# Patient Record
Sex: MALE | Race: WHITE | Employment: FULL TIME | URBAN - METROPOLITAN AREA
[De-identification: names, ages, dates, MRNs, and addresses within clinical notes are randomized per-mention and may not be internally consistent; named-entity substitution may affect disease eponyms.]

---

## 2017-01-11 ENCOUNTER — OFFICE VISIT (OUTPATIENT)
Dept: FAMILY MEDICINE | Facility: CLINIC | Age: 27
End: 2017-01-11

## 2017-01-11 VITALS
DIASTOLIC BLOOD PRESSURE: 84 MMHG | TEMPERATURE: 98.5 F | WEIGHT: 196 LBS | HEART RATE: 61 BPM | SYSTOLIC BLOOD PRESSURE: 124 MMHG | OXYGEN SATURATION: 99 %

## 2017-01-11 DIAGNOSIS — R68.89 FLU-LIKE SYMPTOMS: Primary | ICD-10-CM

## 2017-01-11 DIAGNOSIS — F41.9 ANXIETY: ICD-10-CM

## 2017-01-11 ASSESSMENT — PAIN SCALES - GENERAL: PAINLEVEL: MILD PAIN (2)

## 2017-01-11 NOTE — MR AVS SNAPSHOT
After Visit Summary   1/11/2017    Hal Woo    MRN: [id number]           Patient Information     Date Of Birth          1990        Visit Information        Provider Department      1/11/2017 2:40 PM Brittany Penaloza MD [facility]        Care Instructions    EMDR for anxiety        Follow-ups after your visit        Your next 10 appointments already scheduled     Jan 31, 2017  9:45 AM   (Arrive by 9:30 AM)   New Patient Visit with Ry Loja MD   [facility] ([facility] [place])    [address] [place] [id number] [phone]              Who to contact     Please call your clinic at [phone] to:    Ask questions about your health    Make or cancel appointments    Discuss your medicines    Learn about your test results    Speak to your doctor   If you have compliments or concerns about an experience at your clinic, or if you wish to file a complaint, please contact [facility] Patient Relations at [phone] or email us at [email or web address]         Additional Information About Your Visit        MyChart Information     iPowerUpt gives you secure access to your electronic health record. If you see a primary care provider, you can also send messages to your care team and make appointments. If you have questions, please call your primary care clinic.  If you do not have a primary care provider, please call [phone] and they will assist you.      Beststudy is an electronic gateway that provides easy, online access to your medical records. With Beststudy, you can request a clinic appointment, read your test results, renew a prescription or communicate with your care team.     To access your existing account, please contact your [facility] or call [phone] for assistance.        Care EveryWhere ID     This is your Care EveryWhere ID. This could  be used by other organizations to access your [place] medical records  [id number]        Your Vitals Were     Pulse Temperature Pulse Oximetry             61 98.5  F (36.9  C) (Tympanic) 99%          Blood Pressure from Last 3 Encounters:   01/11/17 124/84    Weight from Last 3 Encounters:   01/11/17 196 lb (88.905 kg)              Today, you had the following     No orders found for display       Primary Care Provider    None       No address on file        Thank you!     Thank you for choosing [facility]  for your care. Our goal is always to provide you with excellent care. Hearing back from our patients is one way we can continue to improve our services. Please take a few minutes to complete the written survey that you may receive in the mail after your visit with us. Thank you!             Your Updated Medication List - Protect others around you: Learn how to safely use, store and throw away your medicines at www.disposemymeds.org.          This list is accurate as of: 1/11/17  3:38 PM.  Always use your most recent med list.                   Brand Name Dispense Instructions for use    MULTIVITAMIN PO      Take by mouth daily       VITAMIN D PO      Take by mouth daily

## 2017-01-11 NOTE — PROGRESS NOTES
Hal Woo is a 26 year old male here for the following issues:    Flu like symptoms  Hal is a new patient to our clinic. He is a generally healthy man but became ill about 5 days ago with sore throat, fatigue, fever, stuffed up head and chest congestion and tightness.  Nonsmoker.  Took Dayquil and Nyquil.     He has missed work this week. He works as a  at the [employer]    He got his Flu vaccine in October 2016.    Anxiety  He has been in therapy for the past 2 yrs. He reports he can't use SSRI, because it worsened his symptoms. He had been on Remeron, 15mg (June till November), but gained weight. . He is exercising, playing [employer] several times a week. His therapist uses CBT and EMDR. He reports drinking 3-4x per week.     [employer]asa res[employer] MBA started fall 2016, 2 yr   at [employer]      There is no problem list on file for this patient.      No current outpatient prescriptions on file.       Allergies not on file     EXAM  /84 mmHg  Pulse 61  Temp(Src) 98.5  F (36.9  C) (Tympanic)  Wt 196 lb (88.905 kg)  SpO2 99%  Gen: Alert, pleasant, NAD  HEENT:  Conjunctiva nl, TM normal bilaterally, OP clear, minor posterior erythema  COR: S1,S2, no murmur  Lungs: CTA bilaterally, no rhonchi, wheezes or rales  Psych: casually groomed, normal speech    Assessment:  (R68.89) Flu-like symptoms  (primary encounter diagnosis)  Comment: suspect viral illness.   Plan: too late to treat for influenza type illness. Recommend symptomatic cares, plenty of fluids, rest, analgesics.    (F41.9) Anxiety  Comment: ongoing treatment with [facility].   Plan: he declines rx for SSRI. He may call if he changes his mind.     Brittany Penaloza MD  Internal Medicine/Pediatrics

## 2017-01-11 NOTE — NURSING NOTE
Chief Complaint   Patient presents with     Sinus Problem     26 year old      Blood pressure 124/84, pulse 61, temperature 98.5  F (36.9  C), temperature source Tympanic, weight 196 lb (88.905 kg), SpO2 99 %. There is no height on file to calculate BMI.    Wt Readings from Last 2 Encounters:   01/11/17 196 lb (88.905 kg)     BP Readings from Last 3 Encounters:   01/11/17 124/84       There is no problem list on file for this patient.      Current Outpatient Prescriptions   Medication Sig Dispense Refill     Multiple Vitamins-Minerals (MULTIVITAMIN PO) Take by mouth daily       Cholecalciferol (VITAMIN D PO) Take by mouth daily         Social History   Substance Use Topics     Smoking status: Never Smoker      Smokeless tobacco: Never Used     Alcohol Use: Not on file         Health Maintenance Due   Topic Date Due     TETANUS IMMUNIZATION (SYSTEM ASSIGNED)  04/03/2007     INFLUENZA VACCINE (SYSTEM ASSIGNED)  09/01/2016       JACINTO TORRES CMA  January 11, 2017 2:58 PM

## 2017-01-18 ENCOUNTER — OFFICE VISIT (OUTPATIENT)
Dept: FAMILY MEDICINE | Facility: CLINIC | Age: 27
End: 2017-01-18

## 2017-01-18 VITALS
OXYGEN SATURATION: 98 % | WEIGHT: 195 LBS | SYSTOLIC BLOOD PRESSURE: 121 MMHG | DIASTOLIC BLOOD PRESSURE: 79 MMHG | TEMPERATURE: 98 F | HEART RATE: 60 BPM

## 2017-01-18 DIAGNOSIS — R07.0 THROAT PAIN: Primary | ICD-10-CM

## 2017-01-18 LAB
BASOPHILS # BLD AUTO: 0 10E9/L (ref 0–0.2)
BASOPHILS NFR BLD AUTO: 0.3 %
DIFFERENTIAL METHOD BLD: NORMAL
EOSINOPHIL # BLD AUTO: 0.2 10E9/L (ref 0–0.7)
EOSINOPHIL NFR BLD AUTO: 3.1 %
ERYTHROCYTE [DISTWIDTH] IN BLOOD BY AUTOMATED COUNT: 12.9 % (ref 10–15)
FLUAV AG UPPER RESP QL IA.RAPID: NEGATIVE
FLUBV AG UPPER RESP QL IA.RAPID: NEGATIVE
HCT VFR BLD AUTO: 43.5 % (ref 40–53)
HGB BLD-MCNC: 15.5 G/DL (ref 13.3–17.7)
IMM GRANULOCYTES # BLD: 0 10E9/L (ref 0–0.4)
IMM GRANULOCYTES NFR BLD: 0.3 %
LYMPHOCYTES # BLD AUTO: 1.6 10E9/L (ref 0.8–5.3)
LYMPHOCYTES NFR BLD AUTO: 21.8 %
MCH RBC QN AUTO: 30.6 PG (ref 26.5–33)
MCHC RBC AUTO-ENTMCNC: 35.6 G/DL (ref 31.5–36.5)
MCV RBC AUTO: 86 FL (ref 78–100)
MONOCYTES # BLD AUTO: 0.7 10E9/L (ref 0–1.3)
MONOCYTES NFR BLD AUTO: 8.7 %
MONONUCLEOSIS SCREEN: NEGATIVE
NEUTROPHILS # BLD AUTO: 4.9 10E9/L (ref 1.6–8.3)
NEUTROPHILS NFR BLD AUTO: 65.8 %
NRBC # BLD AUTO: 0 10*3/UL
NRBC BLD AUTO-RTO: 0 /100
PLATELET # BLD AUTO: 191 10E9/L (ref 150–450)
RBC # BLD AUTO: 5.06 10E12/L (ref 4.4–5.9)
S PYO AG THROAT QL IA.RAPID: NEGATIVE
WBC # BLD AUTO: 7.4 10E9/L (ref 4–11)

## 2017-01-18 ASSESSMENT — PAIN SCALES - GENERAL: PAINLEVEL: NO PAIN (0)

## 2017-01-18 NOTE — NURSING NOTE
Chief Complaint   Patient presents with     Pharyngitis     26 year old      Blood pressure 121/79, pulse 60, temperature 98  F (36.7  C), temperature source Oral, weight 195 lb (88.451 kg), SpO2 98 %. There is no height on file to calculate BMI.    Wt Readings from Last 2 Encounters:   01/18/17 195 lb (88.451 kg)   01/11/17 196 lb (88.905 kg)     BP Readings from Last 3 Encounters:   01/18/17 121/79   01/11/17 124/84       There is no problem list on file for this patient.      Current Outpatient Prescriptions   Medication Sig Dispense Refill     Multiple Vitamins-Minerals (MULTIVITAMIN PO) Take by mouth daily       Cholecalciferol (VITAMIN D PO) Take by mouth daily         Social History   Substance Use Topics     Smoking status: Never Smoker      Smokeless tobacco: Never Used     Alcohol Use: 0.0 oz/week     0 Standard drinks or equivalent per week         There are no preventive care reminders to display for this patient.    JACINTO TORRES CMA  January 18, 2017 10:44 AM

## 2017-01-18 NOTE — MR AVS SNAPSHOT
After Visit Summary   1/18/2017    Hal Woo    MRN: [id number]           Patient Information     Date Of Birth          1990        Visit Information        Provider Department      1/18/2017 10:40 AM Brittany Penaloza MD [facility]        Today's Diagnoses     Throat pain    -  1        Follow-ups after your visit        Your next 10 appointments already scheduled     Jan 31, 2017  9:45 AM   (Arrive by 9:30 AM)   New Patient Visit with Ry Loja MD   [facility] ([facility] [place])    [address] [place] [id number] [phone]              Who to contact     Please call your clinic at [phone] to:    Ask questions about your health    Make or cancel appointments    Discuss your medicines    Learn about your test results    Speak to your doctor   If you have compliments or concerns about an experience at your clinic, or if you wish to file a complaint, please contact [facility] Patient Relations at [phone] or email us at [email or web address]         Additional Information About Your Visit        MyChart Information     Ybraint gives you secure access to your electronic health record. If you see a primary care provider, you can also send messages to your care team and make appointments. If you have questions, please call your primary care clinic.  If you do not have a primary care provider, please call [phone] and they will assist you.      Koinify is an electronic gateway that provides easy, online access to your medical records. With Koinify, you can request a clinic appointment, read your test results, renew a prescription or communicate with your care team.     To access your existing account, please contact your [facility] or call [phone] for assistance.        Care EveryWhere ID     This is your Care EveryWhere ID. This  could be used by other organizations to access your [place] medical records  [id number]        Your Vitals Were     Pulse Temperature Pulse Oximetry             60 98  F (36.7  C) (Oral) 98%          Blood Pressure from Last 3 Encounters:   01/18/17 121/79   01/11/17 124/84    Weight from Last 3 Encounters:   01/18/17 195 lb (88.451 kg)   01/11/17 196 lb (88.905 kg)              We Performed the Following     CBC with platelets differential     Influenza A/B Antigen ([place])     Mononucleosis Screen ([facility])     Rapid Strep Screen (Group) ([facility])     Throat Culture Aerobic Bacterial        Primary Care Provider    None       No address on file        Thank you!     Thank you for choosing [facility]  for your care. Our goal is always to provide you with excellent care. Hearing back from our patients is one way we can continue to improve our services. Please take a few minutes to complete the written survey that you may receive in the mail after your visit with us. Thank you!             Your Updated Medication List - Protect others around you: Learn how to safely use, store and throw away your medicines at www.disposemymeds.org.          This list is accurate as of: 1/18/17 12:26 PM.  Always use your most recent med list.                   Brand Name Dispense Instructions for use    MULTIVITAMIN PO      Take by mouth daily       VITAMIN D PO      Take by mouth daily

## 2017-01-18 NOTE — PROGRESS NOTES
"Hal Woo is a 26 year old male here for the following issues:    Throat pain   Hal was seen 1/11/17 with flu like symptoms. He is generally well but had been ill the 5 days prior with sore throat, fatigue, stuffed up head and a cough. I suspected flu like illness but did not do any testing at that time. Recommended he take time off work and gave symptomatic cares. He played in a [employer] tournament last weekend and felt winded and tired afterward. Two days ago he reports he had recovered and felt 100%. Then he went for a run and felt \"horrible\" afterward.     Yestderday, he had abrupt onset again of sore throat, achy muscles, fatigue and some congestion and headache. Cough is better.  No fever. No nausea or diarrhea     There is no problem list on file for this patient.      Current Outpatient Prescriptions   Medication Sig Dispense Refill     Multiple Vitamins-Minerals (MULTIVITAMIN PO) Take by mouth daily       Cholecalciferol (VITAMIN D PO) Take by mouth daily         Allergies   Allergen Reactions     Seasonal Allergies         EXAM  /79 mmHg  Pulse 60  Temp(Src) 98  F (36.7  C) (Oral)  Wt 195 lb (88.451 kg)  SpO2 98%  Gen: Alert, pleasant, NAD  HEENT:  Conjunctiva nl, TM normal bilaterally, OP clear, mild posterior erythema  Neck with shoddy LAD  COR: S1,S2, no murmur  Lungs: CTA bilaterally, no rhonchi, wheezes or rales    Results for orders placed or performed in visit on 01/18/17   Rapid Strep Screen (Group) ([place])   Result Value Ref Range    Rapid Strep A Screen NEGATIVE Negative   Influenza A/B Antigen ([facility])   Result Value Ref Range    Influenza A NEGATIVE Negative    Influenza B NEGATIVE Negative     Strep culture pending.    Assessment:  (R07.0) Throat pain  (primary encounter diagnosis)  Comment: suspect viral illness  Plan: Rapid Strep Screen (Group) ([facility]), Throat         Culture Aerobic Bacterial, Influenza A/B         Antigen ([facility]), CBC with platelets    "      differential, Mononucleosis Screen ([place])      Recommend rest, fluids, analgesics. Contact MD for worsening symptoms.       Brittany Penaloza MD  Internal Medicine/Pediatrics

## 2017-01-20 LAB
BACTERIA SPEC CULT: NORMAL
MICRO REPORT STATUS: NORMAL
SPECIMEN SOURCE: NORMAL

## 2017-01-31 ENCOUNTER — OFFICE VISIT (OUTPATIENT)
Dept: DERMATOLOGY | Facility: CLINIC | Age: 27
End: 2017-01-31

## 2017-01-31 DIAGNOSIS — L20.89 OTHER ATOPIC DERMATITIS: Primary | ICD-10-CM

## 2017-01-31 RX ORDER — CETIRIZINE HYDROCHLORIDE 10 MG/1
10 TABLET ORAL
COMMUNITY
Start: 2015-10-28 | End: 2021-04-22

## 2017-01-31 RX ORDER — ALPRAZOLAM 0.5 MG
0.5 TABLET ORAL
COMMUNITY
Start: 2015-02-02 | End: 2017-03-03 | Stop reason: ALTCHOICE

## 2017-01-31 RX ORDER — TRIAMCINOLONE ACETONIDE 1 MG/G
OINTMENT TOPICAL 2 TIMES DAILY
Qty: 80 G | Refills: 3 | Status: SHIPPED | OUTPATIENT
Start: 2017-01-31 | End: 2021-12-06

## 2017-01-31 ASSESSMENT — PAIN SCALES - GENERAL: PAINLEVEL: NO PAIN (0)

## 2017-01-31 NOTE — PATIENT INSTRUCTIONS
Cerave Itch Relief Moisturizing Cream TWICE DAILY  Cera Ve Healing Ointment (coms mag blue tube) as a barrier during activity  Triamcinolone - to other sites and during flares TWICE PER DAY until resolved  Desonide - to face, underarms, groin TWICE PER DAY as maintenance

## 2017-01-31 NOTE — PROGRESS NOTES
"[facility] Dermatology Note      Dermatology Problem List:  1. Eczema    Encounter Date: Jan 31, 2017    CC:  Chief Complaint   Patient presents with     Derm Problem     Subhash is here today for a Rash that is all over his body. Subhash notes\" I've had the rash for 1-2 years but it has been getting worse\"         History of Present Illness:  Mr. Hal Woo is a 26 year old male who presents in self referral for evaluation of his eczema. He stated that he has had a red, itchy rash on his underarms, lower back and right upper thigh for ~5 years. He endorsed hot showers and exercise as exacerbating factors and cold showers and CeraVe moisturizer as alleviating factors. He has been evaluated by several primary care doctors an dermatologists and was prescribed desonide cream which he stated provides some relief despite his using it once per day or less.     Past Medical History:   There is no problem list on file for this patient.    History reviewed. No pertinent past medical history.  Past Surgical History   Procedure Laterality Date     Mandible surgery       Hc removal adenoids,primary,<13 y/o       Nose surgery       deviated septum     Hernia repair       infancy       Social History:  The patient is a musician. 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    Family History:  There is no family history of skin cancer.    Medications:  Current Outpatient Prescriptions   Medication Sig Dispense Refill     ALPRAZolam (XANAX) 0.5 MG tablet Take 0.5 mg by mouth       cetirizine (ZYRTEC) 10 MG tablet Take 10 mg by mouth       DHA-EPA-VITAMIN E PO Take 5 mLs by mouth       triamcinolone (KENALOG) 0.1 % ointment Apply topically 2 times daily Avoid face, underarms, groin 80 g 3     Multiple Vitamins-Minerals (MULTIVITAMIN PO) Take by mouth daily       Cholecalciferol (VITAMIN D PO) Take by mouth daily       Allergies   Allergen Reactions     Pineapple Difficulty breathing, Hives, Itching, Rash and " Shortness Of Breath     Seasonal Allergies          Review of Systems:  -As per HPI  -Constitutional: The patient denies fatigue, fevers, chills, unintended weight loss, and night sweats.  -HEENT: Patient denies nonhealing oral sores.  -Skin: As above in HPI. No additional skin concerns.    Physical exam:  Vitals: There were no vitals taken for this visit.  GEN: This is a well developed, well-nourished male in no acute distress, in a pleasant mood.    SKIN: Waist-up skin, which includes the head/face, neck, both arms, chest, back, abdomen, digits and/or nails was examined.  -well circumscribed erythematous plaques and fine papules with moderate lichenification over bilateral axilla, lower back and right inner thigh  -No other lesions of concern on areas examined.     Impression/Plan:  1. Eczematous dermatitis    CeraVe anti-itch moisturizer BID and PRN    Continue Desonide (prescribed by outside doctor)      Triamcinolone 0.1% for flares (BID until resolved)      Follow-up in 3 months, earlier for new or changing lesions.     Staff Involved:  Scribed by Myrtle Kuo MS4 for Dr. Loja.      Staff attestation:  The documentation recorded by the scribe accurately reflects the services I personally performed and the decisions I personally made.    Ry Loja MD  Staff Dermatologist    Department of Dermatology

## 2017-01-31 NOTE — Clinical Note
"1/31/2017       RE: Hal Woo  [address]  [place] [address]     Dear Colleague,    Thank you for referring your patient, Hal Woo, to the [facility] DERMATOLOGY at [facility]. Please see a copy of my visit note below.    [facility] Dermatology Note      Dermatology Problem List:  1. Eczema    Encounter Date: Jan 31, 2017    CC:  Chief Complaint   Patient presents with     Derm Problem     Subhash is here today for a Rash that is all over his body. Subhash notes\" I've had the rash for 1-2 years but it has been getting worse\"         History of Present Illness:  Mr. Hal Woo is a 26 year old male who presents in self referral for evaluation of his eczema. He stated that he has had a red, itchy rash on his underarms, lower back and right upper thigh for ~5 years. He endorsed hot showers and exercise as exacerbating factors and cold showers and CeraVe moisturizer as alleviating factors. He has been evaluated by several primary care doctors an dermatologists and was prescribed desonide cream which he stated provides some relief despite his using it once per day or less.     Past Medical History:   There is no problem list on file for this patient.    History reviewed. No pertinent past medical history.  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    Family History:  There is no family history of skin cancer.    Medications:  Current Outpatient Prescriptions   Medication Sig Dispense Refill     ALPRAZolam (XANAX) 0.5 MG tablet Take 0.5 mg by mouth       cetirizine (ZYRTEC) 10 MG tablet Take 10 mg by mouth       DHA-EPA-VITAMIN E PO Take 5 mLs by mouth       triamcinolone (KENALOG) 0.1 % ointment Apply " topically 2 times daily Avoid face, underarms, groin 80 g 3     Multiple Vitamins-Minerals (MULTIVITAMIN PO) Take by mouth daily       Cholecalciferol (VITAMIN D PO) Take by mouth daily       Allergies   Allergen Reactions     Pineapple Difficulty breathing, Hives, Itching, Rash and Shortness Of Breath     Seasonal Allergies          Review of Systems:  -As per HPI  -Constitutional: The patient denies fatigue, fevers, chills, unintended weight loss, and night sweats.  -HEENT: Patient denies nonhealing oral sores.  -Skin: As above in HPI. No additional skin concerns.    Physical exam:  Vitals: There were no vitals taken for this visit.  GEN: This is a well developed, well-nourished male in no acute distress, in a pleasant mood.    SKIN: Waist-up skin, which includes the head/face, neck, both arms, chest, back, abdomen, digits and/or nails was examined.  -well circumscribed erythematous plaques and fine papules with moderate lichenification over bilateral axilla, lower back and right inner thigh  -No other lesions of concern on areas examined.     Impression/Plan:  1. Eczematous dermatitis    CeraVe anti-itch moisturizer BID and PRN    Continue Desonide (prescribed by outside doctor)      Triamcinolone 0.1% for flares (BID until resolved)      Follow-up in 3 months, earlier for new or changing lesions.     Staff Involved:  Scribed by Myrtle Kuo, MS4 for Dr. Loja.        Again, thank you for allowing me to participate in the care of your patient.      Sincerely,    Ry Loja MD

## 2017-01-31 NOTE — MR AVS SNAPSHOT
After Visit Summary   1/31/2017    Hal Woo    MRN: [id number]           Patient Information     Date Of Birth          1990        Visit Information        Provider Department      1/31/2017 9:45 AM Ry Loja MD [facility] D[facility]        Today's Diagnoses     Other atopic dermatitis    -  1       Care Instructions    Cerave Itch Relief Moisturizing Cream TWICE DAILY  Cera Ve Healing Ointment (coms mag blue tube) as a barrier during activity  Triamcinolone - to other sites and during flares TWICE PER DAY until resolved  Desonide - to face, underarms, groin TWICE PER DAY as maintenance          Follow-ups after your visit        Your next 10 appointments already scheduled     Apr 28, 2017  9:30 AM   (Arrive by 9:15 AM)   Return Visit with Ry Loja MD   [facility] ([facility])    [address] [place] [id number] [phone]              Who to contact     Please call your clinic at [phone] to:    Ask questions about your health    Make or cancel appointments    Discuss your medicines    Learn about your test results    Speak to your doctor   If you have compliments or concerns about an experience at your clinic, or if you wish to file a complaint, please contact [facility] Phys[facility] Patient Relations at [phone] or email us at [email or web address]         Additional Information About Your Visit        MyChart Information     [employer] gives you secure access to your electronic health record. If you see a primary care provider, you can also send messages to your care team and make appointments. If you have questions, please call your primary care clinic.  If you do not have a primary care provider, please call [phone] and they will assist you.      [employer]Equ[employer] is an electronic gateway that provides easy, online access to your medical records. With [employer], you can request  a clinic appointment, read your test results, renew a prescription or communicate with your care team.     To access your existing account, please contact your [facility] or call [phone] for assistance.        Care EveryWhere ID     This is your Care EveryWhere ID. This could be used by other organizations to access your [place] medical records  [id number]         Blood Pressure from Last 3 Encounters:   01/18/17 121/79   01/11/17 124/84    Weight from Last 3 Encounters:   01/18/17 88.451 kg (195 lb)   01/11/17 88.905 kg (196 lb)              Today, you had the following     No orders found for display         Today's Medication Changes          These changes are accurate as of: 1/31/17 10:45 AM.  If you have any questions, ask your nurse or doctor.               Start taking these medicines.        Dose/Directions    triamcinolone 0.1 % ointment   Commonly known as:  KENALOG   Used for:  Other atopic dermatitis   Started by:  Ry Loja MD        Apply topically 2 times daily Avoid face, underarms, groin   Quantity:  80 g   Refills:  3            Where to get your medicines      These medications were sent to [facility] #[id number] - [address] [address]     Phone:  [phone]    - triamcinolone 0.1 % ointment             Primary Care Provider    None       No address on file        Thank you!     Thank you for choosing [facility] DERMATOLOGY  for your care. Our goal is always to provide you with excellent care. Hearing back from our patients is one way we can continue to improve our services. Please take a few minutes to complete the written survey that you may receive in the mail after your visit with us. Thank you!             Your Updated Medication List - Protect others around you: Learn how to safely use, store and throw away your medicines at www.disposemymeds.org.          This list is  accurate as of: 1/31/17 10:45 AM.  Always use your most recent med list.                   Brand Name Dispense Instructions for use    ALPRAZolam 0.5 MG tablet    XANAX     Take 0.5 mg by mouth       cetirizine 10 MG tablet    zyrTEC     Take 10 mg by mouth       DHA-EPA-VITAMIN E PO      Take 5 mLs by mouth       MULTIVITAMIN PO      Take by mouth daily       triamcinolone 0.1 % ointment    KENALOG    80 g    Apply topically 2 times daily Avoid face, underarms, groin       VITAMIN D PO      Take by mouth daily

## 2017-01-31 NOTE — NURSING NOTE
"Dermatology Rooming Note    Hal Woo's goals for this visit include:   Chief Complaint   Patient presents with     Derm Problem     Subhahs is here today for a Rash that is all over his body. Subhash notes\" I've had the rash for 1-2 years but it has been getting worse\"     Dannielle Tello MA    "

## 2017-02-08 DIAGNOSIS — F41.9 ANXIETY: Primary | ICD-10-CM

## 2017-02-13 ENCOUNTER — OFFICE VISIT (OUTPATIENT)
Dept: PSYCHIATRY | Facility: CLINIC | Age: 27
End: 2017-02-13
Attending: PSYCHIATRY & NEUROLOGY
Payer: COMMERCIAL

## 2017-02-13 VITALS — DIASTOLIC BLOOD PRESSURE: 76 MMHG | WEIGHT: 196.8 LBS | HEART RATE: 63 BPM | SYSTOLIC BLOOD PRESSURE: 119 MMHG

## 2017-02-13 DIAGNOSIS — F41.9 ANXIETY: ICD-10-CM

## 2017-02-13 PROCEDURE — 99212 OFFICE O/P EST SF 10 MIN: CPT | Mod: ZF

## 2017-02-13 RX ORDER — GABAPENTIN 100 MG/1
CAPSULE ORAL
Qty: 45 CAPSULE | Refills: 1 | Status: SHIPPED | OUTPATIENT
Start: 2017-02-13 | End: 2017-02-17 | Stop reason: SINTOL

## 2017-02-13 NOTE — MR AVS SNAPSHOT
After Visit Summary   2/13/2017    Hal Woo    MRN: [id number]           Patient Information     Date Of Birth          1990        Visit Information        Provider Department      2/13/2017 1:00 PM She Rivera MD Psychiatry Clinic        Today's Diagnoses     Anxiety           Follow-ups after your visit        Your next 10 appointments already scheduled     Mar 14, 2017 12:00 PM CDT   Adult Med Follow UP with She Rivera MD   Psychiatry Clinic ([facility])    [address]  [address] [facility] [id number] [phone]            Apr 28, 2017  9:30 AM CDT   (Arrive by 9:15 AM)   Return Visit with Ry Loja MD   [facility] ([facility])    [address] [place] [id number] [phone]              Who to contact     Please call your clinic at [phone] to:    Ask questions about your health    Make or cancel appointments    Discuss your medicines    Learn about your test results    Speak to your doctor   If you have compliments or concerns about an experience at your clinic, or if you wish to file a complaint, please contact [facility] Phys[facility] Patient Relations at [phone] or email us at [email or web address]         Additional Information About Your Visit        MyChart Information     [employer] gives you secure access to your electronic health record. If you see a primary care provider, you can also send messages to your care team and make appointments. If you have questions, please call your primary care clinic.  If you do not have a primary care provider, please call [phone] and they will assist you.      [employer] is an electronic gateway that provides easy, online access to your medical records. With [employer], you can request a clinic appointment, read your test results, renew a prescription or communicate with your  care team.     To access your existing account, please contact your [facility] or call [phone] for assistance.        Care EveryWhere ID     This is your Care EveryWhere ID. This could be used by other organizations to access your [place] medical records  [id number]        Your Vitals Were     Pulse                   63            Blood Pressure from Last 3 Encounters:   02/13/17 119/76   01/18/17 121/79   01/11/17 124/84    Weight from Last 3 Encounters:   02/13/17 89.3 kg (196 lb 12.8 oz)   01/18/17 88.5 kg (195 lb)   01/11/17 88.9 kg (196 lb)              Today, you had the following     No orders found for display         Today's Medication Changes          These changes are accurate as of: 2/13/17  2:44 PM.  If you have any questions, ask your nurse or doctor.               Start taking these medicines.        Dose/Directions    gabapentin 100 MG capsule   Commonly known as:  NEURONTIN   Used for:  Anxiety        Take one cap at night for four days then increase by one cap every 4 days to a total of 300mg a day   Quantity:  45 capsule   Refills:  1            Where to get your medicines      [id number] - [address] [address]     Phone:  [phone]     gabapentin 100 MG capsule                Primary Care Provider    Physician No Ref-Primary       No address on file        Thank you!     Thank you for choosing PSYCHIATRY CLINIC  for your care. Our goal is always to provide you with excellent care. Hearing back from our patients is one way we can continue to improve our services. Please take a few minutes to complete the written survey that you may receive in the mail after your visit with us. Thank you!             Your Updated Medication List - Protect others around you: Learn how to safely use, store and throw away your medicines at www.disposemymeds.org.           This list is accurate as of: 2/13/17  2:44 PM.  Always use your most recent med list.                   Brand Name Dispense Instructions for use    ALPRAZolam 0.5 MG tablet    XANAX     Take 0.5 mg by mouth       cetirizine 10 MG tablet    zyrTEC     Take 10 mg by mouth       DHA-EPA-VITAMIN E PO      Take 5 mLs by mouth       gabapentin 100 MG capsule    NEURONTIN    45 capsule    Take one cap at night for four days then increase by one cap every 4 days to a total of 300mg a day       MULTIVITAMIN PO      Take by mouth daily       triamcinolone 0.1 % ointment    KENALOG    80 g    Apply topically 2 times daily Avoid face, underarms, groin       VITAMIN D PO      Take by mouth daily

## 2017-02-13 NOTE — PROGRESS NOTES
"PSYCHIATRIC  DIAGNOSTIC  EVALUATION            90 minute evaluation    IDENTIFICATION   Hal Woo is a 26 year old male who was referred by PCP for evaluation of anxiety.  History was provided by patient who was a good historian.       CHIEF COMPLAINT         \"I'm incredibly stressed and anxious all the time. \"      HISTORY OF PRESENT ILLNESS     PSYCH CRITICAL ITEM HISTORY includes suicidal ideation and CD: EtOH, THC, acid x1.  Mental health issues were first experienced in childhood and he first received mental health care through therapist in grade school.       PERTINENT BACKGROUND:  Pt notes mood symptoms that started in childhood. He saw a therapist at a young age (K-5th grade) for depression. A low level of anxiety has been present as well, first noticed an increase in anxiety around May 2011 when he graduated from college and moved in with his parents for 2 months in the fall of that year. October 2011 when he experienced his first \"panic attack\", while at work, in the context of overuse of caffeine to help aid a hangover from a previous night of drinking EtOH. Since 2011 he discusses having a high amount of \"fear and worry\" which is prominent when leaving his home and exacerbated by feeling like he is trapped and does not have an escape route.    MOST RECENT HISTORY began in September 2014 when he notes a substantial increase when he started to live alone due to his roommates going separate ways (moving in with partners, etc) and he had his 2nd \"bad panic attack\" where he went to the ER, was given Xanax and Valium and referred to see psychiatry. He followed up with only 1 appointment. In November 2014 he started to see a therapist for CBT which he did not connect with or find helpful. Also in 2014 he started his first SSRI trial with citalopram being prescribed by his PCP, he noted an increase in anxiety with this medication, dose unknown, and states it made him feel like his \"brain was on fire\". In " 2015 he had a trial of Buspar which made him feel tired so he stopped use fairly quickly. In 2016 he was prescribed escitalopram from PCP with same side effects experienced to such a severe degree he almost called 911, he didn't leave his apartment for 2 weeks due to heightened anxiety. Following this, he sought care from a psychiatrist who prescribed venlafaxine as this medication has been helpful for pts father who also experiences anxiety. Pt reports same side effect from 1 dose of venlafaxine as his SSRI trials. November of 2016 he started to see a psychiatrist [place] [facility] [place] he has continued to see who started him on mirtazapine, which he tolerated and felt was helpful but he stopped it around Thanksgiving secondary to weight gain. In the past 3 months he has continued to experience increase in stressors including being in graduate school and the results of the recent presidential election. He has been using lose dose Xanax (0.25-0.5mg) when overwhelmed by anxiety (about 1x per month) and saw the psychiatrist at [facility] last week who was recommending retrial of mirtazapine, start hydroxyzine with plans to consider gabapentin in the future. Patient continues to participate in therapy, which he describes at [facility]/Mindfulness therapy that he finds helpful.     SUBSTANCE USE:     ALCOHOL-  2-3 drink(s) per week,   TOBACCO- none        CAFFEINE- limits use now 2/2 anxiety        OPIOIDS- none / NARCAN KIT-  No             CANNABIS- none             OTHER ILLICIT DRUGS- none    Financial Support- working.  Living Situation- lives alone.     Children- none.                             Feels Safe at Home- Yes.    MEDICAL ROS:  Reports none, aside from chronic chest tightness he attributes to anxiety. Denies any physical health symptoms of concern.    RECENT SYMPTOMS   [PSYCH ROS]     PANIC ATTACK:  peaks in < 15-30 mins, occurs 1-2x per month, triggers are known [escape route cut off, leaving apartment], SOB and chest  "tightness along with feeling flushed, tremors and a sense of detachment with fear/dread; denies experiencing any GI sx, sweats, bryn-oral or extremity numbness, dizziness, change in smells  ANXIETY:  excessive worry, fears going out of home [is able to leave home but avoids many activities], social anxiety, nervous/tense and overwhelmed  DEPRESSION:  depressed mood, insomnia , low energy and suicidal ideation [fleeting for past 2 years];  DENIES- anhedonia, feeling worthless and excessive crying  DYSREGULATION:  none;  DENIES- aggression, angry outbursts, violent behavior and SIB  PSYCHOSIS:  none;  DENIES- hallucinations and delusions  DEVAUGHN/HYPOMANIA:  none;  DENIES- elevated mood, increased goal-directed activity, increased energy and decreased need for sleep  TRAUMA RELATED:  none  EATING DISORDER:  Not discussed  COMPULSIVE:  none  OTHER:  none     PSYCHIATRIC HISTORY     SIB [method, most recent]- none  Suicidal Ideation Hx [passive, active]- fleeting, passive SI present for past 2 years  Suicide Attempt [#, recent, method]:   #- N/A   Most Recent- N/A    Violence/Aggression Hx- none  Psychosis Hx- none  Psych Hosp [ #, most recent, committed]- none  ECT [#, most recent]- none    Eating Disorder: No history    Outpatient Programs [ DBT, Day Treatment, Eating Disorder Tx etc] : None    PAST MEDICATION TRIALS    Celexa (citalopram), Lexapro (escitalopram) and Effexor (venlafaxine)- Pt had trials of only a few days or even 1 dose (venlafaxine) and reports increased anxiety and \"Feeling like brain is on fire\".   Mirtazapine- found helpful but discontinued 2/2 weight gain   Xanax (alprazolam) and Buspar (buspirone)- Xanax has been helpful, buspar was too sedating with no relief of anxiety symptoms    SUBSTANCE USE HISTORY                                                                 Past Use- EtOH 2-3 drink(s) per week,, heavy use during ages [age], drank daily at heaviest, to point of blacking out, no legal " consequences or impact on fucntioning; THC- used daily from ages [age]; Acid0 1x use, states he feels guilty about this  Treatment [#, most recent] - none  Medical Consequences [withdrawal, sz etc] - none  HIV/Hepatitis- none  Legal Consequences- none    SOCIAL HISTORY                                                                      patient reported   Financial Support- documented above  Living Situation/Family/Relationships- documented above  Trauma History (self-report)- None  Legal- None  Social/Spiritual Support- talks with mother and has 1 younger brother, also reports good support from friends  Early History/Education- completed college, currently in [employer] program    FAMILY HISTORY                                                                       patient reported     Family Mental Health History-  MDD- mother, notes sensitivity to medications, had similar rxns as pt to SSRIs  Anxiety- Father (venlafaxine helpful) and brother  No hx of BPAD or schizophrenia in family  No hx of completed suicides    MEDICAL / SURGICAL HISTORY                                   CARE TEAM:          PCP- Brittany Penaloza MD                 Therapist- Sofía Ramirez MSW MPH LICSW    No Medical Problem List    ALLERGY                                Pineapple and Seasonal allergies  MEDICATIONS                               Current Outpatient Prescriptions   Medication Sig Dispense Refill     gabapentin (NEURONTIN) 100 MG capsule Take one cap at night for four days then increase by one cap every 4 days to a total of 300mg a day 45 capsule 1     ALPRAZolam (XANAX) 0.5 MG tablet Take 0.5 mg by mouth       cetirizine (ZYRTEC) 10 MG tablet Take 10 mg by mouth       DHA-EPA-VITAMIN E PO Take 5 mLs by mouth       triamcinolone (KENALOG) 0.1 % ointment Apply topically 2 times daily Avoid face, underarms, groin 80 g 3     Multiple Vitamins-Minerals (MULTIVITAMIN PO) Take by mouth daily       Cholecalciferol (VITAMIN D PO) Take by mouth  daily         VITALS   /76 (BP Location: Right leg, Patient Position: Chair, Cuff Size: Adult Large)  Pulse 63  Wt 89.3 kg (196 lb 12.8 oz)   MENTAL STATUS EXAM                                                             Alertness: alert  and oriented  Appearance: adequately groomed and appearance was notable for long hair, facial hair and wearing hat and bright multi-colored clothing  Behavior/Demeanor: cooperative and pleasant, with fair  eye contact  Speech: regular rate and rhythm  Language: intact  Psychomotor: fidgety  Mood:  anxious  Affect: full range; was congruent to mood; was congruent to content  Thought Process/Associations: unremarkable  Thought Content:  transient (intermittent), passive suicidal ideation with no plan or intent  Perception:  Denies hallucinations  Insight: adequate  Judgment: adequate for safety  Cognition:  does not appear grossly intact; formal cognitive testing was not done    LABS and DATA   No lab results available as of FEB 2017    AIMS:  N/A      PHQ9 TODAY = None and no flowsheet      PSYCHIATRIC DIAGNOSES                                                                                                    WILLIS with agoraphobia  R/o MDD     ASSESSMENT                                           See 'Plan' section for specific dosing info             This patient is a 26 year old male who provides a history supporting the diagnoses listed directly above.  Further diagnostic clarification is needed in regards to depression symptoms and history.  There are no medical comorbidities which impact this treatment.    DISCUSSION: The patient presents with a long standing history of mood symptoms and most bothersome being a constant heightened level of anxiety, especially when leaving his home which has impacted functioning severely. He has been dx with WILLIS and panic disorder, however pts report symptoms of panic are not consistent with panic attacks as he states it can take over 15-30  minutes for symptoms to peak and this is likely just exacerbation of his high level of anxiety. Given patients high level of constant anxiety he may be the rare candidate who would benefit from a short trial of scheduled benzodiazepines to decrease anxiety level enough where patient may tolerate trial of other psychotropics better (SSRI/SNRI). Could consider other classes of medications including MAOI, retrial of mirtazapine with added medication to decrease weight gain (topiramate) or trial of newer SSRI's including Viibryd or Trintellix. Given patients significant side effects, he may also benefit from [employer] testing to assess his enzymatic activity. Given pts previous psychiatrist had recommended trial of gabapentin this appears most reasonable first step, starting at low dose and increasing as tolerated.     SUICIDE RISK ASSESSMENT:  Today Hal Woo reports no active SI, he has passive fleeting SI. In addition, he has notable risk factors for self-harm, including anxiety, insomnia and singe status. However, risk is mitigated by no h/o suicide attempt, no h/o SIB, no h/o risky impulsive behavior, commitment to family, stable job, stable housing, h/o seeking help when needed and future oriented. Therefore, based on all available evidence including the factors cited above, he does not appear to be at imminent risk for self-harm, does not meet criteria for a 72-hr hold, and therefore involuntary hospitalization will not be pursued at this  time. However, based on degree of symptoms, voluntary referral for anxiety/insomnia mngmt and med changes was recommended. he accepted this offer.  Additional steps to minimize risk: anxiety/insomnia mngmt and med changes    PSYCHOTROPIC DRUG INTERACTIONS:   None including the addition of gabapentin  MANAGEMENT:  N/A     PLAN                                                                                                       1) MEDICATION:      - continue current  medications      - Start Gabapentin 100mg PO 1 tab at bedtime for 4 days, then increase by 1 tab every 4 days until total dose of 300mg reached.    2) THERAPY:  Continue    3) LABS:  Will consider getting routine labs, including TSH at next visit.                   Pt also given information regarding genesight testing to investigate insurance coverage and may pursue next visit.      RATING SCALES:  none    4) REFERRALS [CD, medical, other]:  none    5) :  none    6) RTC: 4 weeks    7) CRISIS NUMBERS: Provided routinely in AVS   ONLY if a JOSTIN PT: [facility] [phone] (clinic), [phone] (after hours)     TREATMENT RISK STATEMENT:  The risks, benefits, alternatives and potential adverse effects have been explained and are understood by the pt. The pt agrees to the treatment plan with the ability to do so. The pt knows to call the clinic for any problems or to access emergency care if needed.  Medical and CD concerns are documented above.  Psychotropic drug interaction check was done, including changes made today, and is discussed above.     WHODAS 2.0  TODAY total score = N/A; [a 12-item WHODAS 2.0 assessment was not completed by the pt today and/or recorded in EPIC].    RESIDENT:   She Rivera DO  PGY-2 Psychiatry Resident    Patient was evaluated in clinic with Dr. Brown who will sign the note.    Supervisor is Dr. Brown.  I saw the patient with the resident, and participated in key portions of the service, including the mental status examination and developing the plan of care. I reviewed key portions of the history with the resident. I agree with the findings and plan as documented in this note.    Rody Brown

## 2017-02-17 ENCOUNTER — CARE COORDINATION (OUTPATIENT)
Dept: PSYCHIATRY | Facility: CLINIC | Age: 27
End: 2017-02-17

## 2017-02-17 DIAGNOSIS — F41.1 GAD (GENERALIZED ANXIETY DISORDER): Primary | ICD-10-CM

## 2017-02-17 RX ORDER — HYDROXYZINE HYDROCHLORIDE 25 MG/1
TABLET, FILM COATED ORAL
Qty: 45 TABLET | Refills: 0 | Status: SHIPPED
Start: 2017-02-17 | End: 2017-03-03 | Stop reason: ALTCHOICE

## 2017-02-17 NOTE — PROGRESS NOTES
Last seen: 2/13/17  Next appt: 3/14/17    Returned call to pt.  Started gabapentin 100 mg qhs on Monday night and has continued on this dose.  Calls with the following concerns since starting medication:     1.  Overall anxiety has worsened  2.  Waking up at night feeling pressure/strain in his head which is similar to a HA and he has experienced in the past with SSRI's  3.  Feels very hyper, yet drowsy, which is worse in the morning    Pt denies having started or stopped any other medications since Monday.  Also denies that an medication doses have changed.  Will notify Dr. Rivera and seek recommendations.

## 2017-02-17 NOTE — PROGRESS NOTES
Returned call to patient. Instructed to stop gabapentin. Discussed risks, benefits, and alternatives to starting trial of low dose hydroxyzine to target anxiety and increased ruminations and sleep. He had filled a prescription previously of 10mg tablets but has not taken them. Discussed dosing options for the evening to best target anxiety peaks and decrease morning sedation, with plan to add daytime dosing for full day coverage, however best to see how pt tolerates medication first given his history of sensitivity and side effects. Pt also shared he has contacted his insurance company re: [employer] testing and noted it would not be covered but he has an appeal in process and should hear back in 30 days. Pt agreeable to plan as follows:     -Stop gabapentin  -Start Hydroxyzine 25mg take 0.5 tablet ~6pm and 1 tablet one hour before sleep.     Pt will plan to check-in and alert writer and supervisor of any noted side effects of medication. He denied any safety concerns was was instructed to come into ED for evaluation should this change.     Pt and plan discussed with attending physician Dr. Brown.     She Rivera, DO  PGY-2 Psychiatry Resident

## 2017-02-17 NOTE — PROGRESS NOTES
" Med SE/Miguel  Received: Today       Roxanne Ferreira Katherine J, RN       Phone Number: [phone] (Call me)                     The pt is caller. States the Gabapentin he started on 2/13 \"is making me crazy\". He'll be available all day today for a call.         "

## 2017-02-21 ENCOUNTER — CARE COORDINATION (OUTPATIENT)
Dept: PSYCHIATRY | Facility: CLINIC | Age: 27
End: 2017-02-21

## 2017-02-21 NOTE — PROGRESS NOTES
Message  Received: Today       Cari Aguirre Katherine J, RN       Phone Number: [phone]                     Miguel/Davida     Patient is caller. Says that  had wanted him to call and give an update on the medication.

## 2017-02-21 NOTE — PROGRESS NOTES
"Returned call to pt.  Friday night tried hydroxyzine 12.5 mg with dinner and 25 mg one hour before HS.  States that medication \"knocked me out\" and felt quite drowsy until Saturday afternoon.  Since then has only been taking hydroxyzine 12.5 mg at bedtime.  Is able to sleep without feeling drowsy in the morning and is able to get up easily in the morning.  Describes that sleep is not restful but is  not bad either.  Continues to feel anxious during the day.  Does worry that if he takes hydroxyzine during the day that it may make him drowsy but is be willing to try on the w/e if recommended.  Will notify Dr. Rivera and supervising MD of call.   "

## 2017-02-22 NOTE — PROGRESS NOTES
Rody Brown MD Hartley, Alexandra, MD; Gena Joe RN        Caller: Unspecified (Yesterday, 11:51 AM)                     I will handle this on WED or THURS.  I will have an answer by THURS afternoon at the latest.

## 2017-02-22 NOTE — PROGRESS NOTES
Message  Received: Today       Cari Aguirre Katherine J, RN       Phone Number: [phone]                     Miguel/Davida     Patient is caller. He says that he talked to the nurse, but was told the dr would also call him. He has not heard from her and is wondering if she could call. Ok to leave a detailed message

## 2017-02-24 ENCOUNTER — TELEPHONE (OUTPATIENT)
Dept: PSYCHIATRY | Facility: CLINIC | Age: 27
End: 2017-02-24

## 2017-02-24 DIAGNOSIS — F41.1 GAD (GENERALIZED ANXIETY DISORDER): Primary | ICD-10-CM

## 2017-02-24 NOTE — PROGRESS NOTES
Patient Call  Received: Today       Nataly Hernandez Katherine J, RN       Phone Number: [phone]                     Hal Leary had called earlier this week and was expecting Dr. Rivera or a nurse to call back since Wednesday.  He is hoping someone can call him today at [phone].  It is ok to leave a detailed message.     Thanks,   Nataly

## 2017-02-24 NOTE — TELEPHONE ENCOUNTER
"Writer returned phone call to pt regarding hydroxyzine dosage. He reported that with his first trial of 12.5mg Q6PM and 25mg PO QHS he felt overly sedated and \"knocked out\" and therefore just started to take only 12.5mg QHS. He noted that over the past several days he had worsening depression which he attributes to also being more tired/sedated, and yesterday had suicidal thoughts so he stopped the medication last night and has been feeling better today and denies current SI. He is agreeable to trial of 5mg PO QHS tonight, he has a supply of 10mg tablets already filled from previous prescription which he can cut in half. He was instructed to increase to BID dosing of 5mg should he tolerate the initial QHS dose. If his depression continues to worsen or he has return of suicidal ideation he was instructed to discontinue use of medication. He was also offered earlier appointment and will be scheduled for Tuesday, 2/28/17 at 2:00pm as other medication options should be discussed in face to face appointment instead of over the phone.    Pt was discussed with supervising physician Dr. Brown.     She Rivera, DO  PGY-2 Psychiatry Resident  "

## 2017-02-28 ENCOUNTER — OFFICE VISIT (OUTPATIENT)
Dept: PSYCHIATRY | Facility: CLINIC | Age: 27
End: 2017-02-28
Attending: PSYCHIATRY & NEUROLOGY
Payer: COMMERCIAL

## 2017-02-28 VITALS — HEART RATE: 72 BPM | SYSTOLIC BLOOD PRESSURE: 121 MMHG | DIASTOLIC BLOOD PRESSURE: 78 MMHG | WEIGHT: 195 LBS

## 2017-02-28 DIAGNOSIS — F41.1 GAD (GENERALIZED ANXIETY DISORDER): Primary | ICD-10-CM

## 2017-02-28 PROCEDURE — 99212 OFFICE O/P EST SF 10 MIN: CPT | Mod: ZF

## 2017-02-28 RX ORDER — LORAZEPAM 0.5 MG/1
TABLET ORAL
Qty: 21 TABLET | Refills: 0 | Status: SHIPPED | OUTPATIENT
Start: 2017-02-28 | End: 2017-03-10

## 2017-02-28 NOTE — NURSING NOTE
Chief Complaint   Patient presents with     Recheck Medication     Anxiety    Reviewed allergies, smoking status, and pharmacy preference  Administered abuse screening questions   Obtained weight, blood pressure and heart rate

## 2017-02-28 NOTE — PATIENT INSTRUCTIONS
Lorazepam (At tobias):  0.5mg tabs--  A) Start with 1 tab qAM and 1 tab qHS  (after 3:00)  B) Then increase by 1 tab every 5 days (if tolerating and needed) to a max of 2 tabs twice for a total of 2mg.

## 2017-02-28 NOTE — MR AVS SNAPSHOT
After Visit Summary   2/28/2017    Hal Woo    MRN: [id number]           Patient Information     Date Of Birth          1990        Visit Information        Provider Department      2/28/2017 2:00 PM She Rivera MD Psychiatry Clinic        Today's Diagnoses     WILLIS (generalized anxiety disorder)    -  1      Care Instructions    Lorazepam (At tobias):  0.5mg tabs--  A) Start with 1 tab qAM and 1 tab qHS  (after 3:00)  B) Then increase by 1 tab every 5 days (if tolerating and needed) to a max of 2 tabs twice for a total of 2mg.        Follow-ups after your visit        Your next 10 appointments already scheduled     Mar 10, 2017 10:10 AM CST   Adult Med Follow UP with Agnes Shepherd MD   Psychiatry Clinic ([facility])    [address]  [address] [facility] [id number] [phone]            Apr 28, 2017  9:30 AM CDT   (Arrive by 9:15 AM)   Return Visit with Ry Loja MD   [facility] ([facility])    [address] [place] [id number] [phone]              Who to contact     Please call your clinic at [phone] to:    Ask questions about your health    Make or cancel appointments    Discuss your medicines    Learn about your test results    Speak to your doctor   If you have compliments or concerns about an experience at your clinic, or if you wish to file a complaint, please contact [facility] Patient Relations at [phone] or email us at [email or web address]         Additional Information About Your Visit        MyChart Information     [facility] gives you secure access to your electronic health record. If you see a primary care provider, you can also send messages to your care team and make appointments. If you have questions, please call your primary care clinic.  If you do not have a primary care provider, please  call [phone] and they will assist you.      [employer] is an electronic gateway that provides easy, online access to your medical records. With [employer], you can request a clinic appointment, read your test results, renew a prescription or communicate with your care team.     To access your existing account, please contact your [facility] or call [phone] for assistance.        Care EveryWhere ID     This is your Care EveryWhere ID. This could be used by other organizations to access your [place] medical records  [id number]        Your Vitals Were     Pulse                   72            Blood Pressure from Last 3 Encounters:   02/28/17 121/78   02/13/17 119/76   01/18/17 121/79    Weight from Last 3 Encounters:   02/28/17 88.5 kg (195 lb)   02/13/17 89.3 kg (196 lb 12.8 oz)   01/18/17 88.5 kg (195 lb)              Today, you had the following     No orders found for display         Today's Medication Changes          These changes are accurate as of: 2/28/17  3:18 PM.  If you have any questions, ask your nurse or doctor.               Start taking these medicines.        Dose/Directions    LORazepam 0.5 MG tablet   Commonly known as:  ATIVAN   Used for:  WILLIS (generalized anxiety disorder)   Started by:  She Rivera MD        Take one tab BID (second dose after 3:00) x 5 days then increase to 3 tabs a day x 5 days then increase to 4 tabs a day if needed and tolerating   Quantity:  21 tablet   Refills:  0            Where to get your medicines      Some of these will need a paper prescription and others can be bought over the counter.  Ask your nurse if you have questions.     Bring a paper prescription for each of these medications     LORazepam 0.5 MG tablet                Primary Care Provider Office Phone # Fax #    Brittany Penaloza [phone]       [address] [address]        Thank you!     Thank you for  choosing PSYCHIATRY CLINIC  for your care. Our goal is always to provide you with excellent care. Hearing back from our patients is one way we can continue to improve our services. Please take a few minutes to complete the written survey that you may receive in the mail after your visit with us. Thank you!             Your Updated Medication List - Protect others around you: Learn how to safely use, store and throw away your medicines at www.disposemymeds.org.          This list is accurate as of: 2/28/17  3:18 PM.  Always use your most recent med list.                   Brand Name Dispense Instructions for use    ALPRAZolam 0.5 MG tablet    XANAX     Take 0.5 mg by mouth       cetirizine 10 MG tablet    zyrTEC     Take 10 mg by mouth       DHA-EPA-VITAMIN E PO      Take 5 mLs by mouth       hydrOXYzine 25 MG tablet    ATARAX    45 tablet    Take one-half tablet with dinner around 6pm and 1 tablet one hour before sleep.       LORazepam 0.5 MG tablet    ATIVAN    21 tablet    Take one tab BID (second dose after 3:00) x 5 days then increase to 3 tabs a day x 5 days then increase to 4 tabs a day if needed and tolerating       MULTIVITAMIN PO      Take by mouth daily       triamcinolone 0.1 % ointment    KENALOG    80 g    Apply topically 2 times daily Avoid face, underarms, groin       VITAMIN D PO      Take by mouth daily

## 2017-03-02 NOTE — PROGRESS NOTES
"  PSYCHIATRY CLINIC PROGRESS NOTE   The initial DIAG EVAL was 2/13/17.      INTERIM HISTORY                                                 PSYCH CRITICAL ITEM HISTORY includes suicidal ideation and CD: EtOH, THC, acid x1. Mental health issues were first experienced in childhood and he first received mental health care through therapist in grade school.      Hal Woo is a 26 year old male who was last seen in clinic on 2/13/17 at which time he completed initial diagnostic evaluation, was started on gabapentin 100mg QHS for anxiety given his history of reported side effects with first line treatments such as SSRIs. The patient took 1 dose of gabapentin and reported similar \"brain on fire\" sensation as he had with other medications. He was then switched to low dose hydroxyzine over the phone, instructed to take 12.5mg Q6PM and 25mg QHS. After his first day of hydroxyzine he reported increased sedation and started to only take 12.5mg QHS for the next several days. He was instructed to try lowest dose possible to see if he could tolerate hydroxyzine at 5mg, but he continued to report increase in depression and suicidal ideation that resolved when he stopped taking the medication.  The patient reports he has not been taking any medications since Friday evening.  History was provided by patient and father who was present for visit who are adequate historians.  Since the last visit:   Pt notes that his anxiety has continued to increase and has gotten worse due to the fact that he is also now experiencing symptoms of depression and had some passive suicidal ideation without plan or intent that was very ego-dystonic to him. He describes feeling like he is \"a sinking ship\" over the past week but denies current SI with intent or plan or other safety concerns warranting higher level of care. He states that he will have thoughts running through his head when he is out of his home that will be more of a commentary " telling him not to hurt others or himself. An example he gives is sitting at a light in his car and having someone walk across the crosswalk and he has fears he may hit the accelerator and run over the person, he has no desire to do these things he just worries about them constantly. He continues to have reservations regarding medications, especially retrial of any SSRI's or SNRI's. He has continued to be able to go to work, grad school and the gym but notes it is becoming increasingly more difficult.     RECENT SYMPTOMS:   PANIC ATTACK:  peaks in < 15-30 mins, occurs 1-2x per month, triggers are known [escape route cut off, leaving apartment], SOB and chest tightness along with feeling flushed, tremors and a sense of detachment with fear/dread; denies experiencing any GI sx, sweats, bryn-oral or extremity numbness, dizziness, change in smells.   ANXIETY:  excessive worry, fears going out of home [still able to go to work, school and gym but fears increase if escape routes blocked off], obsessions [worrying about possibility of hurting self or others], nervous/tense, fidgety/restless and overwhelmed  DEPRESSION:  depressed mood, insomnia , low energy, poor concentration /memory, suicidal ideation  and overwhelmed;  DENIES- hypersomnia  EATING DISORDER: none    RECENT SUBSTANCE USE:     ALCOHOL- 2-3 drink(s) per week, TOBACCO- none CAFFEINE- limits use now 2/2 anxiety   OPIOIDS- none / NARCAN KIT-  No    CANNABIS- none  OTHER ILLICIT DRUGS- none     CURRENT SOCIAL HISTORY:    Financial Support- working. Living Situation- lives alone. Children- none. Feels Safe at Home- Yes.     MEDICAL ROS:  Reports none.  Denies any physical health symptoms of concern..    PSYCH and CD Critical Summary Points since July 2016           None    PAST PSYCH MED TRIALS                                  see EMR Problem List: Hx of psychiatric care    MEDICAL / SURGICAL HISTORY                                   CARE TEAM:          PCP-   Brittany Penaloza                   Therapist- Sofía Ramirez MSW MPH [facility]    There is no problem list on file for this patient.    ALLERGY                                Pineapple and Seasonal allergies  MEDICATIONS                               Current Outpatient Prescriptions   Medication Sig Dispense Refill     LORazepam (ATIVAN) 0.5 MG tablet Take one tab BID (second dose after 3:00) x 5 days then increase to 3 tabs a day x 5 days then increase to 4 tabs a day if needed and tolerating 21 tablet 0     hydrOXYzine (ATARAX) 25 MG tablet Take one-half tablet with dinner around 6pm and 1 tablet one hour before sleep. 45 tablet 0     ALPRAZolam (XANAX) 0.5 MG tablet Take 0.5 mg by mouth       cetirizine (ZYRTEC) 10 MG tablet Take 10 mg by mouth       DHA-EPA-VITAMIN E PO Take 5 mLs by mouth       triamcinolone (KENALOG) 0.1 % ointment Apply topically 2 times daily Avoid face, underarms, groin 80 g 3     Multiple Vitamins-Minerals (MULTIVITAMIN PO) Take by mouth daily       Cholecalciferol (VITAMIN D PO) Take by mouth daily          VITALS   /78  Pulse 72  Wt 88.5 kg (195 lb)   MENTAL STATUS EXAM                                                             Alertness: alert  and oriented  Appearance: adequately groomed and appearance was notable for dressed appropriately for weather in brightly colored clothing including stocking hat.  Behavior/Demeanor: cooperative, with fair  eye contact   Speech: regular rate and rhythm  Language: intact  Psychomotor: fidgety  Mood:  anxious  Affect: tearful at times; was congruent to mood; was congruent to content  Thought Process/Associations: remarkable for noted difficulty of getting negative thoughts or thoughts about hurting others or self out of head, preoccupied not quite perseverative  Thought Content: Reports transient (intermittent), passive suicidal ideation with no plan or intent, also has egodystonic thoughts of not wanting to hurt others   Perception:  Denies no  "evidence of AH/VH does not appear to be responding to internal stimuli;    Insight: fair  Judgment: fair  Cognition:  does  appear grossly intact; formal cognitive testing was not done    LABS and DATA     AIMS:  N/A      PHQ9 TODAY = Not done      DIAGNOSIS     WILLIS with agoraphobia  R/o MDD     ASSESSMENT                                     Pertinent Background:   See most recent Transfer Evaluation as dated above.  Additionally, Pt notes mood symptoms that started in childhood. He saw a therapist at a young age (K-5th grade) for depression. A low level of anxiety has been present as well, first noticed an increase in anxiety around May 2011 when he graduated from college and moved in with his parents for 2 months in the fall of that year. October 2011 when he experienced his first \"panic attack\", while at work, in the context of overuse of caffeine to help aid a hangover from a previous night of drinking EtOH. Since 2011 he discusses having a high amount of \"fear and worry\" which is prominent when leaving his home and exacerbated by feeling like he is trapped and does not have an escape route. Has hx of passive SI no hx of attempts or plans. Experienced side effects to every medication tried aside from mirtazapine which was discontinued 2/2 weight gain.      TODAY: Pt continues to report constant heightened level of anxiety along with recent increase in depression and passive SI, he presented today with his father and appointment was originally scheduled for 30 minutes but given level of complexity appointment lasted >60minutes. Due to pts high level of anxiety and inability to tolerate SSRI's/SNRI's, gabapentin and hydroxyzine it appears he is a candidate that may benefit from short trial of scheduled benzodiazepine to decrease anxiety enough to tolerate trial of other psychotropic medications (SSRI/SNRI). Other options discussed with pt and father were trial of newer SSRI (Viibryd), B-Blocker (propranolol), " retrial of mirtazapine + topiramate for weight gain, or different class of medications (MAOIs). Given pts previous significant side effects to SSRI's he is hesitant to start a different medication in this class at this time. It was agreed by providers and patient that given his worsening anxiety, depression and recent SI he would likely most benefit from short course of scheduled benzodiazepines with plan to add SSRI/SNRI or mirtazapine+topirimate after ~1 month. Discussion was had regarding recent literature surroudning BZD use and dementia which was concerning for patient but he was agreeable to starting low dose lorazepam. He was also instructed not to combine this medication with EtOH and to not take this medication with the alprazolam he has previously been prescribed.     SUICIDE RISK ASSESSMENT:  Today Hal Woo reports he has been having passive SI but it has improved over the past few days and is egodystonic in nature, no plan or intent. In addition, he has notable risk factors for self-harm, including anxiety, insomnia, worsening symptoms of depression and singe status. However, risk is mitigated by no h/o suicide attempt, no h/o SIB, no h/o risky impulsive behavior, commitment to family, stable job, stable housing, h/o seeking help when needed and future oriented. Therefore, based on all available evidence including the factors cited above, he does not appear to be at imminent risk for self-harm, does not meet criteria for a 72-hr hold, and therefore involuntary hospitalization will not be pursued at this  time. However, based on degree of symptoms, voluntary referral for frequent clinic visits and med changes was recommended. he accepted this offer.  Additional steps to minimize risk: pts father was present for appointment. Limited medication supply given, and pt will be transferred to provider who can see pt more frequently.     PSYCHOTROPIC DRUG INTERACTIONS:   None with addition of  lorazepam.  MANAGEMENT:  N/A     PLAN                                                                                                       1) PSYCHOTROPIC MEDICATIONS:   - Stop hydroxyzine   - Start lorazepam 0.5mg: Take 1 tab BID and then increase by 1 tab every 5 days (if tolerating and needed) to a max of 2 tabs BID (for total of 2mg)   - Anticipate starting re-trial of SSRI or mirtazapine after 1 month of scheduled lorazepam and will plan for BZD taper      2) THERAPY:  Continue with therapy provided by outside therapist    3) LABS NEXT DUE:  Pt would likely benefit from basic labs, including TSH/T4 at next visit; He is also continuing to investigate coverage of genesight testing       RATING SCALES:  none needed    4) REFERRALS [CD, medical, other]:  none    5) :  none    6) RTC: 1 week.  Due to patients increased contact needs, and starting a controlled substance, his care will be transferred to Dr. Agnes Shepherd PGY-3 starting next week.    7) CRISIS NUMBERS:   Provided routinely in AVS  ONLY if a [facility] PT: [facility] [phone] (clinic), [phone] (after hours)   [facility] - [phone]  Crisis Residence [facility] - [phone]  Walk-In Counseling Center  [facility]     [phone]  COPE 24/7 [facility] Mobile Team for Adults [[phone]];  Child [[phone]]    [facility] - [phone]    TREATMENT RISK STATEMENT:  The risks, benefits, alternatives and potential adverse effects have been discussed and are understood by the patient/ patient's guardian. The pt understands the risks of using street drugs or alcohol.  There are no medical contraindications, the pt agrees to treatment with the ability to do so.  The patient understands to call 911 or come to the nearest ED if life threatening or urgent symptoms present.     RESIDENT:   She Rivera DO  PGY-2 Psychiatry Resident    Patient evaluated in clinic with Dr. Brown who will  sign the note.  Supervisor is Dr. Brown.  I saw the patient with the resident, and participated in key portions of the service, including the mental status examination and developing the plan of care. I reviewed key portions of the history with the resident. I agree with the findings and plan as documented in this note.  After reviewing a number of options including using a beta-blocker, Remeron plus metformin or Topamax, Viibryd or TCA--we decided to start a BZD given the extremely high anxiety state and associated not active but present suicidal ideation.  Overall plan is described above.    Rody Brown

## 2017-03-10 ENCOUNTER — OFFICE VISIT (OUTPATIENT)
Dept: PSYCHIATRY | Facility: CLINIC | Age: 27
End: 2017-03-10
Attending: PSYCHIATRY & NEUROLOGY
Payer: COMMERCIAL

## 2017-03-10 VITALS — HEART RATE: 65 BPM | SYSTOLIC BLOOD PRESSURE: 116 MMHG | DIASTOLIC BLOOD PRESSURE: 77 MMHG | WEIGHT: 195.4 LBS

## 2017-03-10 DIAGNOSIS — F41.1 GAD (GENERALIZED ANXIETY DISORDER): ICD-10-CM

## 2017-03-10 PROCEDURE — 99212 OFFICE O/P EST SF 10 MIN: CPT | Mod: ZF

## 2017-03-10 RX ORDER — LORAZEPAM 0.5 MG/1
TABLET ORAL
Qty: 45 TABLET | Refills: 0 | Status: SHIPPED | OUTPATIENT
Start: 2017-03-10 | End: 2017-03-24

## 2017-03-10 NOTE — NURSING NOTE
Chief Complaint   Patient presents with     Recheck Medication   WILLIS    Reviewed allergies, smoking status, and pharmacy preference  Administered abuse screening questions   Obtained weight, blood pressure and heart rate

## 2017-03-10 NOTE — MR AVS SNAPSHOT
After Visit Summary   3/10/2017    Hal Woo    MRN: [id number]           Patient Information     Date Of Birth          1990        Visit Information        Provider Department      3/10/2017 10:10 AM Agnes Shepherd MD Psychiatry Clinic        Today's Diagnoses     WILLIS (generalized anxiety disorder)           Follow-ups after your visit        Follow-up notes from your care team     Return in about 2 weeks (around 3/24/2017).      Your next 10 appointments already scheduled     Mar 24, 2017 10:40 AM CDT   Adult Med Follow UP with Agnes Shepherd MD   Psychiatry Clinic ([facility])    [address]  [address] [facility] [id number] [phone]            Apr 28, 2017  9:30 AM CDT   (Arrive by 9:15 AM)   Return Visit with Ry Loja MD   [facility] ([facility])    [address] [address] [phone]              Who to contact     Please call your clinic at [phone] to:    Ask questions about your health    Make or cancel appointments    Discuss your medicines    Learn about your test results    Speak to your doctor   If you have compliments or concerns about an experience at your clinic, or if you wish to file a complaint, please contact [facility] Patient Relations at [phone] or email us at [email or web address]         Additional Information About Your Visit        MyChart Information     RightsFlowt gives you secure access to your electronic health record. If you see a primary care provider, you can also send messages to your care team and make appointments. If you have questions, please call your primary care clinic.  If you do not have a primary care provider, please call [phone] and they will assist you.      Amazon is an electronic gateway that provides easy, online access to your medical records.  With Skritter, you can request a clinic appointment, read your test results, renew a prescription or communicate with your care team.     To access your existing account, please contact your [facility] or call [phone] for assistance.        Care EveryWhere ID     This is your Care EveryWhere ID. This could be used by other organizations to access your [place] medical records  [id number]        Your Vitals Were     Pulse                   65            Blood Pressure from Last 3 Encounters:   03/10/17 116/77   02/28/17 121/78   02/13/17 119/76    Weight from Last 3 Encounters:   03/10/17 88.6 kg (195 lb 6.4 oz)   02/28/17 88.5 kg (195 lb)   02/13/17 89.3 kg (196 lb 12.8 oz)              Today, you had the following     No orders found for display         Today's Medication Changes          These changes are accurate as of: 3/10/17 11:59 PM.  If you have any questions, ask your nurse or doctor.               These medicines have changed or have updated prescriptions.        Dose/Directions    LORazepam 0.5 MG tablet   Commonly known as:  ATIVAN   This may have changed:  additional instructions   Used for:  WILLIS (generalized anxiety disorder)   Changed by:  Agnes Shepherd MD        Take 1 tab (0.5 mg) in the morning and 2 tabs (1mg) in the evening. AFTER 1 week increase to 2 tabs (1 mg) BID   Quantity:  45 tablet   Refills:  0            Where to get your medicines      Some of these will need a paper prescription and others can be bought over the counter.  Ask your nurse if you have questions.     Bring a paper prescription for each of these medications     LORazepam 0.5 MG tablet                Primary Care Provider Office Phone # Fax #    Brittany Penaloza [phone]       [place] [id number] formerly [facility] [address] [facility] [address]        Thank you!     Thank you for choosing PSYCHIATRY CLINIC  for your care. Our goal is always to provide you with  excellent care. Hearing back from our patients is one way we can continue to improve our services. Please take a few minutes to complete the written survey that you may receive in the mail after your visit with us. Thank you!             Your Updated Medication List - Protect others around you: Learn how to safely use, store and throw away your medicines at www.disposemymeds.org.          This list is accurate as of: 3/10/17 11:59 PM.  Always use your most recent med list.                   Brand Name Dispense Instructions for use    cetirizine 10 MG tablet    zyrTEC     Take 10 mg by mouth       DHA-EPA-VITAMIN E PO      Take 5 mLs by mouth       LORazepam 0.5 MG tablet    ATIVAN    45 tablet    Take 1 tab (0.5 mg) in the morning and 2 tabs (1mg) in the evening. AFTER 1 week increase to 2 tabs (1 mg) BID       MULTIVITAMIN PO      Take by mouth daily       triamcinolone 0.1 % ointment    KENALOG    80 g    Apply topically 2 times daily Avoid face, underarms, groin       VITAMIN D PO      Take by mouth daily

## 2017-03-10 NOTE — PROGRESS NOTES
"  PSYCHIATRY CLINIC PROGRESS NOTE   The initial DIAG KARINA was 2/13/17.      INTERIM HISTORY                                                 PSYCH CRITICAL ITEM HISTORY includes suicidal ideation and CD: EtOH, THC, acid x1. Mental health issues were first experienced in childhood and he first received mental health care through therapist in grade school.      Hal Woo is a 26 year old male who was last seen in clinic on 2/28/17 at which time Ativan was started and hydroxyzine discontinued. The patient reports good treatment adherence.  History was provided by patient who was a good historian.  Since the last visit:   - Has been taking Ativan 0.5 mg BID as did not have enough pills to increase dose.   - Feels that he was \"bottoming out\" at last visit here with Bailee Rivera and Kevin. Since then has come out of the hole a little bit. Anxiety is a little bit improved and notes that he still has a long way to go.   - Was able to exercise at his apartment this morning. Continues to have difficulty exercising near work.   - Skipped class last evening because he was tired and anxious.   - Has ongoing thoughts of suicide but they leave his mind more quickly than previous and have not been as distressing.  - Has noticed that his sleep is \"different\" since starting lorazepam but can't characterize it further.     RECENT SYMPTOMS:   PANIC ATTACK:  peaks in < 15-30 mins, occurs 1-2x per month, triggers are known [escape route cut off, leaving apartment], SOB and chest tightness along with feeling flushed, tremors and a sense of detachment with fear/dread; denies experiencing any GI sx, sweats, bryn-oral or extremity numbness, dizziness, change in smells.   ANXIETY:  excessive worry, fears going out of home [still able to go to work, school and gym but fears increase if escape routes blocked off], obsessions [worrying about possibility of hurting self or others], nervous/tense, fidgety/restless and " overwhelmed  DEPRESSION:  depressed mood, insomnia , low energy, poor concentration /memory, suicidal ideation  and overwhelmed;  DENIES- hypersomnia  EATING DISORDER: none    RECENT SUBSTANCE USE:     ALCOHOL- 2-3 drink(s) per week  TOBACCO- none   CAFFEINE- limits use now 2/2 anxiety   OPIOIDS- none / NARCAN KIT-  No    CANNABIS- none  OTHER ILLICIT DRUGS- none     CURRENT SOCIAL HISTORY:    Financial Support- working. Living Situation- lives alone. Children- none. Feels Safe at Home- Yes.     MEDICAL ROS:  Reports none.  Denies any physical health symptoms of concern..    PSYCH and CD Critical Summary Points since July 2016           - Feb 2017: Tried hydroxyzine. Started Ativan    PAST PSYCH MED TRIALS                                  see EMR Problem List: Hx of psychiatric care    MEDICAL / SURGICAL HISTORY                                   CARE TEAM:          PCP- Dr. Brittany Penaloza                   Therapist- ARMANDO Bearden MPH LICSW    There is no problem list on file for this patient.    ALLERGY                                Pineapple and Seasonal allergies  MEDICATIONS                               Current Outpatient Prescriptions   Medication Sig Dispense Refill     LORazepam (ATIVAN) 0.5 MG tablet Take one tab BID (second dose after 3:00) x 5 days then increase to 3 tabs a day x 5 days then increase to 4 tabs a day if needed and tolerating 21 tablet 0     cetirizine (ZYRTEC) 10 MG tablet Take 10 mg by mouth       DHA-EPA-VITAMIN E PO Take 5 mLs by mouth       triamcinolone (KENALOG) 0.1 % ointment Apply topically 2 times daily Avoid face, underarms, groin 80 g 3     Multiple Vitamins-Minerals (MULTIVITAMIN PO) Take by mouth daily       Cholecalciferol (VITAMIN D PO) Take by mouth daily          VITALS   /77  Pulse 65  Wt 88.6 kg (195 lb 6.4 oz)   MENTAL STATUS EXAM                                                             Alertness: alert  and oriented  Appearance: adequately groomed and  "appearance was notable for dressed appropriately for weather in brightly colored clothing including stocking hat.  Behavior/Demeanor: cooperative, with fair  eye contact   Speech: regular rate and rhythm  Language: intact  Psychomotor: fidgety  Mood:  anxious  Affect: full range; was congruent to mood; was congruent to content  Thought Process/Associations: unremarkable    Thought Content: Reports transient (intermittent), passive suicidal ideation with no plan or intent that has decreased in frequency since last visit.  Perception:  Denies no evidence of AH/VH does not appear to be responding to internal stimuli;    Insight: fair  Judgment: fair  Cognition:  does  appear grossly intact; formal cognitive testing was not done    LABS and DATA     PHQ9 TODAY = 15  No flowsheet data found.      DIAGNOSIS     WILLIS with agoraphobia  R/o MDD     ASSESSMENT                                     Pertinent Background:  Pt notes mood symptoms that started in childhood. He saw a therapist at a young age (K-5th grade) for depression. A low level of anxiety has been present as well, first noticed an increase in anxiety around May 2011 when he graduated from college and moved in with his parents for 2 months in the fall of that year. October 2011 when he experienced his first \"panic attack\", while at work, in the context of overuse of caffeine to help aid a hangover from a previous night of drinking EtOH. Since 2011 he discusses having a high amount of \"fear and worry\" which is prominent when leaving his home and exacerbated by feeling like he is trapped and does not have an escape route. Has hx of passive SI no hx of attempts or plans. Experienced side effects to every medication tried aside from mirtazapine which was discontinued 2/2 weight gain.      TODAY: Hal is transferring care from Dr. Rivera due to increased frequency of appointments needed due to severity of current symptoms. At last visit was started on lorazepam. " Plan made was to try this for about a month at which time would re-trial mirtazapine or an SSRI. Today, discussed plan to increase lorazepam dose gradually over the next two weeks at which time we will meet again and likely start mirtazapine. Has experienced adverse effects to SSRIs in the past and found mirtazapine to be helpful so will plan to start with this. Again discussed short term nature of BZD use given the risks this medication class carries. He is aware. Again discouraged alcohol use while taking this medication.     SUICIDE RISK ASSESSMENT:  Today Hal Woo reports he has been having passive SI but it has improved over the past few days and is egodystonic in nature, no plan or intent. In addition, he has notable risk factors for self-harm, including anxiety, insomnia, worsening symptoms of depression and singe status. However, risk is mitigated by no h/o suicide attempt, no h/o SIB, no h/o risky impulsive behavior, commitment to family, stable job, stable housing, h/o seeking help when needed and future oriented. Therefore, based on all available evidence including the factors cited above, he does not appear to be at imminent risk for self-harm, does not meet criteria for a 72-hr hold, and therefore involuntary hospitalization will not be pursued at this  time. However, based on degree of symptoms, voluntary referral for frequent clinic visits and med changes was recommended. He accepted this offer.     PSYCHOTROPIC DRUG INTERACTIONS: None  MANAGEMENT:  N/A     PLAN                                                                                                       1) PSYCHOTROPIC MEDICATIONS:   - Continue lorazepam: (faxed to pharmacy)    - Increase to 0.5 mg QAM & 1 mg QHS x7 days     - THEN increase to 1 mg BID    2) THERAPY:  Continue    3) LABS NEXT DUE:  none       RATING SCALES:  none needed    4) REFERRALS [CD, medical, other]:  none    5) :  none    6) RTC: 2 weeks    7)  CRISIS NUMBERS:   Provided routinely in AVS    TREATMENT RISK STATEMENT:  The risks, benefits, alternatives and potential adverse effects have been discussed and are understood by the patient/ patient's guardian. The pt understands the risks of using street drugs or alcohol.  There are no medical contraindications, the pt agrees to treatment with the ability to do so.  The patient understands to call 911 or come to the nearest ED if life threatening or urgent symptoms present.     RESIDENT:   Agnes Shepherd MD    Patient not staffed in clinic. Note will be signed by supervisor, Dr. Osborne.    I did not see this patient directly.  I have reviewed the resident's documentation and agree with the plan of care.    Kyara Osborne MD

## 2017-03-11 ASSESSMENT — PATIENT HEALTH QUESTIONNAIRE - PHQ9: SUM OF ALL RESPONSES TO PHQ QUESTIONS 1-9: 15

## 2017-03-20 ENCOUNTER — OFFICE VISIT (OUTPATIENT)
Dept: FAMILY MEDICINE | Facility: CLINIC | Age: 27
End: 2017-03-20

## 2017-03-20 VITALS
DIASTOLIC BLOOD PRESSURE: 75 MMHG | SYSTOLIC BLOOD PRESSURE: 122 MMHG | HEART RATE: 63 BPM | TEMPERATURE: 98.7 F | OXYGEN SATURATION: 100 %

## 2017-03-20 DIAGNOSIS — R21 RASH AND NONSPECIFIC SKIN ERUPTION: Primary | ICD-10-CM

## 2017-03-20 RX ORDER — PREDNISONE 10 MG/1
TABLET ORAL
Qty: 14 TABLET | Refills: 0 | Status: SHIPPED | OUTPATIENT
Start: 2017-03-20 | End: 2017-03-28

## 2017-03-20 ASSESSMENT — PAIN SCALES - GENERAL: PAINLEVEL: MODERATE PAIN (4)

## 2017-03-20 NOTE — PROGRESS NOTES
Hal Woo is a 26 year old male with baseline moderate eczema who presents to [facility] with a worsening migratory rash that began about 3 days ago.Rash began on arms and has moved to the nasal bridge/face over the past two days. Nothing on trunk or stomach. Has small amount of rash on back, but that is chronic. Nothing new in groin or axilla. Has chronic eczema in those area.     Uses Kenalog  No known new food intake.      He also increased Ativan from 1.5 to 2 mg BID. This is monitored by Dr. Brown.       Believes that he may have had similar sx previously.     Review Of Systems:  Some sore throat from oversinging this past weekend.   No fevers. No sweats.     Has otherwise been in usual state of health, e.g.   Cardiovascular: negative  Respiratory: No shortness of breath, dyspnea on exertion, cough, or hemoptysis  Gastrointestinal: negative.No diarrhea.   Genitourinary: negative      Patient Active Problem List   Diagnosis     Anxiety state     Depression     Contact dermatitis     Disturbance in sleep behavior     Generalized anxiety disorder     Insomnia     Panic disorder     Panic disorder with agoraphobia     Vocal cord dysfunction     Vitamin D deficiency     PHQ-9 at 13. No feelings of self-harm  WILLIS-7 at 16    Current Outpatient Prescriptions   Medication Sig Dispense Refill     LORazepam (ATIVAN) 0.5 MG tablet Take 1 tab (0.5 mg) in the morning and 2 tabs (1mg) in the evening. AFTER 1 week increase to 2 tabs (1 mg) BID 45 tablet 0     cetirizine (ZYRTEC) 10 MG tablet Take 10 mg by mouth       triamcinolone (KENALOG) 0.1 % ointment Apply topically 2 times daily Avoid face, underarms, groin 80 g 3     Cholecalciferol (VITAMIN D PO) Take by mouth daily         Allergies   Allergen Reactions     Pineapple Difficulty breathing, Hives, Itching, Rash and Shortness Of Breath     Seasonal Allergies         Social:   Works as a  at [employer]    EXAM    Vitals: /75 (BP Location:  Left arm, Patient Position: Chair, Cuff Size: Adult Large)  Pulse 63  Temp 98.7  F (37.1  C)  SpO2 100%  BMI= There is no height or weight on file to calculate BMI.  Appears in no distress.   Skin: Face with some faint red dry skin beneath eyes.   Axilla and antecubital fossa with some reddish plaques that Subhash states are chronic.   Diffuse punctate dry and pruritic areas on forearms and legs.   Trunk and stomach not involved.   Normal CV, Lung and Abdominal exams. No lymphadenopathy    ASSESSMENT:  -Non-specific skin eruption    PLAN:  - Use Prednisone, 5, then 4, then 3, then 2 over the next 4 days.   Warned of side effects including increased energy after a few days.   -Continue cortisone creams  -Use moisturizing lotion  -Schedule with Dermatology in 2-4 days from now. Cancel if improved.       --Kvng Root MD  [facility], Department of Family Medicine and Community Health

## 2017-03-20 NOTE — MR AVS SNAPSHOT
After Visit Summary   3/20/2017    Hal Woo    MRN: [id number]           Patient Information     Date Of Birth          1990        Visit Information        Provider Department      3/20/2017 2:40 PM Kvng Root MD [facility]        Today's Diagnoses     Rash and nonspecific skin eruption    -  1      Care Instructions    PLAN:  - Use Prednisone, 5, then 4, then 3, then 2 over the next 4 days.   Warned of side effects including increased energy after a few days.   -Continue cortisone creams  -Use moisturizing lotion  -Schedule with Dermatology in 2-4 days from now. Cancel if improved.           Follow-ups after your visit        Additional Services     DERMATOLOGY REFERRAL       Your provider has referred you to: [facility]: Dermatology Clinic [facility] [phone]   [email or web address]    Appt in 2-4 days for skin eruption, non-specific    Please be aware that coverage of these services is subject to the terms and limitations of your health insurance plan.  Call member services at your health plan with any benefit or coverage questions.      Please bring the following with you to your appointment:    (1) Any X-Rays, CTs or MRIs which have been performed.  Contact the facility where they were done to arrange for  prior to your scheduled appointment.    (2) List of current medications  (3) This referral request   (4) Any documents/labs given to you for this referral                  Your next 10 appointments already scheduled     Mar 24, 2017 10:40 AM CDT   Adult Med Follow UP with Agnes Shepherd MD   Psychiatry Clinic ([facility])    [address]  [address] [facility] [address]   [phone]            Apr 28, 2017  9:30 AM CDT   (Arrive by 9:15 AM)   Return Visit with Ry Loja MD   [facility] ([facility])    [address] [place] [address]  Se  3rd Floor  [facility] [address] [phone]              Who to contact     Please call your clinic at [phone] to:    Ask questions about your health    Make or cancel appointments    Discuss your medicines    Learn about your test results    Speak to your doctor   If you have compliments or concerns about an experience at your clinic, or if you wish to file a complaint, please contact [facility] Patient Relations at [phone] or email us at [email or web address]         Additional Information About Your Visit        PromisePayhart Information     SpecifiedBy gives you secure access to your electronic health record. If you see a primary care provider, you can also send messages to your care team and make appointments. If you have questions, please call your primary care clinic.  If you do not have a primary care provider, please call [phone] and they will assist you.      SpecifiedBy is an electronic gateway that provides easy, online access to your medical records. With SpecifiedBy, you can request a clinic appointment, read your test results, renew a prescription or communicate with your care team.     To access your existing account, please contact your [facility] or call [phone] for assistance.        Care EveryWhere ID     This is your Care EveryWhere ID. This could be used by other organizations to access your [place] medical records  [id number]        Your Vitals Were     Pulse Temperature Pulse Oximetry             63 98.7  F (37.1  C) 100%          Blood Pressure from Last 3 Encounters:   03/20/17 122/75   01/18/17 121/79   01/11/17 124/84    Weight from Last 3 Encounters:   01/18/17 195 lb (88.5 kg)   01/11/17 196 lb (88.9 kg)              We Performed the Following     DERMATOLOGY REFERRAL          Today's Medication Changes          These changes are accurate as of: 3/20/17  3:18 PM.  If you have any questions, ask your  nurse or doctor.               Start taking these medicines.        Dose/Directions    predniSONE 10 MG tablet   Commonly known as:  DELTASONE   Used for:  Rash and nonspecific skin eruption   Started by:  Kvng Root MD        Take 5 pills today, then 4 tomorrow, then 3 on following day, then 2 on following day   Quantity:  14 tablet   Refills:  0            Where to get your medicines      These medications [id number] - [address] [address]     Phone:  [phone]     predniSONE 10 MG tablet                Primary Care Provider Office Phone # Fax #    Brittany Theresa Penaloza [phone]       [facility] [address] [facility] [address] [facility] [address]  [facility] [address]        Thank you!     Thank you for choosing [facility]  for your care. Our goal is always to provide you with excellent care. Hearing back from our patients is one way we can continue to improve our services. Please take a few minutes to complete the written survey that you may receive in the mail after your visit with us. Thank you!             Your Updated Medication List - Protect others around you: Learn how to safely use, store and throw away your medicines at www.disposemymeds.org.          This list is accurate as of: 3/20/17  3:18 PM.  Always use your most recent med list.                   Brand Name Dispense Instructions for use    cetirizine 10 MG tablet    zyrTEC     Take 10 mg by mouth       LORazepam 0.5 MG tablet    ATIVAN    45 tablet    Take 1 tab (0.5 mg) in the morning and 2 tabs (1mg) in the evening. AFTER 1 week increase to 2 tabs (1 mg) BID       predniSONE 10 MG tablet    DELTASONE    14 tablet    Take 5 pills today, then 4 tomorrow, then 3 on following day, then 2 on following day       triamcinolone 0.1 % ointment    KENALOG    80 g    Apply topically 2 times daily Avoid face, underarms, groin       VITAMIN D PO       Take by mouth daily

## 2017-03-20 NOTE — PATIENT INSTRUCTIONS
PLAN:  - Use Prednisone, 5, then 4, then 3, then 2 over the next 4 days.   Warned of side effects including increased energy after a few days.   -Continue cortisone creams  -Use moisturizing lotion  -Schedule with Dermatology in 2-4 days from now. Cancel if improved.

## 2017-03-20 NOTE — NURSING NOTE
26 year old  Chief Complaint   Patient presents with     Rash     mainly located upper body about a week. Worsen in the past few days.     Throat Pain     developed on Saturday. Pt doesn't think it's Strep.        Blood pressure 122/75, pulse 63, temperature 98.7  F (37.1  C), SpO2 100 %. There is no height or weight on file to calculate BMI.  There is no problem list on file for this patient.      Wt Readings from Last 2 Encounters:   01/18/17 195 lb (88.5 kg)   01/11/17 196 lb (88.9 kg)     BP Readings from Last 3 Encounters:   03/20/17 122/75   01/18/17 121/79   01/11/17 124/84         Current Outpatient Prescriptions   Medication     LORazepam (ATIVAN) 0.5 MG tablet     cetirizine (ZYRTEC) 10 MG tablet     triamcinolone (KENALOG) 0.1 % ointment     Cholecalciferol (VITAMIN D PO)     No current facility-administered medications for this visit.        Social History   Substance Use Topics     Smoking status: Never Smoker     Smokeless tobacco: Never Used     Alcohol use 0.0 oz/week     0 Standard drinks or equivalent per week       There are no preventive care reminders to display for this patient.    No results found for: PALLAVI Zhu CMA  March 20, 2017 2:52 PM

## 2017-03-21 ASSESSMENT — ANXIETY QUESTIONNAIRES
2. NOT BEING ABLE TO STOP OR CONTROL WORRYING: NEARLY EVERY DAY
1. FEELING NERVOUS, ANXIOUS, OR ON EDGE: NEARLY EVERY DAY
IF YOU CHECKED OFF ANY PROBLEMS ON THIS QUESTIONNAIRE, HOW DIFFICULT HAVE THESE PROBLEMS MADE IT FOR YOU TO DO YOUR WORK, TAKE CARE OF THINGS AT HOME, OR GET ALONG WITH OTHER PEOPLE: EXTREMELY DIFFICULT
5. BEING SO RESTLESS THAT IT IS HARD TO SIT STILL: SEVERAL DAYS
GAD7 TOTAL SCORE: 17
6. BECOMING EASILY ANNOYED OR IRRITABLE: SEVERAL DAYS
7. FEELING AFRAID AS IF SOMETHING AWFUL MIGHT HAPPEN: NEARLY EVERY DAY
3. WORRYING TOO MUCH ABOUT DIFFERENT THINGS: NEARLY EVERY DAY

## 2017-03-21 ASSESSMENT — PATIENT HEALTH QUESTIONNAIRE - PHQ9: 5. POOR APPETITE OR OVEREATING: NEARLY EVERY DAY

## 2017-03-22 ASSESSMENT — PATIENT HEALTH QUESTIONNAIRE - PHQ9: SUM OF ALL RESPONSES TO PHQ QUESTIONS 1-9: 14

## 2017-03-22 ASSESSMENT — ANXIETY QUESTIONNAIRES: GAD7 TOTAL SCORE: 17

## 2017-03-24 ENCOUNTER — OFFICE VISIT (OUTPATIENT)
Dept: PSYCHIATRY | Facility: CLINIC | Age: 27
End: 2017-03-24
Attending: PSYCHIATRY & NEUROLOGY
Payer: COMMERCIAL

## 2017-03-24 ENCOUNTER — TELEPHONE (OUTPATIENT)
Dept: PSYCHIATRY | Facility: CLINIC | Age: 27
End: 2017-03-24

## 2017-03-24 VITALS — HEART RATE: 74 BPM | DIASTOLIC BLOOD PRESSURE: 79 MMHG | SYSTOLIC BLOOD PRESSURE: 114 MMHG | WEIGHT: 195 LBS

## 2017-03-24 DIAGNOSIS — F41.1 GAD (GENERALIZED ANXIETY DISORDER): Primary | ICD-10-CM

## 2017-03-24 PROCEDURE — 99212 OFFICE O/P EST SF 10 MIN: CPT | Mod: ZF

## 2017-03-24 RX ORDER — LORAZEPAM 1 MG/1
1 TABLET ORAL 2 TIMES DAILY
Qty: 60 TABLET | Refills: 0 | Status: SHIPPED | OUTPATIENT
Start: 2017-03-24 | End: 2017-04-18

## 2017-03-24 RX ORDER — MIRTAZAPINE 15 MG/1
15 TABLET, FILM COATED ORAL AT BEDTIME
Qty: 30 TABLET | Refills: 0 | Status: SHIPPED | OUTPATIENT
Start: 2017-03-24 | End: 2017-03-29

## 2017-03-24 NOTE — MR AVS SNAPSHOT
After Visit Summary   3/24/2017    Hal Woo    MRN: [id number]           Patient Information     Date Of Birth          1990        Visit Information        Provider Department      3/24/2017 10:40 AM Agnes Shepherd MD [facility]        Today's Diagnoses     WILLIS (generalized anxiety disorder)    -  1      Care Instructions    - Start mirtazapine 15 mg at bedtime  - No change to ativan today    - Follow up in 3-4 weeks             Follow-ups after your visit        Your next 10 appointments already scheduled     Mar 28, 2017 11:00 AM CDT   (Arrive by 10:45 AM)   Return Visit with Josefina Rudolph MD   [facility] ([facility])    [address] [place] [id number] [phone]            Apr 18, 2017  8:40 AM CDT   Adult Med Follow UP with Agnes Shepherd MD   Psychiatry Clinic ([facility])    [place] [id number]  [address] [facility] [id number] [phone]            Apr 28, 2017  9:30 AM CDT   (Arrive by 9:15 AM)   Return Visit with Ry Loja MD   [facility] ([facility])    [address] [place] [id number] [phone]              Who to contact     Please call your clinic at [phone] to:    Ask questions about your health    Make or cancel appointments    Discuss your medicines    Learn about your test results    Speak to your doctor   If you have compliments or concerns about an experience at your clinic, or if you wish to file a complaint, please contact [facility] Patient Relations at [phone] or email us at [email or web address]         Additional Information About Your Visit        MyChart Information     [employer] gives you secure access to your electronic health record. If you see a primary care provider, you can also send messages to your  care team and make appointments. If you have questions, please call your primary care clinic.  If you do not have a primary care provider, please call [phone] and they will assist you.      [employer] is an electronic gateway that provides easy, online access to your medical records. With [employer], you can request a clinic appointment, read your test results, renew a prescription or communicate with your care team.     To access your existing account, please contact your [facility] or call [phone] for assistance.        Care EveryWhere ID     This is your Care EveryWhere ID. This could be used by other organizations to access your [place] medical records  [id number]        Your Vitals Were     Pulse                   74            Blood Pressure from Last 3 Encounters:   03/24/17 114/79   03/20/17 122/75   03/10/17 116/77    Weight from Last 3 Encounters:   03/24/17 88.5 kg (195 lb)   03/10/17 88.6 kg (195 lb 6.4 oz)   02/28/17 88.5 kg (195 lb)              Today, you had the following     No orders found for display         Today's Medication Changes          These changes are accurate as of: 3/24/17 11:30 AM.  If you have any questions, ask your nurse or doctor.               Start taking these medicines.        Dose/Directions    mirtazapine 15 MG tablet   Commonly known as:  REMERON   Used for:  WILLIS (generalized anxiety disorder)   Started by:  Agnes Shepherd MD        Dose:  15 mg   Take 1 tablet (15 mg) by mouth At Bedtime   Quantity:  30 tablet   Refills:  0         These medicines have changed or have updated prescriptions.        Dose/Directions    LORazepam 1 MG tablet   Commonly known as:  ATIVAN   This may have changed:    - medication strength  - how much to take  - how to take this  - when to take this  - additional instructions   Used for:  WILLIS (generalized anxiety disorder)   Changed by:  Agnes Shepherd MD        Dose:  1 mg   Take 1 tablet (1 mg)  by mouth 2 times daily   Quantity:  60 tablet   Refills:  0            Where to get your medicines      These medications were sent to [id number] - [place], [place] - [address] [address]     Phone:  [phone]     mirtazapine 15 MG tablet         Some of these will need a paper prescription and others can be bought over the counter.  Ask your nurse if you have questions.     Bring a paper prescription for each of these medications     LORazepam 1 MG tablet                Primary Care Provider Office Phone # Fax #    Brittany Penaloza [phone]       [facility] [address] [facility] [address]  [facility] [address]        Thank you!     Thank you for choosing PSYCHIATRY CLINIC  for your care. Our goal is always to provide you with excellent care. Hearing back from our patients is one way we can continue to improve our services. Please take a few minutes to complete the written survey that you may receive in the mail after your visit with us. Thank you!             Your Updated Medication List - Protect others around you: Learn how to safely use, store and throw away your medicines at www.disposemymeds.org.          This list is accurate as of: 3/24/17 11:30 AM.  Always use your most recent med list.                   Brand Name Dispense Instructions for use    cetirizine 10 MG tablet    zyrTEC     Take 10 mg by mouth       LORazepam 1 MG tablet    ATIVAN    60 tablet    Take 1 tablet (1 mg) by mouth 2 times daily       mirtazapine 15 MG tablet    REMERON    30 tablet    Take 1 tablet (15 mg) by mouth At Bedtime       predniSONE 10 MG tablet    DELTASONE    14 tablet    Take 5 pills today, then 4 tomorrow, then 3 on following day, then 2 on following day       triamcinolone 0.1 % ointment    KENALOG    80 g    Apply topically 2 times daily Avoid face, underarms, groin       VITAMIN D PO      Take by mouth daily

## 2017-03-24 NOTE — PROGRESS NOTES
PSYCHIATRY CLINIC PROGRESS NOTE   The initial DIAG EVAL was 2/13/17.      INTERIM HISTORY                                                 PSYCH CRITICAL ITEM HISTORY includes suicidal ideation and CD: EtOH, THC, acid x1. Mental health issues were first experienced in childhood and he first received mental health care through therapist in grade school.      Hal Woo is a 26 year old male who was last seen in clinic on 2/28/17 at which time Ativan was started and hydroxyzine discontinued. The patient reports good treatment adherence.  History was provided by patient who was a good historian.  Since the last visit:   - Some days are difficult but overall, things are better.  - Had a presentation on Wednesday evening which went well. Also had a big presentation yesterday that went. Has been less distracted by thoughts of anxiety. Has also been able to tolerate class better.  - Discussed long term plan of discontinuing Ativan if can find a better long term solution. Is reluctant to try new class of medication given h/o severe reactions.  - Looking forward to finishing this semester and upcoming celebrations.     RECENT SYMPTOMS:   PANIC ATTACK:  peaks in < 15-30 mins, occurs 1-2x per month, triggers are known [escape route cut off, leaving apartment], SOB and chest tightness along with feeling flushed, tremors and a sense of detachment with fear/dread; denies experiencing any GI sx, sweats, bryn-oral or extremity numbness, dizziness, change in smells.   ANXIETY:  excessive worry, fears going out of home [still able to go to work, school and gym but fears increase if escape routes blocked off], obsessions [worrying about possibility of hurting self or others], nervous/tense, fidgety/restless and overwhelmed  DEPRESSION:  depressed mood, insomnia , low energy, poor concentration /memory, suicidal ideation  and overwhelmed;  DENIES- hypersomnia  EATING DISORDER: none    RECENT SUBSTANCE USE:     ALCOHOL- 2-3  drink(s) per week  TOBACCO- none   CAFFEINE- limits use now 2/2 anxiety   OPIOIDS- none / NARCAN KIT-  No    CANNABIS- none  OTHER ILLICIT DRUGS- none     CURRENT SOCIAL HISTORY:    Financial Support- working. Living Situation- lives alone. Children- none. Feels Safe at Home- Yes.     MEDICAL ROS:  Reports none.  Denies any physical health symptoms of concern..    PSYCH and CD Critical Summary Points since July 2016           - Feb 2017: Tried hydroxyzine. Started Ativan    PAST PSYCH MED TRIALS                                  see EMR Problem List: Hx of psychiatric care    MEDICAL / SURGICAL HISTORY                                   CARE TEAM:          PCP- Dr. Brittany Penaloza                   Therapist- Sofía Ramirez, MSW MPH LICSW    Patient Active Problem List   Diagnosis     Anxiety state     Depression     Contact dermatitis     Disturbance in sleep behavior     Generalized anxiety disorder     Insomnia     Panic disorder     Panic disorder with agoraphobia     Vocal cord dysfunction     Vitamin D deficiency     ALLERGY                                Pineapple and Seasonal allergies  MEDICATIONS                               Current Outpatient Prescriptions   Medication Sig Dispense Refill     predniSONE (DELTASONE) 10 MG tablet Take 5 pills today, then 4 tomorrow, then 3 on following day, then 2 on following day 14 tablet 0     LORazepam (ATIVAN) 0.5 MG tablet Take 1 tab (0.5 mg) in the morning and 2 tabs (1mg) in the evening. AFTER 1 week increase to 2 tabs (1 mg) BID 45 tablet 0     cetirizine (ZYRTEC) 10 MG tablet Take 10 mg by mouth       triamcinolone (KENALOG) 0.1 % ointment Apply topically 2 times daily Avoid face, underarms, groin 80 g 3     Cholecalciferol (VITAMIN D PO) Take by mouth daily          VITALS   /79  Pulse 74  Wt 88.5 kg (195 lb)   MENTAL STATUS EXAM                                                             Alertness: alert  and oriented  Appearance: adequately groomed and  "appearance was notable for dressed appropriately for weather in brightly colored clothing including stocking hat.  Behavior/Demeanor: cooperative, with fair  eye contact   Speech: regular rate and rhythm  Language: intact  Psychomotor: fidgety  Mood:  anxious  Affect: full range; was congruent to mood; was congruent to content  Thought Process/Associations: unremarkable    Thought Content: Reports transient (intermittent), passive suicidal ideation with no plan or intent that has decreased in frequency since last visit.  Perception:  Denies no evidence of AH/VH does not appear to be responding to internal stimuli;    Insight: fair  Judgment: fair  Cognition:  does  appear grossly intact; formal cognitive testing was not done    LABS and DATA     PHQ9 TODAY = 10  No flowsheet data found.      DIAGNOSIS     WILLIS with agoraphobia  R/o MDD     ASSESSMENT                                     Pertinent Background:  Pt notes mood symptoms that started in childhood. He saw a therapist at a young age (K-5th grade) for depression. A low level of anxiety has been present as well, first noticed an increase in anxiety around May 2011 when he graduated from college and moved in with his parents for 2 months in the fall of that year. October 2011 when he experienced his first \"panic attack\", while at work, in the context of overuse of caffeine to help aid a hangover from a previous night of drinking EtOH. Since 2011 he discusses having a high amount of \"fear and worry\" which is prominent when leaving his home and exacerbated by feeling like he is trapped and does not have an escape route. Has hx of passive SI no hx of attempts or plans. Experienced side effects to every medication tried aside from mirtazapine which was discontinued 2/2 weight gain.      TODAY: Hal has noticed improvement of anxiety with Ativan. Is now at 1 mg BID. Had long discussion about potential long term options (mirtazapine, propranolol, Viibryd, MAOIs). " Ultimately decided on a trial of mirtazapine, will start at 15 mg and reassess in 3-4 weeks the need to increase to 30 mg or not. Will also watch appetite and weight as he experienced weight gain on this medication in the past. Again discussed short term nature of BZD use given the risks this medication class carries. He is aware. Again discouraged alcohol use while taking this medication.     SUICIDE RISK ASSESSMENT:  Today Hal Woo reports he has been having passive SI but it has improved over the past few days and is egodystonic in nature, no plan or intent. In addition, he has notable risk factors for self-harm, including anxiety, insomnia, worsening symptoms of depression and singe status. However, risk is mitigated by no h/o suicide attempt, no h/o SIB, no h/o risky impulsive behavior, commitment to family, stable job, stable housing, h/o seeking help when needed and future oriented. Therefore, based on all available evidence including the factors cited above, he does not appear to be at imminent risk for self-harm, does not meet criteria for a 72-hr hold, and therefore involuntary hospitalization will not be pursued at this  time. However, based on degree of symptoms, voluntary referral for frequent clinic visits and med changes was recommended. He accepted this offer.     PSYCHOTROPIC DRUG INTERACTIONS: None  MANAGEMENT:  N/A     PLAN                                                                                                       1) PSYCHOTROPIC MEDICATIONS:   - Continue lorazepam 1 mg BID   - Start mirtazapine 15 mg QHS    2) THERAPY:  Continue    3) LABS NEXT DUE:  none       RATING SCALES:  none needed    4) REFERRALS [CD, medical, other]:  none    5) :  none    6) RTC: 3-4 weeks    7) CRISIS NUMBERS:   Provided routinely in AVS    TREATMENT RISK STATEMENT:  The risks, benefits, alternatives and potential adverse effects have been discussed and are understood by the patient/  patient's guardian. The pt understands the risks of using street drugs or alcohol.  There are no medical contraindications, the pt agrees to treatment with the ability to do so.  The patient understands to call 911 or come to the nearest ED if life threatening or urgent symptoms present.     RESIDENT:   Agnes Shepherd MD    Patient not staffed in clinic. Note will be signed by supervisor, Dr. Osborne.    I did not see this patient directly.  I have reviewed the resident's documentation and agree with the plan of care.    Kyaar Osborne MD

## 2017-03-24 NOTE — TELEPHONE ENCOUNTER
A prescription ordered by Dr. Shepherd  for Ativan was successfully faxed to [facility] at [phone] on 3/24/2017  Original placed back in provider's folder.Danay Martinez/PHAM

## 2017-03-25 ASSESSMENT — PATIENT HEALTH QUESTIONNAIRE - PHQ9: SUM OF ALL RESPONSES TO PHQ QUESTIONS 1-9: 10

## 2017-03-28 ENCOUNTER — CARE COORDINATION (OUTPATIENT)
Dept: PSYCHIATRY | Facility: CLINIC | Age: 27
End: 2017-03-28

## 2017-03-28 ENCOUNTER — OFFICE VISIT (OUTPATIENT)
Dept: DERMATOLOGY | Facility: CLINIC | Age: 27
End: 2017-03-28

## 2017-03-28 DIAGNOSIS — L20.9 ATOPIC DERMATITIS, UNSPECIFIED TYPE: Primary | ICD-10-CM

## 2017-03-28 DIAGNOSIS — F41.1 GAD (GENERALIZED ANXIETY DISORDER): ICD-10-CM

## 2017-03-28 ASSESSMENT — PAIN SCALES - GENERAL: PAINLEVEL: NO PAIN (0)

## 2017-03-28 NOTE — PROGRESS NOTES
"[facility] Dermatology Note      Dermatology Problem List:  1. Adult-onset atopic dermatitis. Primarily flexural involvement.   - desonide 0.05% ointment BID PRN  - triamcinolone 0.1% ointment BID PRN for flares  - CeraVe eczema moisturizer    Encounter Date: Mar 28, 2017    CC:  Chief Complaint   Patient presents with     Derm Problem     Subhash is here today for a rash follow up. Subhash notes\" the rash is better but the area is still sensitive        History of Present Illness:  Mr. Hal Woo is a 26 year old male who presents in follow-up for his atopic dermatitis. He was last seen on 1/31/2017 per Dr. Loja when he was recommended to continue moisturziation with CeraVe twice daily and topical steroids as needed for flares. He states he unfortunately had a flare of his eczema about 2-weeks ago in setting of a recent viral URI with new rash on bilateral cheeks, ear lobes, as well as worsening of his prior eczema patches on his upper arms and antecubital fossa bilaterally. He saw his PCP who prescribed a 5-day taper of oral prednisone. He states he also been moisturizing twice daily with CeraVe Eczema cream and sporadically using desonide 0.05% ointment to his arms but not his face. Otherwise, health has been stable. He has recently increased his dose of ativan for chronic anxiety and is wondering if that may have contributed to his eczema flare. He has no other skin concerns.     Past Medical History:   Patient Active Problem List   Diagnosis     Anxiety state     Depression     Contact dermatitis     Disturbance in sleep behavior     Generalized anxiety disorder     Insomnia     Panic disorder     Panic disorder with agoraphobia     Vocal cord dysfunction     Vitamin D deficiency     History reviewed. No pertinent past medical history.  Past Surgical History:   Procedure Laterality Date     HC REMOVAL ADENOIDS,PRIMARY,<11 Y/O       HERNIA REPAIR      infancy     MANDIBLE SURGERY       " NOSE SURGERY      deviated septum       Social History:  The patient is a musician. 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    Family History:  There is no family history of skin cancer.    Medications:  Current Outpatient Prescriptions   Medication Sig Dispense Refill     mirtazapine (REMERON) 15 MG tablet Take 1 tablet (15 mg) by mouth At Bedtime 30 tablet 0     LORazepam (ATIVAN) 1 MG tablet Take 1 tablet (1 mg) by mouth 2 times daily 60 tablet 0     cetirizine (ZYRTEC) 10 MG tablet Take 10 mg by mouth       triamcinolone (KENALOG) 0.1 % ointment Apply topically 2 times daily Avoid face, underarms, groin 80 g 3     Cholecalciferol (VITAMIN D PO) Take by mouth daily       Allergies   Allergen Reactions     Pineapple Difficulty breathing, Hives, Itching, Rash and Shortness Of Breath     Seasonal Allergies          Review of Systems:  -As per HPI  -Constitutional: The patient denies fatigue, fevers, chills, unintended weight loss, and night sweats.  -HEENT: Patient denies nonhealing oral sores.  -Skin: As above in HPI. No additional skin concerns.    Physical exam:  Vitals: There were no vitals taken for this visit.  GEN: This is a well developed, well-nourished male in no acute distress, in a pleasant mood.    SKIN: Waist-up skin, which includes the head/face, neck, both arms, chest, back, abdomen, digits and/or nails was examined.  - On the bilateral antecubital fossa, right proximal upper extremity, there are few thin pink, eczematous patches and plaques with overlying scale.   - Mild pink-to-hyperpigmentated macules and patches of the bilateral cheeks.   - Scaly plaque on L helix  - No other lesions of concern on areas examined.     Impression/Plan:    1. Atopic dermatitis. Etiology was again reviewed with the patient. Suspect recent flare possibly in setting of a viral URI that has responded well to a brief course of systemic steroids. We recommended continuing twice daily moisturization to prevent eczema and  use of topical steroids as needed.     Continue CeraVe anti-itch moisturizer BID    Continue desonide 0.05% ointment BID PRN to face or body for mild eczema    Continue triamcinolone 0.1% ointment BID PRN to face or body for moderate or severe eczema. Advised to limit exposure to face to no more than 1-2 weeks to limit risk of atrophy. OK to apply to body for extended periods.     Follow-up as needed      Follow-up as needed, earlier for new or changing lesions.     Staff Involved:  Fito Dallas MD   PGY-2 Dermatology Resident  Pager [phone]  .I, Josefina Rudolph MD, saw this patient with the resident and agree with the resident s findings and plan of care as documented in the resident s note.

## 2017-03-28 NOTE — LETTER
"3/28/2017       RE: Hal Woo  [address]  [place] [facility] [address]     Dear Colleague,    Thank you for referring your patient, Hal Woo, to the [facility]. Please see a copy of my visit note below.    [facility] Dermatology Note      Dermatology Problem List:  1. Adult-onset atopic dermatitis. Primarily flexural involvement.   - desonide 0.05% ointment BID PRN  - triamcinolone 0.1% ointment BID PRN for flares  - CeraVe eczema moisturizer    Encounter Date: Mar 28, 2017    CC:  Chief Complaint   Patient presents with     Derm Problem     Subhash is here today for a rash follow up. Subhash notes\" the rash is better but the area is still sensitive        History of Present Illness:  Mr. Hal Woo is a 26 year old male who presents in follow-up for his atopic dermatitis. He was last seen on 1/31/2017 per Dr. Loja when he was recommended to continue moisturziation with CeraVe twice daily and topical steroids as needed for flares. He states he unfortunately had a flare of his eczema about 2-weeks ago in setting of a recent viral URI with new rash on bilateral cheeks, ear lobes, as well as worsening of his prior eczema patches on his upper arms and antecubital fossa bilaterally. He saw his PCP who prescribed a 5-day taper of oral prednisone. He states he also been moisturizing twice daily with CeraVe Eczema cream and sporadically using desonide 0.05% ointment to his arms but not his face. Otherwise, health has been stable. He has recently increased his dose of ativan for chronic anxiety and is wondering if that may have contributed to his eczema flare. He has no other skin concerns.     Past Medical History:   Patient Active Problem List   Diagnosis     Anxiety state     Depression     Contact dermatitis     Disturbance in sleep behavior     Generalized anxiety disorder     Insomnia     Panic disorder     Panic " disorder with agoraphobia     Vocal cord dysfunction     Vitamin D deficiency     History reviewed. No pertinent past medical history.  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    Family History:  There is no family history of skin cancer.    Medications:  Current Outpatient Prescriptions   Medication Sig Dispense Refill     mirtazapine (REMERON) 15 MG tablet Take 1 tablet (15 mg) by mouth At Bedtime 30 tablet 0     LORazepam (ATIVAN) 1 MG tablet Take 1 tablet (1 mg) by mouth 2 times daily 60 tablet 0     cetirizine (ZYRTEC) 10 MG tablet Take 10 mg by mouth       triamcinolone (KENALOG) 0.1 % ointment Apply topically 2 times daily Avoid face, underarms, groin 80 g 3     Cholecalciferol (VITAMIN D PO) Take by mouth daily       Allergies   Allergen Reactions     Pineapple Difficulty breathing, Hives, Itching, Rash and Shortness Of Breath     Seasonal Allergies          Review of Systems:  -As per HPI  -Constitutional: The patient denies fatigue, fevers, chills, unintended weight loss, and night sweats.  -HEENT: Patient denies nonhealing oral sores.  -Skin: As above in HPI. No additional skin concerns.    Physical exam:  Vitals: There were no vitals taken for this visit.  GEN: This is a well developed, well-nourished male in no acute distress, in a pleasant mood.    SKIN: Waist-up skin, which includes the head/face, neck, both arms, chest, back, abdomen, digits and/or nails was examined.  - On the bilateral antecubital fossa, right proximal upper extremity, there are few thin pink, eczematous patches and plaques with overlying scale.   - Mild pink-to-hyperpigmentated macules and patches of the bilateral cheeks.   - Scaly plaque on L helix  - No other lesions of concern on areas examined.      Impression/Plan:    1. Atopic dermatitis. Etiology was again reviewed with the patient. Suspect recent flare possibly in setting of a viral URI that has responded well to a brief course of systemic steroids. We recommended continuing twice daily moisturization to prevent eczema and use of topical steroids as needed.     Continue CeraVe anti-itch moisturizer BID    Continue desonide 0.05% ointment BID PRN to face or body for mild eczema    Continue triamcinolone 0.1% ointment BID PRN to face or body for moderate or severe eczema. Advised to limit exposure to face to no more than 1-2 weeks to limit risk of atrophy. OK to apply to body for extended periods.     Follow-up as needed      Follow-up as needed, earlier for new or changing lesions.     Staff Involved:  Fito Dallas MD   PGY-2 Dermatology Resident  Pager [phone]  .I, Josefina Rudolph MD, saw this patient with the resident and agree with the resident s findings and plan of care as documented in the resident s note.

## 2017-03-28 NOTE — MR AVS SNAPSHOT
After Visit Summary   3/28/2017    Hal Woo    MRN: [id number]           Patient Information     Date Of Birth          1990        Visit Information        Provider Department      3/28/2017 11:00 AM Josefina Rudolph MD [facility] Dermatology        Today's Diagnoses     Atopic dermatitis, unspecified type    -  1       Follow-ups after your visit        Your next 10 appointments already scheduled     Apr 18, 2017  8:40 AM CDT   Adult Med Follow UP with Agnes Shepherd MD   Psychiatry Clinic ([facility])    [address]  [address] [facility] [id number] [phone]              Who to contact     Please call your clinic at [phone] to:    Ask questions about your health    Make or cancel appointments    Discuss your medicines    Learn about your test results    Speak to your doctor   If you have compliments or concerns about an experience at your clinic, or if you wish to file a complaint, please contact [facility] Patient Relations at [phone] or email us at [email or web address]         Additional Information About Your Visit        MyChart Information     [employer] gives you secure access to your electronic health record. If you see a primary care provider, you can also send messages to your care team and make appointments. If you have questions, please call your primary care clinic.  If you do not have a primary care provider, please call [phone] and they will assist you.      [employer] is an electronic gateway that provides easy, online access to your medical records. With from[employer]to[employer], you can request a clinic appointment, read your test results, renew a prescription or communicate with your care team.     To access your existing account, please contact your [facility] or call [phone] for assistance.        Care EveryWhere ID     This is your Care  EveryWhere ID. This could be used by other organizations to access your [place] medical records  [id number]         Blood Pressure from Last 3 Encounters:   03/20/17 122/75   01/18/17 121/79   01/11/17 124/84    Weight from Last 3 Encounters:   01/18/17 88.5 kg (195 lb)   01/11/17 88.9 kg (196 lb)              Today, you had the following     No orders found for display         Today's Medication Changes          These changes are accurate as of: 3/28/17 11:59 PM.  If you have any questions, ask your nurse or doctor.               These medicines have changed or have updated prescriptions.        Dose/Directions    mirtazapine 15 MG tablet   Commonly known as:  REMERON   This may have changed:  how much to take   Used for:  WILLIS (generalized anxiety disorder)   Changed by:  Alix Jewell RN        Dose:  7.5 mg   Take 0.5 tablets (7.5 mg) by mouth At Bedtime   Quantity:  30 tablet   Refills:  0         Stop taking these medicines if you haven't already. Please contact your care team if you have questions.     predniSONE 10 MG tablet   Commonly known as:  DELTASONE   Stopped by:  Josefina Rudolph MD                Where to get your medicines      Some of these will need a paper prescription and others can be bought over the counter.  Ask your nurse if you have questions.     You don't need a prescription for these medications     mirtazapine 15 MG tablet                Primary Care Provider Office Phone # Fax #    Brittany Penaloza [phone]       [address] [address]        Thank you!     Thank you for choosing [facility] DERMATOLOGY  for your care. Our goal is always to provide you with excellent care. Hearing back from our patients is one way we can continue to improve our services. Please take a few minutes to complete the written survey that you may receive in the mail after your visit with us. Thank you!             Your Updated Medication  List - Protect others around you: Learn how to safely use, store and throw away your medicines at www.disposemymeds.org.          This list is accurate as of: 3/28/17 11:59 PM.  Always use your most recent med list.                   Brand Name Dispense Instructions for use    cetirizine 10 MG tablet    zyrTEC     Take 10 mg by mouth       LORazepam 1 MG tablet    ATIVAN    60 tablet    Take 1 tablet (1 mg) by mouth 2 times daily       mirtazapine 15 MG tablet    REMERON    30 tablet    Take 0.5 tablets (7.5 mg) by mouth At Bedtime       triamcinolone 0.1 % ointment    KENALOG    80 g    Apply topically 2 times daily Avoid face, underarms, groin       VITAMIN D PO      Take by mouth daily

## 2017-03-28 NOTE — NURSING NOTE
"Dermatology Rooming Note    Hal Woo's goals for this visit include:   Chief Complaint   Patient presents with     Derm Problem     Subhash is here today for a rash follow up. Subhash notes\" the rash is better but the area is still sensitive      Dannielle Tello MA    "

## 2017-03-28 NOTE — PROGRESS NOTES
"Roxanne Ferreira Michelle, RN        Phone Number: [phone] (Call me)                     The pt is caller. States he's had a large increase in drowsiness, anxiety, and appetite since last Friday when he started the Mirtazapine.     Ok to LVM- he'll be available after noon today.         Last appt: 3/24/17  Pend: 4/18/17    Per last office note: Start mirtazapine 15 mg QHS    Returned call to pt who reports that he started mirtazapine 15 mg QHS on 3/24.  Reports that he takes med at 2100 and feels \"dead tired\" at 2200.  Falls asleep at this time, however wakes multiple times during the night with anxiety.  States that he is also \"exhausted\" upon waking and is able to fall back asleep.  Also notes that when he wakes during the night, he feels confused and responses are slowed.  Does not give a specific example.  Wakes after 9 hours of rest.  Prior to starting med, slept 7-8 hours nightly.  Pt believe his anxiety is r/t increased sedation.      Pt has trialed med in the past and is aware that SE like sedation and increased appetite are expected.  Reports that he also experienced increased anxiety during previous mirtazapine trial and this improved after stopping the medication.  Discussed possibility of decreasing dose to half however pt is concerned that this will worsen sedation, as he was previously informed of this.  Pt wonders if he should continue with current dose with hopes that sx will improve or consider an alternate med trial.      Writer stated would send msg to provider and c/b with recommendations.  If no recommendations tonight, pt plans to continue with mirtazpine 15 mg at HS.  "

## 2017-03-29 RX ORDER — MIRTAZAPINE 15 MG/1
7.5 TABLET, FILM COATED ORAL AT BEDTIME
Qty: 30 TABLET | Refills: 0
Start: 2017-03-29 | End: 2017-03-30

## 2017-03-29 NOTE — PROGRESS NOTES
Phone Note - 10:39 am    Called pt to discuss concerns. LVM.     Would like to encourage him to try 7.5 mg (by cutting 15 mg tabs in half) for a while. Had previously discussed with him that this may be more sedating though can be either at 7.5 or 15 mg and is person dependent. Given his h/o medication sensitivity would encourage him to try this for a couple weeks. Again would want to discuss that the first 1-2 weeks can be worse and would anticipate improvement in the sedation after that time.     =============================================================================  Agnes Shepherd M.D.  PGY-3 Psychiatry Resident  _____________________________________________________________________________________      Phone Note - 11:13 AM    Spoke with pt. Discussed aforementioned topics. Given h/o sensitivity, will decrease medication. Encouraged waiting 1-2 weeks to see if AE resolve or to call back if not going well.     Plan:  - Decrease Mirtazapine to 7.5 mg QHS (pt will cut tabs in half)    =============================================================================  Agnes Shepherd M.D.  PGY-3 Psychiatry Resident

## 2017-03-30 RX ORDER — MIRTAZAPINE 15 MG/1
15 TABLET, FILM COATED ORAL AT BEDTIME
Qty: 30 TABLET | Refills: 0 | COMMUNITY
Start: 2017-03-30 | End: 2017-04-18

## 2017-03-30 NOTE — PROGRESS NOTES
"Van Richards Michelle, RN        Phone Number: [phone] (Call me)                     Caller:  Self   Medication:  (REMERON)   Pharmacy and location:[facility] #[id number] [address]   [phone] (Phone)   [phone] (Fax)   Symptoms:  Increase Downiness   Symptom onset: 3/24/17   Pending appt: 4/18/17   Okay to leave VM:  Yes     Thanks              Returned call to pt who reports to have decreased mirtazapine to 7.5 mg last night.  States \"it got worse and I couldn't go into work today\".  Reports that he drank coffee to mitigate the sedation, which was helpful for about 90 mins, but in turn caused anxiety.  Plans to exercise to channel this energy.  Writer encourage pt to consume water as well.  Pt reports that he would like to return to 15 mg QHS as this was original plan, even though sedation was \"not ideal\".  Writer encouraged pt to do this.  Pt seems hopeful that sx will subside within 1-2 weeks.  Reinforced previous msg per Dr. Shepherd.  Pt denies need for a c/b.    Routed to Dr. Shepherd as NINA  "

## 2017-04-18 ENCOUNTER — OFFICE VISIT (OUTPATIENT)
Dept: PSYCHIATRY | Facility: CLINIC | Age: 27
End: 2017-04-18
Attending: PSYCHIATRY & NEUROLOGY
Payer: COMMERCIAL

## 2017-04-18 VITALS — HEART RATE: 71 BPM | WEIGHT: 201.8 LBS | DIASTOLIC BLOOD PRESSURE: 76 MMHG | SYSTOLIC BLOOD PRESSURE: 113 MMHG

## 2017-04-18 DIAGNOSIS — F41.1 GAD (GENERALIZED ANXIETY DISORDER): Primary | ICD-10-CM

## 2017-04-18 PROCEDURE — 99212 OFFICE O/P EST SF 10 MIN: CPT | Mod: ZF

## 2017-04-18 RX ORDER — LORAZEPAM 1 MG/1
1 TABLET ORAL 2 TIMES DAILY
Qty: 60 TABLET | Refills: 0 | Status: SHIPPED | OUTPATIENT
Start: 2017-04-18 | End: 2017-05-16

## 2017-04-18 RX ORDER — SERTRALINE HYDROCHLORIDE 25 MG/1
TABLET, FILM COATED ORAL
Qty: 35 TABLET | Refills: 0 | Status: SHIPPED | OUTPATIENT
Start: 2017-04-18 | End: 2017-05-16

## 2017-04-18 NOTE — MR AVS SNAPSHOT
After Visit Summary   4/18/2017    Hal Woo    MRN: [id number]           Patient Information     Date Of Birth          1990        Visit Information        Provider Department      4/18/2017 8:40 AM Agnes Shepherd MD Psychiatry Clinic        Today's Diagnoses     WILLIS (generalized anxiety disorder)    -  1       Follow-ups after your visit        Follow-up notes from your care team     Return in about 4 weeks (around 5/16/2017).      Your next 10 appointments already scheduled     May 16, 2017  8:40 AM CDT   Adult Med Follow UP with Agnes Shepherd MD   Psychiatry Clinic ([facility])    [address]  [address] [facility] [address] [phone]              Who to contact     Please call your clinic at [phone] to:    Ask questions about your health    Make or cancel appointments    Discuss your medicines    Learn about your test results    Speak to your doctor   If you have compliments or concerns about an experience at your clinic, or if you wish to file a complaint, please contact [facility] Patient Relations at [phone] or email us at [email or web address]         Additional Information About Your Visit        MyChart Information     "[employer]"t gives you secure access to your electronic health record. If you see a primary care provider, you can also send messages to your care team and make appointments. If you have questions, please call your primary care clinic.  If you do not have a primary care provider, please call [phone] and they will assist you.      [employer] is an electronic gateway that provides easy, online access to your medical records. With [employer], you can request a clinic appointment, read your test results, renew a prescription or communicate with your care team.     To access your existing account, please contact your [facility] or  call [phone] for assistance.        Care EveryWhere ID     This is your Care EveryWhere ID. This could be used by other organizations to access your [place] medical records  [id number]        Your Vitals Were     Pulse                   71            Blood Pressure from Last 3 Encounters:   04/18/17 113/76   03/24/17 114/79   03/20/17 122/75    Weight from Last 3 Encounters:   04/18/17 91.5 kg (201 lb 12.8 oz)   03/24/17 88.5 kg (195 lb)   03/10/17 88.6 kg (195 lb 6.4 oz)              Today, you had the following     No orders found for display         Today's Medication Changes          These changes are accurate as of: 4/18/17 11:59 PM.  If you have any questions, ask your nurse or doctor.               Start taking these medicines.        Dose/Directions    sertraline 25 MG tablet   Commonly known as:  ZOLOFT   Used for:  WILLIS (generalized anxiety disorder)   Started by:  Agnes Shepherd MD        Take 0.5 tab (12.5 mg) daily for one week, THEN increase by 12.5 mg each week   Quantity:  35 tablet   Refills:  0         Stop taking these medicines if you haven't already. Please contact your care team if you have questions.     REMERON 15 MG tablet   Generic drug:  mirtazapine   Stopped by:  Agnes Shepherd MD                Where to get your medicines      These medications were sent to [facility] #[id number] - [address] [address]     Phone:  [phone]     sertraline 25 MG tablet         Some of these will need a paper prescription and others can be bought over the counter.  Ask your nurse if you have questions.     Bring a paper prescription for each of these medications     LORazepam 1 MG tablet                Primary Care Provider Office Phone # Fax #    Brittany Penaloza [phone]       [place] [address] [facility] [address]  [facility] [address]        Thank you!     Thank you for choosing  PSYCHIATRY CLINIC  for your care. Our goal is always to provide you with excellent care. Hearing back from our patients is one way we can continue to improve our services. Please take a few minutes to complete the written survey that you may receive in the mail after your visit with us. Thank you!             Your Updated Medication List - Protect others around you: Learn how to safely use, store and throw away your medicines at www.disposemymeds.org.          This list is accurate as of: 4/18/17 11:59 PM.  Always use your most recent med list.                   Brand Name Dispense Instructions for use    cetirizine 10 MG tablet    zyrTEC     Take 10 mg by mouth       LORazepam 1 MG tablet    ATIVAN    60 tablet    Take 1 tablet (1 mg) by mouth 2 times daily       sertraline 25 MG tablet    ZOLOFT    35 tablet    Take 0.5 tab (12.5 mg) daily for one week, THEN increase by 12.5 mg each week       triamcinolone 0.1 % ointment    KENALOG    80 g    Apply topically 2 times daily Avoid face, underarms, groin       VITAMIN D PO      Take by mouth daily

## 2017-04-18 NOTE — PROGRESS NOTES
"  PSYCHIATRY CLINIC PROGRESS NOTE   The initial DIAG KARINA was 2/13/17.      INTERIM HISTORY                                                 PSYCH CRITICAL ITEM HISTORY includes suicidal ideation and CD: EtOH, THC, acid x1. Mental health issues were first experienced in childhood and he first received mental health care through therapist in grade school.      Hal Woo is a 27 year old male who was last seen in clinic on 3/24/17 at which time mirtazapine was started. The patient reports good treatment adherence.  History was provided by patient who was a good historian.  Since the last visit:   - Things continue to be okay but feels that mirtazapine was a bit of a backslide. Is sedating, was more sedating at 7.5 mg dose and only did this for a night. Continues to have ongoing anxiety. Feels things get worse on days that are more humid and that he feels \"messed up\" in the head. Describes this as a pressure, difficulty focusing, and confusion.   - Weight has increased 6 pounds since last visit. Points out that he has been eating more carbs as he's been trying to become a vegan over the past few months and also has been lifting weights. Did have weight gain issues on this medication in the past.   - School is over for the semester.     RECENT SYMPTOMS:   PANIC ATTACK:  peaks in < 15-30 mins, occurs 1-2x per month, triggers are known [escape route cut off, leaving apartment], SOB and chest tightness along with feeling flushed, tremors and a sense of detachment with fear/dread; denies experiencing any GI sx, sweats, bryn-oral or extremity numbness, dizziness, change in smells.   ANXIETY:  excessive worry, fears going out of home [still able to go to work, school and gym but fears increase if escape routes blocked off], obsessions [worrying about possibility of hurting self or others], nervous/tense, fidgety/restless and overwhelmed  DEPRESSION:  depressed mood, insomnia , low energy, poor concentration /memory, " suicidal ideation  and overwhelmed;  DENIES- hypersomnia  EATING DISORDER: none    RECENT SUBSTANCE USE:     ALCOHOL- 2-3 drink(s) per week  TOBACCO- none   CAFFEINE- limits use now 2/2 anxiety   OPIOIDS- none / NARCAN KIT-  No    CANNABIS- none  OTHER ILLICIT DRUGS- none     CURRENT SOCIAL HISTORY:    Financial Support- working. Living Situation- lives alone. Children- none. Feels Safe at Home- Yes.     MEDICAL ROS:  Reports weight gain.  Denies any physical health symptoms of concern..    PSYCH and CD Critical Summary Points since July 2016           - Feb 2017: Tried hydroxyzine. Started Ativan  - March 2017: Started mirtazapine    PAST PSYCH MED TRIALS                                  see EMR Problem List: Hx of psychiatric care    MEDICAL / SURGICAL HISTORY                                   CARE TEAM:          PCP- Dr. Brittany Penaloza                   Therapist- Sofía Ramirez MSW MPH LICSW    Patient Active Problem List   Diagnosis     Anxiety state     Depression     Contact dermatitis     Disturbance in sleep behavior     Generalized anxiety disorder     Insomnia     Panic disorder     Panic disorder with agoraphobia     Vocal cord dysfunction     Vitamin D deficiency     ALLERGY                                Pineapple and Seasonal allergies  MEDICATIONS                               Current Outpatient Prescriptions   Medication Sig Dispense Refill     mirtazapine (REMERON) 15 MG tablet Take 1 tablet (15 mg) by mouth At Bedtime 30 tablet 0     LORazepam (ATIVAN) 1 MG tablet Take 1 tablet (1 mg) by mouth 2 times daily 60 tablet 0     cetirizine (ZYRTEC) 10 MG tablet Take 10 mg by mouth       triamcinolone (KENALOG) 0.1 % ointment Apply topically 2 times daily Avoid face, underarms, groin 80 g 3     Cholecalciferol (VITAMIN D PO) Take by mouth daily          VITALS   /76  Pulse 71  Wt 91.5 kg (201 lb 12.8 oz)   MENTAL STATUS EXAM                                                             Alertness:  "alert  and oriented  Appearance: adequately groomed and appearance was notable for dressed appropriately for weather in brightly colored clothing including stocking hat.  Behavior/Demeanor: cooperative, with fair  eye contact   Speech: regular rate and rhythm  Language: intact  Psychomotor: fidgety  Mood:  anxious  Affect: full range; was congruent to mood; was congruent to content  Thought Process/Associations: unremarkable    Thought Content: Denies suicidal ideation    Perception:  Denies no evidence of AH/VH does not appear to be responding to internal stimuli;    Insight: fair  Judgment: fair  Cognition:  does  appear grossly intact; formal cognitive testing was not done    LABS and DATA     PHQ9 TODAY = 10  No flowsheet data found.      DIAGNOSIS     WILLIS with agoraphobia  R/o MDD     ASSESSMENT                                     Pertinent Background:  Pt notes mood symptoms that started in childhood. He saw a therapist at a young age (K-5th grade) for depression. A low level of anxiety has been present as well, first noticed an increase in anxiety around May 2011 when he graduated from college and moved in with his parents for 2 months in the fall of that year. October 2011 when he experienced his first \"panic attack\", while at work, in the context of overuse of caffeine to help aid a hangover from a previous night of drinking EtOH. Since 2011 he discusses having a high amount of \"fear and worry\" which is prominent when leaving his home and exacerbated by feeling like he is trapped and does not have an escape route. Has hx of passive SI no hx of attempts or plans. Experienced side effects to every medication tried aside from mirtazapine which was discontinued 2/2 weight gain.      TODAY: Subhash has not noticed improvement since starting mirtazapine. Has gained 6 pounds in the past 3-4 weeks after having stable weight for months. Did try lower dose (7.5 mg) briefly and found this to be quite sedating. Due to " weight gain, will discontinue this medication. Now that overall anxiety is decreased since starting Ativan, would like to try a different SSRI to target anxiety. Will start sertraline at 12.5 mg and increase by 12.5 mg each week if tolerating to try to minimize side effects. Again discussed short term nature of BZD use given the risks this medication class carries. He is aware.     SUICIDE RISK ASSESSMENT:  Today Hal Woo denies SI. In addition, he has notable risk factors for self-harm, including anxiety, insomnia, worsening symptoms of depression and singe status. However, risk is mitigated by no h/o suicide attempt, no h/o SIB, no h/o risky impulsive behavior, commitment to family, stable job, stable housing, h/o seeking help when needed and future oriented. Therefore, based on all available evidence including the factors cited above, he does not appear to be at imminent risk for self-harm, does not meet criteria for a 72-hr hold, and therefore involuntary hospitalization will not be pursued at this  time. However, based on degree of symptoms, voluntary referral for frequent clinic visits and med changes was recommended. He accepted this offer.     PSYCHOTROPIC DRUG INTERACTIONS: None  MANAGEMENT:  N/A     PLAN                                                                                                       1) PSYCHOTROPIC MEDICATIONS:   - Continue lorazepam 1 mg BID (Rx faxed to pharmacy)   - Start sertraline 12.5 mg daily and increase by 12.5 mg each week if tolerating    - Discontinue mirtazapine 15 mg QHS    2) THERAPY:  Continue    3) LABS NEXT DUE:  none       RATING SCALES:  none needed    4) REFERRALS [CD, medical, other]:  none    5) :  none    6) RTC: 3-4 weeks    7) CRISIS NUMBERS:   Provided routinely in AVS    TREATMENT RISK STATEMENT:  The risks, benefits, alternatives and potential adverse effects have been discussed and are understood by the patient/ patient's guardian.  The pt understands the risks of using street drugs or alcohol.  There are no medical contraindications, the pt agrees to treatment with the ability to do so.  The patient understands to call 911 or come to the nearest ED if life threatening or urgent symptoms present.     RESIDENT:   Agnes Shepherd MD    Patient staffed in clinic by Dr. Brown who will sign note. Supervisor Dr. Osborne.  I saw the patient with the resident, and participated in key portions of the service, including the mental status examination and developing the plan of care. I reviewed key portions of the history with the resident. I agree with the findings and plan as documented in this note.    Rody Brown

## 2017-04-19 ENCOUNTER — TELEPHONE (OUTPATIENT)
Dept: PSYCHIATRY | Facility: CLINIC | Age: 27
End: 2017-04-19

## 2017-04-19 ASSESSMENT — PATIENT HEALTH QUESTIONNAIRE - PHQ9: SUM OF ALL RESPONSES TO PHQ QUESTIONS 1-9: 10

## 2017-04-19 NOTE — TELEPHONE ENCOUNTER
"A prescription ordered by Dr. Shepherd  for Ativan was successfully faxed to [facility] at  [phone]  on ? (no cover sheet attached - date faxed unknown).  Prescription is red stamped \"faxed\" but no date is included.  Original placed back in provider's folder (Dr. Shepherd)Danay Martinez/PHAM        "

## 2017-05-01 ENCOUNTER — TELEPHONE (OUTPATIENT)
Dept: PSYCHIATRY | Facility: CLINIC | Age: 27
End: 2017-05-01

## 2017-05-01 NOTE — TELEPHONE ENCOUNTER
----- Message from Alix Jewell RN sent at 5/1/2017  9:55 AM CDT -----  Contact: [phone]  Any chance you might have time to call pt today?  If not, I will try to get to it by this afternoon.  Thanks!    ----- Message -----     From: Cari Aguirre     Sent: 5/1/2017   9:30 AM       To: RUDOLPH Sherwood/Alix    Caller:  patient  Medication:  zoloft  Pharmacy and location:    Symptoms: heightened anxiety and poor sleeping  Symptom onset: yesterday  Pending appt: 5/16/17  Okay to leave VM:  yes

## 2017-05-01 NOTE — TELEPHONE ENCOUNTER
Phone Note    Called pt. Started Zoloft a few weeks ago. Increased to 25 mg last Thursday. Has been waking up at times with increased anxiety. Today was the worst. Took an additional 0.5 mg Ativan to manage symptoms. Is feeling better right now.     Plan  - Decrease back to 12.5 mg Zoloft and continue until next visit in 2 weeks. Will likely try increase to 25 mg at that time.    =============================================================================  Agnes Shepherd M.D.  PGY-3 Psychiatry Resident

## 2017-05-16 ENCOUNTER — OFFICE VISIT (OUTPATIENT)
Dept: PSYCHIATRY | Facility: CLINIC | Age: 27
End: 2017-05-16
Attending: PSYCHIATRY & NEUROLOGY
Payer: COMMERCIAL

## 2017-05-16 VITALS — HEART RATE: 65 BPM | DIASTOLIC BLOOD PRESSURE: 73 MMHG | WEIGHT: 197.4 LBS | SYSTOLIC BLOOD PRESSURE: 112 MMHG

## 2017-05-16 DIAGNOSIS — F41.1 GAD (GENERALIZED ANXIETY DISORDER): ICD-10-CM

## 2017-05-16 PROCEDURE — 99212 OFFICE O/P EST SF 10 MIN: CPT | Mod: ZF

## 2017-05-16 RX ORDER — LORAZEPAM 1 MG/1
1 TABLET ORAL 2 TIMES DAILY
Qty: 60 TABLET | Refills: 1 | Status: SHIPPED | OUTPATIENT
Start: 2017-05-16 | End: 2017-06-20

## 2017-05-16 RX ORDER — SERTRALINE HYDROCHLORIDE 25 MG/1
25 TABLET, FILM COATED ORAL DAILY
Qty: 30 TABLET | Refills: 1 | Status: SHIPPED | OUTPATIENT
Start: 2017-05-16 | End: 2017-06-20
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
## 2017-05-16 NOTE — NURSING NOTE
.  Chief Complaint   Patient presents with     Recheck Medication     WILLIS    Reviewed allergies, smoking status, and pharmacy preference  Administered abuse screening questions   Obtained weight, blood pressure and heart rate

## 2017-05-16 NOTE — MR AVS SNAPSHOT
After Visit Summary   5/16/2017    Hal Woo    MRN: [id number]           Patient Information     Date Of Birth          1990        Visit Information        Provider Department      5/16/2017 8:40 AM Agnes Shepherd MD Psychiatry Clinic        Today's Diagnoses     WILLIS (generalized anxiety disorder)          Care Instructions    - Increase Zoloft to 25 mg daily    Thank you for coming to the PSYCHIATRY CLINIC.    Lab Testing:  **If you had lab testing today and your results are reassuring or normal they will be mailed to you or sent through [employer] within 7 days.   **If the lab tests need quick action we will call you with the results.  The phone number we will call with results is # [phone] (home) . If this is not the best number please call our clinic and change the number.    Medication Refills:  If you need any refills please call your pharmacy and they will contact us. Please allow three business for refill processing.   If you need to  your refill at a new pharmacy, please contact the new pharmacy directly. The new pharmacy will help you get your medications transferred.     Scheduling:  If you have any concerns about today's visit or wish to schedule another appointment please call our office during normal business hours [phone] (8-5:00 M-F)    Contact Us:  Please call [phone] during business hours (8-5:00 M-F).  If after clinic hours, or on the weekend, please call  [phone].      MENTAL HEALTH CRISIS NUMBERS:  [facility]:   [facility] - [phone]   Crisis Residence [facility] - [phone]   Walk-In Counseling [facility] - [phone]   COPE 24/7 [place] Mobile Team for Adults - [[phone]]; Child - [[phone]]     Crisis Connection - [phone]     [facility]:   [facility] - [phone]   Walk-in counseling [facility] - [phone]   Walk-in counseling   [facility] - [phone]   Crisis Residence [facility] - [phone]   Urgent Care Adult Mental Health:   --Drop-in, 24/7 crisis line, and Dannie Obrien Mobile Team [[phone]]    CRISIS TEXT LINE: Text [phone] from anywhere, anytime, any crisis 24/7;    OR SEE www.crisistextline.org     Poison Control Center - [phone]    CHILD: [facility] needs assessment team - [phone]     [facility] Lifeline - [phone]; or [facility] Lifeline - [phone]    If you have a medical emergency please call 911or go to the nearest ER.                    _____________________________________________    Again thank you for choosing PSYCHIATRY CLINIC and please let us know how we can best partner with you to improve you and your family's health.  You may be receiving a survey in the mail regarding this appointment. We would love to have your feedback, both positive and negative, so please fill out the survey and return it using the provided envolpe. The survey is done by an external company, so your answers are anonymous.           Follow-ups after your visit        Follow-up notes from your care team     Return in about 5 weeks (around 6/20/2017).      Your next 10 appointments already scheduled     Jun 20, 2017  7:55 AM CDT   Adult Med Follow UP with Agnes Shepherd MD   Psychiatry Clinic ([facility])    [address] [facility] [id number] [phone]              Who to contact     Please call your clinic at [phone] to:    Ask questions about your health    Make or cancel appointments    Discuss your medicines    Learn about your test results    Speak to your doctor   If you have compliments or concerns about an experience at your clinic, or if you wish to file a complaint, please contact [facility] Patient Relations at [phone] or email us at [email or web address]          Additional Information About Your Visit        [employer] Information     [employer] gives you secure access to your electronic health record. If you see a primary care provider, you can also send messages to your care team and make appointments. If you have questions, please call your primary care clinic.  If you do not have a primary care provider, please call [phone] and they will assist you.      [employer] is an electronic gateway that provides easy, online access to your medical records. With [employer], you can request a clinic appointment, read your test results, renew a prescription or communicate with your care team.     To access your existing account, please contact your [facility] or call [phone] for assistance.        Care EveryWhere ID     This is your Care EveryWhere ID. This could be used by other organizations to access your [facility] medical records  [id number]        Your Vitals Were     Pulse                   65            Blood Pressure from Last 3 Encounters:   05/16/17 112/73   04/18/17 113/76   03/24/17 114/79    Weight from Last 3 Encounters:   05/16/17 89.5 kg (197 lb 6.4 oz)   04/18/17 91.5 kg (201 lb 12.8 oz)   03/24/17 88.5 kg (195 lb)              Today, you had the following     No orders found for display         Today's Medication Changes          These changes are accurate as of: 5/16/17  9:20 AM.  If you have any questions, ask your nurse or doctor.               These medicines have changed or have updated prescriptions.        Dose/Directions    sertraline 25 MG tablet   Commonly known as:  ZOLOFT   This may have changed:    - how much to take  - how to take this  - when to take this  - additional instructions   Used for:  WILLIS (generalized anxiety disorder)   Changed by:  Agnes Shepherd MD        Dose:  25 mg   Take 1 tablet (25 mg) by mouth daily   Quantity:  30 tablet   Refills:  1            Where to get your medicines      These  medications were sent to [facility][id number] - [place], [place] - [address]  [address], [place] [address]     Phone:  [phone]     sertraline 25 MG tablet         Some of these will need a paper prescription and others can be bought over the counter.  Ask your nurse if you have questions.     Bring a paper prescription for each of these medications     LORazepam 1 MG tablet                Primary Care Provider Office Phone # Fax #    Brittany Penaloza [phone]       [facility] [address] [facility] [address]  [facility] [address]        Thank you!     Thank you for choosing PSYCHIATRY CLINIC  for your care. Our goal is always to provide you with excellent care. Hearing back from our patients is one way we can continue to improve our services. Please take a few minutes to complete the written survey that you may receive in the mail after your visit with us. Thank you!             Your Updated Medication List - Protect others around you: Learn how to safely use, store and throw away your medicines at www.disposemymeds.org.          This list is accurate as of: 5/16/17  9:20 AM.  Always use your most recent med list.                   Brand Name Dispense Instructions for use    cetirizine 10 MG tablet    zyrTEC     Take 10 mg by mouth       LORazepam 1 MG tablet    ATIVAN    60 tablet    Take 1 tablet (1 mg) by mouth 2 times daily       sertraline 25 MG tablet    ZOLOFT    30 tablet    Take 1 tablet (25 mg) by mouth daily       triamcinolone 0.1 % ointment    KENALOG    80 g    Apply topically 2 times daily Avoid face, underarms, groin       VITAMIN D PO      Take by mouth daily

## 2017-05-17 ASSESSMENT — PATIENT HEALTH QUESTIONNAIRE - PHQ9: SUM OF ALL RESPONSES TO PHQ QUESTIONS 1-9: 11

## 2017-06-20 ENCOUNTER — TELEPHONE (OUTPATIENT)
Dept: PSYCHIATRY | Facility: CLINIC | Age: 27
End: 2017-06-20

## 2017-06-20 ENCOUNTER — OFFICE VISIT (OUTPATIENT)
Dept: PSYCHIATRY | Facility: CLINIC | Age: 27
End: 2017-06-20
Attending: PSYCHIATRY & NEUROLOGY
Payer: COMMERCIAL

## 2017-06-20 VITALS — WEIGHT: 198.8 LBS | HEART RATE: 68 BPM | SYSTOLIC BLOOD PRESSURE: 114 MMHG | DIASTOLIC BLOOD PRESSURE: 73 MMHG

## 2017-06-20 DIAGNOSIS — F41.1 GAD (GENERALIZED ANXIETY DISORDER): ICD-10-CM

## 2017-06-20 PROCEDURE — 99212 OFFICE O/P EST SF 10 MIN: CPT | Mod: ZF

## 2017-06-20 RX ORDER — LORAZEPAM 1 MG/1
1 TABLET ORAL 2 TIMES DAILY
Qty: 60 TABLET | Refills: 0 | Status: SHIPPED | OUTPATIENT
Start: 2017-07-14 | End: 2017-08-08

## 2017-06-20 RX ORDER — SERTRALINE HYDROCHLORIDE 25 MG/1
TABLET, FILM COATED ORAL
Qty: 50 TABLET | Refills: 0 | Status: SHIPPED | OUTPATIENT
Start: 2017-06-20 | End: 2017-07-11

## 2017-06-20 NOTE — PATIENT INSTRUCTIONS
- Increase sertraline to 37.5 mg. You can increase to 50 mg after 2-3 weeks as tolerated.    Thank you for coming to the PSYCHIATRY CLINIC.    Lab Testing:  If you had lab testing today and your results are reassuring or normal they will be mailed to you or sent through [employer] within 7 days.   If the lab tests need quick action we will call you with the results.  The phone number we will call with results is # [phone] (home) . If this is not the best number please call our clinic and change the number.    Medication Refills:  If you need any refills please call your pharmacy and they will contact us. Our fax number for refills is [phone]. Please allow three business for refill processing.   If you need to  your refill at a new pharmacy, please contact the new pharmacy directly. The new pharmacy will help you get your medications transferred.     Scheduling:  If you have any concerns about today's visit or wish to schedule another appointment please call our office during normal business hours [phone] (8-5:00 M-F)    Contact Us:  Please call [phone] during business hours (8-5:00 M-F).  If after clinic hours, or on the weekend, please call  [phone].    Financial Assistance [phone]  [facility] Billing [phone]  [facility] Billing [phone]  Medical Records [phone]      MENTAL HEALTH CRISIS NUMBERS:  [facility]:   [facility] - [phone]   Crisis Residence [facility] - [phone]   Walk-In Counseling [facility] [phone]   COPE 24/7 [place] Mobile Team for Adults - [[phone]]; Child - [[phone]]     Crisis Connection - [phone]     [facility]:   [facility] - [phone]   Walk-in counseling [facility] - [phone]   Walk-in counseling [facility] - [phone]   Crisis Residence [facility] - [phone]   Urgent Care Adult Mental  Health:   --Drop-in, 24/7 crisis line, and Dannie Obrien Mobile Team [[phone]]    CRISIS TEXT LINE: Text [phone] from anywhere, anytime, any crisis 24/7;    OR SEE www.crisistextline.org     Poison Control Center - [phone]    CHILD: [facility] needs assessment team - [phone]     [facility] Lifeline - [phone]; or [facility] Lif[facility] - [phone]    If you have a medical emergency please call 911or go to the nearest ER.                    _____________________________________________    Again thank you for choosing PSYCHIATRY CLINIC and please let us know how we can best partner with you to improve you and your family's health.  You may be receiving a survey in the mail regarding this appointment. We would love to have your feedback, both positive and negative, so please fill out the survey and return it using the provided envolpe. The survey is done by an external company, so your answers are anonymous.

## 2017-06-20 NOTE — TELEPHONE ENCOUNTER
"Last appt: today    Returned call to pharmacy and spoke with Maya.  Maya reports that the pharmacy has already \"solved\" the prescription issue so no further action is needed.  She reports that they ran the 25 mg tabs for a shorter day supply and do not need a new prescription at this time.    Routed to provider as FYI.  "

## 2017-06-20 NOTE — PROGRESS NOTES
PSYCHIATRY CLINIC PROGRESS NOTE   The initial DIAG EVAL was 2/13/17.      INTERIM HISTORY                                                 PSYCH CRITICAL ITEM HISTORY includes suicidal ideation and CD: EtOH, THC, acid x1. Mental health issues were first experienced in childhood and he first received mental health care through therapist in grade school.      Hal Woo is a 27 year old male who was last seen in clinic on 5/16/17 at which time sertraline increased. The patient reports good treatment adherence.  History was provided by patient who was a good historian.  Since the last visit:   - No side effects with increase to 25 mg of sertraline. Was planning to increase to 37.5 mg a few weekends ago but had a lot of things going and then last weekend was sick. Plans to increase this weekend.    - Hasn't had a drink in about 5 weeks. Didn't like the interaction with Ativan.   - Started class last week - [employer].   - Has to move due to financial stress. Will move when lease is up at the end of September.   - No panic attacks recently. Continues to do things he wouldn't have done in the past. A few weeks ago biked to a concert and went alone.   - Was nervous about going to wedding but it ended up going well.   - Has to present in class and that has been going okay.     RECENT SYMPTOMS:   PANIC ATTACK:  None since last visit  ANXIETY:  excessive worry, obsessions [worrying about possibility of hurting self or others], nervous/tense, fidgety/restless and overwhelmed. Improvement in fear of leaving home.   DEPRESSION:  Some days of: depressed mood, insomnia, low energy, poor concentration /memory, suicidal ideation and overwhelmed;  DENIES- hypersomnia, SI  EATING DISORDER: none    RECENT SUBSTANCE USE:     ALCOHOL- none current  TOBACCO- none   CAFFEINE- limits use now 2/2 anxiety   OPIOIDS- none / NARCAN KIT-  No    CANNABIS- none  OTHER ILLICIT DRUGS- none     CURRENT SOCIAL  HISTORY:    Financial Support- working. Living Situation- lives alone. Children- none. Feels Safe at Home- Yes.     MEDICAL ROS:  Reports none.  Denies any physical health symptoms of concern..    PSYCH and CD Critical Summary Points since July 2016           - Feb 2017: Tried hydroxyzine. Started Ativan  - March 2017: Started mirtazapine  - April 2017: Discontinued mirtazapine, started sertraline.   - May 2017: Increased sertraline    PAST PSYCH MED TRIALS                                  see EMR Problem List: Hx of psychiatric care    MEDICAL / SURGICAL HISTORY                                   CARE TEAM:          PCP- Dr. Brittany Penaloza                   Therapist- Sofía Ramirez, MSW MPH LICSW    Patient Active Problem List   Diagnosis     Anxiety state     Depression     Contact dermatitis     Disturbance in sleep behavior     Generalized anxiety disorder     Insomnia     Panic disorder     Panic disorder with agoraphobia     Vocal cord dysfunction     Vitamin D deficiency     ALLERGY                                Pineapple and Seasonal allergies  MEDICATIONS                               Current Outpatient Prescriptions   Medication Sig Dispense Refill     sertraline (ZOLOFT) 25 MG tablet Take 1 tablet (25 mg) by mouth daily 30 tablet 1     LORazepam (ATIVAN) 1 MG tablet Take 1 tablet (1 mg) by mouth 2 times daily 60 tablet 1     cetirizine (ZYRTEC) 10 MG tablet Take 10 mg by mouth       triamcinolone (KENALOG) 0.1 % ointment Apply topically 2 times daily Avoid face, underarms, groin 80 g 3     Cholecalciferol (VITAMIN D PO) Take by mouth daily          VITALS   /73  Pulse 68  Wt 90.2 kg (198 lb 12.8 oz)   MENTAL STATUS EXAM                                                             Alertness: alert  and oriented  Appearance: adequately groomed and appearance was notable for dressed appropriately for weather in brightly colored clothing including stocking hat.  Behavior/Demeanor: cooperative, with  "fair  eye contact   Speech: regular rate and rhythm  Language: intact  Psychomotor: fidgety  Mood:  anxious  Affect: full range; was congruent to mood; was congruent to content  Thought Process/Associations: unremarkable    Thought Content: Denies suicidal ideation    Perception:  Denies no evidence of AH/VH does not appear to be responding to internal stimuli;    Insight: fair  Judgment: fair  Cognition:  does  appear grossly intact; formal cognitive testing was not done    LABS and DATA     PHQ9 TODAY = 9  No flowsheet data found.      DIAGNOSIS     WILLIS with agoraphobia  R/o MDD     ASSESSMENT                                     Pertinent Background:  Pt notes mood symptoms that started in childhood. He saw a therapist at a young age (K-5th grade) for depression. A low level of anxiety has been present as well, first noticed an increase in anxiety around May 2011 when he graduated from college and moved in with his parents for 2 months in the fall of that year. October 2011 when he experienced his first \"panic attack\", while at work, in the context of overuse of caffeine to help aid a hangover from a previous night of drinking EtOH. Since 2011 he discusses having a high amount of \"fear and worry\" which is prominent when leaving his home and exacerbated by feeling like he is trapped and does not have an escape route. Has hx of passive SI no hx of attempts or plans. Experienced side effects to every medication tried aside from mirtazapine which was discontinued 2/2 weight gain.      TODAY: Subhash increased sertraline to 25 mg following last visit and did not experience any side effects during that transition. This is the longest he has ever been on an SSRI which he is hopeful about. Will plan to increase to 37.5 mg this weekend and to 50 mg over 2-3 weeks. Has noticed improvement in symptoms as he's been able to do things that would have been potentially difficult or impossible in the past.    Would recommend " continuing to increase sertraline slowly as this is how he's been tolerating it. Similarly would recommend a very slow titration off Ativan when it is decided it is appropriate to do so given how sensitive he is to changes. He is aware that the use of Ativan is a short term solution.     SUICIDE RISK ASSESSMENT:  Today Hal Woo denies SI. In addition, he has notable risk factors for self-harm, including anxiety, insomnia, worsening symptoms of depression and singe status. However, risk is mitigated by no h/o suicide attempt, no h/o SIB, no h/o risky impulsive behavior, commitment to family, stable job, stable housing, h/o seeking help when needed and future oriented. Therefore, based on all available evidence including the factors cited above, he does not appear to be at imminent risk for self-harm, does not meet criteria for a 72-hr hold, and therefore involuntary hospitalization will not be pursued at this  time. However, based on degree of symptoms, voluntary referral for frequent clinic visits and med changes was recommended. He accepted this offer.    CONTROLLED SUBSTANCE STATEMENT:  The use of Lorazepam (Ativan) is indicated and appropriate.  This regimen is both beneficial and well tolerated with no adverse effects or tolerance.  There is no evidence of abuse of this medication or other substances.  Warnings related to abuse potential, street value, adverse effects, abrupt cessation, withdrawal and need for emergent care have been discussed and are understood by the patient.  The patient has verbalized clear understanding of safety issues as well as the need to control use.  Plan to continue use at this time.   Controlled Substance Contract was not completed.       PSYCHOTROPIC DRUG INTERACTIONS: None  MANAGEMENT:  N/A     PLAN                                                                                                       1) PSYCHOTROPIC MEDICATIONS:   - Continue lorazepam 1 mg BID (1 Rx faxed  to pharmacy)   - Increase sertraline to 37.5 mg for 2 weeks then increase to 50 mg daily    2) THERAPY:  Continue    3) LABS NEXT DUE:  none       RATING SCALES:  none needed    4) REFERRALS [CD, medical, other]:  none    5) :  none    6) RTC: 4 weeks with Dr. Rivera    7) CRISIS NUMBERS:   Provided routinely in AVS    TREATMENT RISK STATEMENT:  The risks, benefits, alternatives and potential adverse effects have been discussed and are understood by the patient/ patient's guardian. The pt understands the risks of using street drugs or alcohol.  There are no medical contraindications, the pt agrees to treatment with the ability to do so.  The patient understands to call 911 or come to the nearest ED if life threatening or urgent symptoms present.     RESIDENT:   Agnes Shepherd MD    Patient not staffed in clinic. Supervisor Dr. Michel who will sign note.    I did not see this patient directly. I have reviewed the documentation and I agree with the resident's plan of care.     Alesia Michel MD

## 2017-06-20 NOTE — MR AVS SNAPSHOT
After Visit Summary   6/20/2017    Hal Woo    MRN: [id number]           Patient Information     Date Of Birth          1990        Visit Information        Provider Department      6/20/2017 7:55 AM Agnes Shepherd MD Psychiatry Clinic        Today's Diagnoses     WILLIS (generalized anxiety disorder)          Care Instructions    - Increase sertraline to 37.5 mg. You can increase to 50 mg after 2-3 weeks as tolerated.    Thank you for coming to the PSYCHIATRY CLINIC.    Lab Testing:  If you had lab testing today and your results are reassuring or normal they will be mailed to you or sent through [email or web address] within 7 days.   If the lab tests need quick action we will call you with the results.  The phone number we will call with results is # [phone] (home) . If this is not the best number please call our clinic and change the number.    Medication Refills:  If you need any refills please call your pharmacy and they will contact us. Our fax number for refills is [phone]. Please allow three business for refill processing.   If you need to  your refill at a new pharmacy, please contact the new pharmacy directly. The new pharmacy will help you get your medications transferred.     Scheduling:  If you have any concerns about today's visit or wish to schedule another appointment please call our office during normal business hours [phone] (8-5:00 M-F)    Contact Us:  Please call [phone] during business hours (8-5:00 M-F).  If after clinic hours, or on the weekend, please call  [phone].    Financial Assistance [phone]  [employer] Billing [phone]  [facility] Billing [phone]  Medical Records [phone]      MENTAL HEALTH CRISIS NUMBERS:  [facility]:   [facility] - [phone]   Crisis Residence [facility] - [phone]   Walk-In Counseling Center [facility] - [phone]   COPE 24/7 [facility] Team for Adults -  [[phone]]; Child - [[phone]]     Crisis Connection - [phone]     [facility] - [phone]   Walk-in counseling [facility] - [phone]   Walk-in counseling [facility] - [phone]   Crisis Residence [facility] - [phone]   Urgent Care Adult Mental Health:   --Drop-in, 24/7 crisis line, and [employer] Mobile Team [[phone]]    CRISIS TEXT LINE: Text [phone] from anywhere, anytime, any crisis 24/7;    OR SEE www.crisistextline.org     Poison Control Center - [phone]    CHILD: [facility] needs assessment team - [phone]     [facility] Lifeline - [phone]; or [facility] Lifeline - [phone]    If you have a medical emergency please call 911or go to the nearest ER.                    _____________________________________________    Again thank you for choosing PSYCHIATRY CLINIC and please let us know how we can best partner with you to improve you and your family's health.  You may be receiving a survey in the mail regarding this appointment. We would love to have your feedback, both positive and negative, so please fill out the survey and return it using the provided envolpe. The survey is done by an external company, so your answers are anonymous.             Follow-ups after your visit        Follow-up notes from your care team     Return in about 3 weeks (around 7/11/2017) for 30 MFU with Dr. Rivera.      Your next 10 appointments already scheduled     Jul 11, 2017  8:25 AM CDT   Adult Med Follow UP with Agnes Shepherd MD   Psychiatry Clinic ([facility])    [address] [facility] [address] [phone]              Who to contact     Please call your clinic at [phone] to:    Ask questions about your health    Make or cancel appointments    Discuss your medicines    Learn about your test results    Speak to  your doctor   If you have compliments or concerns about an experience at your clinic, or if you wish to file a complaint, please contact [facility] Patient Relations at [phone] or email us at [email or web address]         Additional Information About Your Visit        MyChart Information     [employer] gives you secure access to your electronic health record. If you see a primary care provider, you can also send messages to your care team and make appointments. If you have questions, please call your primary care clinic.  If you do not have a primary care provider, please call [phone] and they will assist you.      [employer] is an electronic gateway that provides easy, online access to your medical records. With [employer], you can request a clinic appointment, read your test results, renew a prescription or communicate with your care team.     To access your existing account, please contact your [facility] or call [phone] for assistance.        Care EveryWhere ID     This is your Care EveryWhere ID. This could be used by other organizations to access your [place] medical records  [id number]        Your Vitals Were     Pulse                   68            Blood Pressure from Last 3 Encounters:   06/20/17 114/73   05/16/17 112/73   04/18/17 113/76    Weight from Last 3 Encounters:   06/20/17 90.2 kg (198 lb 12.8 oz)   05/16/17 89.5 kg (197 lb 6.4 oz)   04/18/17 91.5 kg (201 lb 12.8 oz)              Today, you had the following     No orders found for display         Today's Medication Changes          These changes are accurate as of: 6/20/17  8:40 AM.  If you have any questions, ask your nurse or doctor.               These medicines have changed or have updated prescriptions.        Dose/Directions    sertraline 25 MG tablet   Commonly known as:  ZOLOFT   This may have changed:    - how much to take  - how to take this  - when to take  this  - additional instructions   Used for:  WILLIS (generalized anxiety disorder)   Changed by:  Agnes Shepherd MD        Take 1.5 tabs (37.5 mg) for 2 weeks then increase to 2 tabs (50 mg) daily   Quantity:  50 tablet   Refills:  0            Where to get your medicines      These medications were sent to [id number] - [place], [place] - [address] [place] [address]     Phone:  [phone]     sertraline 25 MG tablet         Some of these will need a paper prescription and others can be bought over the counter.  Ask your nurse if you have questions.     Bring a paper prescription for each of these medications     LORazepam 1 MG tablet                Primary Care Provider Office Phone # Fax #    Brittany Penaloza [phone]       [facility] [address] [place] [address]        Thank you!     Thank you for choosing PSYCHIATRY CLINIC  for your care. Our goal is always to provide you with excellent care. Hearing back from our patients is one way we can continue to improve our services. Please take a few minutes to complete the written survey that you may receive in the mail after your visit with us. Thank you!             Your Updated Medication List - Protect others around you: Learn how to safely use, store and throw away your medicines at www.disposemymeds.org.          This list is accurate as of: 6/20/17  8:40 AM.  Always use your most recent med list.                   Brand Name Dispense Instructions for use    cetirizine 10 MG tablet    zyrTEC     Take 10 mg by mouth       LORazepam 1 MG tablet   Start taking on:  7/14/2017    ATIVAN    60 tablet    Take 1 tablet (1 mg) by mouth 2 times daily       sertraline 25 MG tablet    ZOLOFT    50 tablet    Take 1.5 tabs (37.5 mg) for 2 weeks then increase to 2 tabs (50 mg) daily       triamcinolone 0.1 % ointment    KENALOG    80 g    Apply topically 2 times daily Avoid  face, underarms, groin       VITAMIN D PO      Take by mouth daily

## 2017-06-20 NOTE — TELEPHONE ENCOUNTER
----- Message from Cari Aguirre sent at 6/20/2017  8:43 AM CDT -----  Regarding: pharmacy calling  Cora/Alix Mccartney from [facility] pharmacy is caller. She is calling about the sertraline. She says that the insurance will not cover it as it is written. They will only pay for 1 tab a day.     [phone]

## 2017-06-21 ENCOUNTER — TELEPHONE (OUTPATIENT)
Dept: PSYCHIATRY | Facility: CLINIC | Age: 27
End: 2017-06-21

## 2017-06-21 NOTE — TELEPHONE ENCOUNTER
A prescription ordered by Dr. Shepherd  for Ativan was  faxed to [facility] at  [phone] on 6/20/2017  per fax cover sheet.  Original placed back in provider's folder Danay Martinez/PHAM

## 2017-06-27 ASSESSMENT — PATIENT HEALTH QUESTIONNAIRE - PHQ9: SUM OF ALL RESPONSES TO PHQ QUESTIONS 1-9: 9

## 2017-07-11 ENCOUNTER — OFFICE VISIT (OUTPATIENT)
Dept: PSYCHIATRY | Facility: CLINIC | Age: 27
End: 2017-07-11
Attending: PSYCHIATRY & NEUROLOGY
Payer: COMMERCIAL

## 2017-07-11 VITALS — WEIGHT: 199.4 LBS | HEART RATE: 75 BPM | SYSTOLIC BLOOD PRESSURE: 114 MMHG | DIASTOLIC BLOOD PRESSURE: 73 MMHG

## 2017-07-11 DIAGNOSIS — Z92.89 HX OF PSYCHIATRIC CARE: ICD-10-CM

## 2017-07-11 DIAGNOSIS — F41.1 GAD (GENERALIZED ANXIETY DISORDER): ICD-10-CM

## 2017-07-11 PROCEDURE — 99212 OFFICE O/P EST SF 10 MIN: CPT | Mod: ZF

## 2017-07-11 RX ORDER — SERTRALINE HYDROCHLORIDE 25 MG/1
TABLET, FILM COATED ORAL
Qty: 35 TABLET | Refills: 0 | Status: SHIPPED | OUTPATIENT
Start: 2017-07-11 | End: 2017-08-08

## 2017-07-11 NOTE — PROGRESS NOTES
"  PSYCHIATRY CLINIC PROGRESS NOTE   The initial diagnostic evaluation was on 2/13/17 and the last transfer of care evaluation was 7/11/17.    Pertinent Background:  This patient first experienced mental health issues in childhood where he saw a therapist in grade school and has received treatment for depression and anxiety.  See transfer evaluation for detailed history.  Notably, depression present in childhood along with a low level of anxiety, first noticed an increase in anxiety around May 2011 after graduation from college and moving in with parents for 2 months in the fall of that year. Oct 2011 he had first \"panic attack\" while at work in context of overuse of caffeine to help aid a hanover from a previous night of drinking EtOH. Since 2011 has had a high amount of \"fear and worry\" which is prominent when leaving his home and exacerbated by feeling like he is trapped and does not have an escape route. Hx of passive SI. Has experienced side effects (\"brain on fire\") to every medication tried aside from mirtazapine which was stopped 2/2 weight gain.  Psych critical item history includes suicidal ideation.    INTERIM HISTORY                                                 Hal Woo is a 27 year old male who was last seen in clinic on 6/20/17 at which time he was instructed to increase sertraline to 37.5mg for 2 weeks then to 50mg.  The patient reports good treatment adherence.  History was provided by the patient who was a good historian.  Since the last visit:  - 2 days after last appointment attempted increase in sertraline to 37.5mg, after 4 days he felt more anxious, but did not have brain on fire sensation so he reduced back down to 25mg  -mood has been \"back and forth\", with school being stressful, has not been \"in the dumps\" like has been in previous months or when current provider saw him last  -no safety concerns, no passive SI even when he was not feeling well from sertraline increase  " "  -anxiety has been manageable with current regimen, is open to future increase because he does not believe anxiety is managed to a point where he is functioning optimally  -anxiety during lecture part of class, trouble breathing, a lot of concerns and worries about being loud or disruptive but once the class transitions to the discussion portion the anxiety improves  -thoughts of worrying about hurting other people have decreased  -has a new obsession about death as he discovered recently that people will have a release of their bowels upon death and so he has been thinking about when people die or when he dies that he will \"shit himself\" and this has been disturbing to him, he does not feel it impacts his ability to function throughout the day  -he has also been worried about losing his hair, he feels the hairline on the L side of his head as started to recede despite reports from others (including family and this writer) that it does not look like it has changed   -drank 2 glasses of wine on Sunday for fathers birthday, had not drank for over 7 weeks, again reviewed the risks of lorazepam use with alcohol and advised abstaining for use with medications    RECENT SYMPTOMS:   DEPRESSION:  reports-depressed mood, low energy, insomnia , poor concentration /memory and overwhelmed;  DENIES- suicidal ideation   DEVAUGHN/HYPOMANIA:  reports-none;  DENIES- increased energy, decreased sleep need, increased activity and grandiosity  PSYCHOSIS:  reports-none;  DENIES- delusions, auditory hallucinations and visual hallucinations  DYSREGULATION:  reports-none;  DENIES- suicidal ideation, violent ideation and SIB  PANIC ATTACK:  none   ANXIETY:  excessive worry and obsessions  TRAUMA RELATED:  none  COMPULSIVE:  none  SLEEP:  no significant change     EATING DISORDER: none    RECENT SUBSTANCE USE:     ALCOHOL- 2 glasses of wine recently, otherwise has not drank in 7 weeks          TOBACCO- none          CAFFEINE- limited intake " 2/2 anxiety             CANNABIS- none  OPIOIDS- none            NARCAN KIT- N/A     OTHER ILLICIT DRUGS- none     CURRENT SOCIAL HISTORY:  FINANCIAL SUPPORT- working     CHILDREN- none     LIVING SITUATION- lives alone in apartment    SOCIAL/ SPIRITUAL SUPPORT- friends, family, therapist    FEELS SAFE AT HOME- Yes     MEDICAL ROS:  Reports sexual dysfunction, restlessness and short term memory and/or word finding difficulty and slowed reaction time.  Denies GI [nausea, diarrhea, constipation], weight changes [increase, decrease], headache, sedation, tremor, confusion, excessive diaphoresis, muscle twitches, bruxism, shiver and easy bruising  and withdrawal symptoms.    PSYCH and CD Critical Summary Points since July 2017           None    PAST PSYCH MED TRIALS   see EMR Problem List: Hx of psychiatric care    MEDICAL / SURGICAL HISTORY                                   CARE TEAM:    PCP- Dr. Brittany Penaloza            Therapist- Sofía Ramirez MSW MPH LICSW    Patient Active Problem List   Diagnosis     Anxiety state     Depression     Contact dermatitis     Disturbance in sleep behavior     Generalized anxiety disorder     Insomnia     Panic disorder     Panic disorder with agoraphobia     Vocal cord dysfunction     Vitamin D deficiency       ALLERGY                                Pineapple and Seasonal allergies  MEDICATIONS                               Current Outpatient Prescriptions   Medication Sig Dispense Refill     sertraline (ZOLOFT) 25 MG tablet Take 1.0 tabs (25 mg) for 2 weeks then increase to 1.5 tabs (37.5 mg) daily 35 tablet 0     [START ON 7/14/2017] LORazepam (ATIVAN) 1 MG tablet Take 1 tablet (1 mg) by mouth 2 times daily 60 tablet 0     cetirizine (ZYRTEC) 10 MG tablet Take 10 mg by mouth       triamcinolone (KENALOG) 0.1 % ointment Apply topically 2 times daily Avoid face, underarms, groin 80 g 3     Cholecalciferol (VITAMIN D PO) Take by mouth daily       VITALS   /73  Pulse 75  Wt  90.4 kg (199 lb 6.4 oz)   MENTAL STATUS EXAM                                                           Alertness: alert  and oriented  Appearance: well groomed in shorts and tshirt with hair pulled back  Behavior/Demeanor: cooperative and pleasant, with fair  eye contact   Speech: normal  Language: intact  Psychomotor: fidgety  Mood: anxious  Affect: full range; was congruent to mood; was congruent to content  Thought Process/Associations: unremarkable  Thought Content:  Reports none;  Denies suicidal ideation , violent ideation  and psychotic thoughts   Perception:  Reports none;  Denies auditory hallucinations and visual hallucinations  Insight: fair  Judgment: adequate for safety  Cognition: does  appear grossly intact; formal cognitive testing was not done    LABS and DATA     RATING SCALES:  none    PHQ9 TODAY = 9  PHQ-9 SCORE 5/16/2017 6/20/2017 7/11/2017   Total Score 11 9 9     DIAGNOSIS     WILLIS with agoraphobia  R/O MDD    ASSESSMENT                                     TODAY Subhash reports that he continues to be able to do more activities than he had previously and is doing much better in regards to mood than he was when writer last saw him at his initial appointment. He was not able to tolerate the increase in sertraline as schedule at last appointment but is hopeful that trying the increase a second time may go well since this was the case when he initially went from 12.5 to 25mg. He is not wanting to try to increase his medication again until he completes his class, which ends in 2 weeks. Will plan to increase from 25 to 37.5mg in 2 weeks and then follow up in 4 weeks to continue the titration if tolerating. Again discussed the plan to taper off of lorazepam once he is on a therapeutic dose of sertraline as the use of lorazepam is a short term solution with goal being management of anxiety with SSRI. He expressed understanding.              SUICIDE RISK ASSESSMENT:  Today Hal OCAMPO Luis Fernandodouglas denies SI. In  addition, he has notable risk factors for self-harm, including anxiety, insomnia, worsening symptoms of depression and singe status. However, risk is mitigated by no h/o suicide attempt, no h/o SIB, no h/o risky impulsive behavior, commitment to family, stable job, stable housing, h/o seeking help when needed and future oriented. Therefore, based on all available evidence including the factors cited above, he does not appear to be at imminent risk for self-harm, does not meet criteria for a 72-hr hold, and therefore involuntary hospitalization will not be pursued at this time. However, based on degree of symptoms, voluntary referral for frequent clinic visits and med changes was recommended. He accepted this offer.    CONTROLLED SUBSTANCE STATEMENT:  The use of Lorazepam (Ativan) is indicated and appropriate.  This regimen is both beneficial and well tolerated with no adverse effects or tolerance.  There is no evidence of abuse of this medication or other substances.  Warnings related to abuse potential, street value, adverse effects, abrupt cessation, withdrawal and need for emergent care have been discussed and are understood by the patient.  The patient has verbalized clear understanding of safety issues as well as the need to control use.  Plan to continue use at this time.   Controlled Substance Contract was not completed.    [place] PRESCRIPTION MONITORING PROGRAM [] was not checked today:  will be checked next visit, no suspected abuse of lorazepam, no hx of substance abuse    PSYCHOTROPIC DRUG INTERACTIONS:   None.  MANAGEMENT:  N/A     PLAN                                                                                                       1) PSYCHOTROPIC MEDICATIONS:  - continue sertraline 25mg daily for 2 weeks, then increase to 37.5mg daily  - continue lorazepam 1mg BID (no script needed today)    2) THERAPY:  Continue    3) NEXT DUE:    Labs- N/A  Rating Scales- N/A    4) REFERRALS:    NONE    5) RTC: 4  weeks    6) CRISIS NUMBERS:   Provided routinely in AVS.  Especially emphasized:  ONLY if a [facility] PT: [facility] [place] [facility] [phone] (clinic), [phone] (after hours)     RESIDENT:   She Rivera, DO    Patient not staffed in clinic.  Note will be reviewed and signed by supervisor Dr. Michel.    TREATMENT RISK STATEMENT:  The risks, benefits, alternatives and potential adverse effects have been discussed and are understood by the patient/ patient's guardian. The pt understands the risks of using street drugs or alcohol.  There are no medical contraindications, the pt agrees to treatment with the ability to do so.  The patient understands to call 911 or come to the nearest ED if life threatening or urgent symptoms present.  I did not see this patient directly. I have reviewed the documentation and I agree with the resident's plan of care.     Alesia Michel MD

## 2017-07-11 NOTE — NURSING NOTE
Chief Complaint   Patient presents with     Recheck Medication     WILLIS    Reviewed allergies, smoking status, and pharmacy preference    Obtained weight, blood pressure and heart rate

## 2017-07-11 NOTE — PATIENT INSTRUCTIONS
-continue sertraline 25mg daily for 2 weeks, then increase to 37.5mg daily  -continue lorazepam 1mg twice a day  -return to clinic in 4 weeks or sooner if day  Thank you for coming to the PSYCHIATRY CLINIC.    Lab Testing:  If you had lab testing today and your results are reassuring or normal they will be mailed to you or sent through [employer] within 7 days.   If the lab tests need quick action we will call you with the results.  The phone number we will call with results is # [phone] (home) . If this is not the best number please call our clinic and change the number.    Medication Refills:  If you need any refills please call your pharmacy and they will contact us. Our fax number for refills is [phone]. Please allow three business for refill processing.   If you need to  your refill at a new pharmacy, please contact the new pharmacy directly. The new pharmacy will help you get your medications transferred.     Scheduling:  If you have any concerns about today's visit or wish to schedule another appointment please call our office during normal business hours [phone] (8-5:00 M-F)    Contact Us:  Please call [phone] during business hours (8-5:00 M-F).  If after clinic hours, or on the weekend, please call  [phone].    Financial Assistance [phone]  [facility] Billing [phone]  [facility] Billing [phone]  Medical Records [phone]      MENTAL HEALTH CRISIS NUMBERS:  [facility]:   [facility] - [phone]   Crisis Residence [facility] - [phone]   Walk-In Counseling [facility] - [phone]   COPE 24/7 [facility] Mobile Team for Adults - [[phone]]; Child - [[phone]]     Crisis Connection - [phone]     [facility]:   [facility] - [phone]   Walk-in counseling [facility] - [phone]   Walk-in counseling [facility] - [phone]   Crisis Residence [facility]  [facility] - [phone]   Urgent Care Adult Mental Health:   --Drop-in, 24/7 crisis line, and [facility] Mobile Team [[phone]]    CRISIS TEXT LINE: Text [phone] from anywhere, anytime, any crisis 24/7;    OR SEE www.crisistextline.org     Poison Control Center - [phone]    CHILD: [facility] needs assessment team - [phone]     [facility] - [phone]; or [facility] - [phone]    If you have a medical emergency please call 911or go to the nearest ER.                    _____________________________________________    Again thank you for choosing PSYCHIATRY CLINIC and please let us know how we can best partner with you to improve you and your family's health.  You may be receiving a survey in the mail regarding this appointment. We would love to have your feedback, both positive and negative, so please fill out the survey and return it using the provided envolpe. The survey is done by an external company, so your answers are anonymous.

## 2017-07-11 NOTE — MR AVS SNAPSHOT
After Visit Summary   7/11/2017    Hal Woo    MRN: [id number]           Patient Information     Date Of Birth          1990        Visit Information        Provider Department      7/11/2017 9:05 AM She Rivera MD [facility]iatry [facility]        Care Instructions    -continue sertraline 25mg daily for 2 weeks, then increase to 37.5mg daily  -continue lorazepam 1mg twice a day  -return to clinic in 4 weeks or sooner if neeed  Thank you for coming to the PSYCH[facility]ATRY CLINIC.    Lab Testing:  If you had lab testing today and your results are reassuring or normal they will be mailed to you or sent through fflap within 7 days.   If the lab tests need quick action we will call you with the results.  The phone number we will call with results is # [phone] (home) . If this is not the best number please call our clinic and change the number.    Medication Refills:  If you need any refills please call your pharmacy and they will contact us. Our fax number for refills is [phone]. Please allow three business for refill processing.   If you need to  your refill at a new pharmacy, please contact the new pharmacy directly. The new pharmacy will help you get your medications transferred.     Scheduling:  If you have any concerns about today's visit or wish to schedule another appointment please call our office during normal business hours [phone] (8-5:00 M-F)    Contact Us:  Please call [phone] during business hours (8-5:00 M-F).  If after clinic hours, or on the weekend, please call  [phone].    Financial Assistance [phone]  [employer] Billing [phone]  [employer] Billing [phone]  Medical Records [phone]      MENTAL HEALTH CRISIS NUMBERS:  [facility]:   [facility] - [phone]   Crisis Residence [facility] - [facility] - [phone]   Walk-In Counseling Center [facility] - [phone]   COPE 24/7 [employer] Team for Adults -  [[phone]]; Child - [[phone]]     Crisis Connection - [phone]     [facility]:   [facility] - [phone]   Walk-in counseling [facility] - [phone]   Walk-in counseling [facility] - [phone]   Crisis Residence [facility] - [phone]   Urgent Care Adult Mental Health:   --Drop-in, 24/7 crisis line, and [employer] Mobile Team [[phone]]    CRISIS TEXT LINE: Text 176-430 from anywhere, anytime, any crisis 24/7;    OR SEE www.crisistextline.org     Poison Control Center - 1-678.230.2031    CHILD: [facility] needs assessment team - [phone]     [facility] Lif[facility] - 1-141.300.4780; or [employer] Lifeline - 1-504.110.2956    If you have a medical emergency please call 911or go to the nearest ER.                    _____________________________________________    Again thank you for choosing PSY[facility]ATRY CLINIC and please let us know how we can best partner with you to improve you and your family's health.  You may be receiving a survey in the mail regarding this appointment. We would love to have your feedback, both positive and negative, so please fill out the survey and return it using the provided envolpe. The survey is done by an external company, so your answers are anonymous.             Follow-ups after your visit        Follow-up notes from your care team     Return in about 4 weeks (around 8/8/2017) for 30 MFU.      Your next 10 appointments already scheduled     Aug 08, 2017  9:05 AM CDT   Adult Med Follow UP with She Rivera MD   Psychiatry Clinic ([facility])    [address] [facility] [address] [phone]              Who to contact     Please call your clinic at [phone] to:    Ask questions about your health    Make or cancel appointments    Discuss your medicines    Learn about your test results    Speak to your doctor   If you  have compliments or concerns about an experience at your clinic, or if you wish to file a complaint, please contact [facility] Patient Relations at [phone] or email us at [email or web address]         Additional Information About Your Visit        MyChart Information     "[employer]t gives you secure access to your electronic health record. If you see a primary care provider, you can also send messages to your care team and make appointments. If you have questions, please call your primary care clinic.  If you do not have a primary care provider, please call [phone] and they will assist you.      [employer] is an electronic gateway that provides easy, online access to your medical records. With [employer], you can request a clinic appointment, read your test results, renew a prescription or communicate with your care team.     To access your existing account, please contact your [facility] or call [phone] for assistance.        Care EveryWhere ID     This is your Care EveryWhere ID. This could be used by other organizations to access your [place] [facility] medical records  [id number]        Your Vitals Were     Pulse                   75            Blood Pressure from Last 3 Encounters:   07/11/17 114/73   06/20/17 114/73   05/16/17 112/73    Weight from Last 3 Encounters:   07/11/17 90.4 kg (199 lb 6.4 oz)   06/20/17 90.2 kg (198 lb 12.8 oz)   05/16/17 89.5 kg (197 lb 6.4 oz)              Today, you had the following     No orders found for display         Today's Medication Changes          These changes are accurate as of: 7/11/17  9:47 AM.  If you have any questions, ask your nurse or doctor.               These medicines have changed or have updated prescriptions.        Dose/Directions    sertraline 25 MG tablet   Commonly known as:  ZOLOFT   This may have changed:    - how much to take  - additional instructions   Used for:  WILLIS (generalized  anxiety disorder)        Take 1.5 tabs (37.5 mg) for 2 weeks then increase to 2 tabs (50 mg) daily   Quantity:  50 tablet   Refills:  0                Primary Care Provider Office Phone # Fax #    Brittany Theresa Penaloza [phone]       [facility] [address] [place] [address]        Equal Access to Services     [facility]: Hadii aad ku hadasho Soomaali, waaxda luqadaha, qaybta kaalmada adeegyada, waxay idiin hayaan adeeg renatorcisschristina lamary . So [facility] [phone].    ATENCIÓN: Si habla espjoselito, tiene a anne disposición servicios gratuitos de asistencia lingüística. Ede al [phone].    We comply with applicable federal civil rights laws and [place] laws. We do not discriminate on the basis of race, color, national origin, age, disability sex, sexual orientation or gender identity.            Thank you!     Thank you for choosing [facility]INIC  for your care. Our goal is always to provide you with excellent care. Hearing back from our patients is one way we can continue to improve our services. Please take a few minutes to complete the written survey that you may receive in the mail after your visit with us. Thank you!             Your Updated Medication List - Protect others around you: Learn how to safely use, store and throw away your medicines at www.disposemymeds.org.          This list is accurate as of: 7/11/17  9:47 AM.  Always use your most recent med list.                   Brand Name Dispense Instructions for use Diagnosis    cetirizine 10 MG tablet    zyrTEC     Take 10 mg by mouth        LORazepam 1 MG tablet   Start taking on:  7/14/2017    ATIVAN    60 tablet    Take 1 tablet (1 mg) by mouth 2 times daily    WILLIS (generalized anxiety disorder)       sertraline 25 MG tablet    ZOLOFT    50 tablet    Take 1.5 tabs (37.5 mg) for 2 weeks then increase to 2 tabs (50 mg) daily    WILLIS (generalized anxiety disorder)       triamcinolone 0.1 % ointment    KENALOG    80  g    Apply topically 2 times daily Avoid face, underarms, groin    Other atopic dermatitis       VITAMIN D PO      Take by mouth daily

## 2017-07-12 PROBLEM — Z92.89 HX OF PSYCHIATRIC CARE: Status: ACTIVE | Noted: 2017-07-12

## 2017-07-12 ASSESSMENT — PATIENT HEALTH QUESTIONNAIRE - PHQ9: SUM OF ALL RESPONSES TO PHQ QUESTIONS 1-9: 9

## 2017-08-08 ENCOUNTER — TELEPHONE (OUTPATIENT)
Dept: PSYCHIATRY | Facility: CLINIC | Age: 27
End: 2017-08-08

## 2017-08-08 ENCOUNTER — OFFICE VISIT (OUTPATIENT)
Dept: PSYCHIATRY | Facility: CLINIC | Age: 27
End: 2017-08-08
Attending: PSYCHIATRY & NEUROLOGY
Payer: COMMERCIAL

## 2017-08-08 VITALS — DIASTOLIC BLOOD PRESSURE: 77 MMHG | WEIGHT: 201.2 LBS | HEART RATE: 75 BPM | SYSTOLIC BLOOD PRESSURE: 119 MMHG

## 2017-08-08 DIAGNOSIS — F41.1 GAD (GENERALIZED ANXIETY DISORDER): ICD-10-CM

## 2017-08-08 PROCEDURE — 99212 OFFICE O/P EST SF 10 MIN: CPT | Mod: ZF

## 2017-08-08 RX ORDER — SERTRALINE HYDROCHLORIDE 25 MG/1
25 TABLET, FILM COATED ORAL DAILY
Qty: 30 TABLET | Refills: 0 | Status: SHIPPED | OUTPATIENT
Start: 2017-08-08 | End: 2017-08-30

## 2017-08-08 RX ORDER — LORAZEPAM 1 MG/1
1 TABLET ORAL 2 TIMES DAILY
Qty: 60 TABLET | Refills: 0 | Status: SHIPPED | OUTPATIENT
Start: 2017-08-08 | End: 2017-09-14

## 2017-08-08 ASSESSMENT — PATIENT HEALTH QUESTIONNAIRE - PHQ9: SUM OF ALL RESPONSES TO PHQ QUESTIONS 1-9: 7

## 2017-08-08 NOTE — PATIENT INSTRUCTIONS
-continue with current medications (zoloft 25mg daily and ativan 1mg twice a day), no changes: refills will be ordered today  -complete genesight testing, please call and confirm what is covered and get first name of person you speak to and time of call if they are giving you a discounted rate: customer service team, [phone] or [email or web address].  -CALL CLINIC today [phone] if able to let us know if you would like to proceed with genesight testing  -return to clinic in 2 weeks

## 2017-08-08 NOTE — TELEPHONE ENCOUNTER
Called [facility] to verify whether lorazepam script faxed earlier in the day has been received.  The pharmacist said that they have not received it and requested that it be re-faxed.  Asked him to call to verify receipt.    Will destroy hard copy once receipt is confirmed.

## 2017-08-08 NOTE — NURSING NOTE
Chief Complaint   Patient presents with     Recheck Medication     WILLIS     Reviewed allergies, smoking status, and pharmacy preference  Administered abuse screening question    Obtained weight, blood pressure and heart rate

## 2017-08-08 NOTE — MR AVS SNAPSHOT
After Visit Summary   8/8/2017    Hal Woo    MRN: [id number]           Patient Information     Date Of Birth          1990        Visit Information        Provider Department      8/8/2017 9:05 AM She Rivera MD Psychiatry Clinic        Today's Diagnoses     WILLIS (generalized anxiety disorder)          Care Instructions    -continue with current medications (zoloft 25mg daily and ativan 1mg twice a day), no changes: refills will be ordered today  -complete genesight testing, please call and confirm what is covered and get first name of person you speak to and time of call if they are giving you a discounted rate: customer service team, [phone] or [email or web address]  -CALL CLINIC today [phone] if able to let us know if you would like to proceed with genesight testing  -return to clinic in 2 weeks          Follow-ups after your visit        Follow-up notes from your care team     Return in about 2 weeks (around 8/22/2017).      Your next 10 appointments already scheduled     Aug 22, 2017  8:05 AM CDT   Adult Med Follow UP with She Rivera MD   Psychiatry Clinic ([facility])    [address] [facility] [address] [phone]              Who to contact     Please call your clinic at [phone] to:    Ask questions about your health    Make or cancel appointments    Discuss your medicines    Learn about your test results    Speak to your doctor   If you have compliments or concerns about an experience at your clinic, or if you wish to file a complaint, please contact [facility] Patient Relations at [phone] or email us at [email or web address]         Additional Information About Your Visit        MyChart Information     [employer] gives you secure access to your electronic health record. If you see a primary care provider, you can also send messages to your care  team and make appointments. If you have questions, please call your primary care clinic.  If you do not have a primary care provider, please call [phone] and they will assist you.      [employer] is an electronic gateway that provides easy, online access to your medical records. With ERN, you can request a clinic appointment, read your test results, renew a prescription or communicate with your care team.     To access your existing account, please contact your [facility] or call [phone] for assistance.        Care EveryWhere ID     This is your Care EveryWhere ID. This could be used by other organizations to access your [place] medical records  [id number]        Your Vitals Were     Pulse                   75            Blood Pressure from Last 3 Encounters:   08/08/17 119/77   07/11/17 114/73   06/20/17 114/73    Weight from Last 3 Encounters:   08/08/17 91.3 kg (201 lb 3.2 oz)   07/11/17 90.4 kg (199 lb 6.4 oz)   06/20/17 90.2 kg (198 lb 12.8 oz)              Today, you had the following     No orders found for display       Primary Care Provider Office Phone # Fax #    Brittany Penaloza [phone]       [address] [address]        Equal Access to Services     [facility]: Hadii candido ku hadasho Soomaali, waaxda luqadaha, qaybta kaalmada adeegyada, [place] hayrobert wright . So [facility] [phone].    ATENCIÓN: Si habla español, tiene a anne disposición servicios gratuitos de asistencia lingüística. Llame al [phone].    We comply with applicable federal civil rights laws and [place] laws. We do not discriminate on the basis of race, color, national origin, age, disability sex, sexual orientation or gender identity.            Thank you!     Thank you for choosing [facility]INIC  for your care. Our goal is always to provide you with excellent care. Hearing back from our patients is one way  we can continue to improve our services. Please take a few minutes to complete the written survey that you may receive in the mail after your visit with us. Thank you!             Your Updated Medication List - Protect others around you: Learn how to safely use, store and throw away your medicines at www.disposemymeds.org.          This list is accurate as of: 8/8/17 10:04 AM.  Always use your most recent med list.                   Brand Name Dispense Instructions for use Diagnosis    cetirizine 10 MG tablet    zyrTEC     Take 10 mg by mouth        LORazepam 1 MG tablet    ATIVAN    60 tablet    Take 1 tablet (1 mg) by mouth 2 times daily    WILLIS (generalized anxiety disorder)       sertraline 25 MG tablet    ZOLOFT    35 tablet    Take 1.0 tabs (25 mg) for 2 weeks then increase to 1.5 tabs (37.5 mg) daily    WILLIS (generalized anxiety disorder)       triamcinolone 0.1 % ointment    KENALOG    80 g    Apply topically 2 times daily Avoid face, underarms, groin    Other atopic dermatitis       VITAMIN D PO      Take by mouth daily

## 2017-08-08 NOTE — PROGRESS NOTES
"  PSYCHIATRY CLINIC PROGRESS NOTE   The initial diagnostic evaluation was on 2/13/17 and the last transfer of care evaluation was 7/11/17.    Pertinent Background:  This patient first experienced mental health issues in childhood where he saw a therapist in grade school and has received treatment for depression and anxiety.  See transfer evaluation for detailed history.  Notably, depression present in childhood along with a low level of anxiety, first noticed an increase in anxiety around May 2011 after graduation from college and moving in with parents for 2 months in the fall of that year. Oct 2011 he had first \"panic attack\" while at work in context of overuse of caffeine to help aid a hanover from a previous night of drinking EtOH. Since 2011 has had a high amount of \"fear and worry\" which is prominent when leaving his home and exacerbated by feeling like he is trapped and does not have an escape route. Hx of passive SI. Has experienced side effects (\"brain on fire\") to every medication tried aside from mirtazapine which was stopped 2/2 weight gain.      Psych critical item history includes suicidal ideation.    INTERIM HISTORY                                                 Hal Woo is a 27 year old male who was last seen in clinic on 7/11/17 at which time he was instructed to increase sertraline to 37.5mg in 2 weeks.  The patient reports good treatment adherence.  History was provided by the patient who was a good historian.  Since the last visit:  -cut his hair this past Saturday, is enjoying this and feels it was a positive change, had long hair for past 2 years  -was having some allergies for the first 2 weeks after last appointment, so he felt poor physically otherwise mood overall is good  -tried to increase his sertraline Thursday last week to 37.5mg, on Friday he felt anxious and angry, so decreased it back down to 25mg  -has been biking, and being active, being somewhat social for him to be " "able to bike and \"go exploring\" around his apartment is a very big deal for him, this used to cause him significant anxiety in the past (several months ago) and now he enjoys doing this  -going to the gym  -finished his semester of school, going back in September, due to group project he received an A- in the class instead of an A which frustrated him but able to put it into perspective that future employers likely will not care about this grade  -feeling tired due to having troubles sleeping at night, not able to determine why he is having a hard time falling asleep, has noticed when he is not sleeping well he feels more confused the next morning, reviewed effects of Ativan, he continues to hope to be able to discontinue use of this medication as he would like to enjoy having a beer every once in awhile without worrying about the effects combined with his medication  -occasional alcohol use (1 beer per week at most)  -denies having any further obsession thoughts regarding his hair line or about people defecating themselves when they die  -denies having any suicidal thoughts since last visit, no thoughts of wanting to hurt others  -continues to go to therapy every other week, finds mindfulness based therapy helpful, has discussed difficulty of increasing medications with his therapist but has not discussed mindfulness based techniques to cope with physical discomfort he may perceive    RECENT SYMPTOMS:   DEPRESSION:  reports-depressed mood, anhedonia, low energy, insomnia , psychomotor change observed by others (slowing) and poor concentration /memory;  DENIES- suicidal ideation , weight/ appetite change (increase or decrease) and excessive guilt  DEVAUGHN/HYPOMANIA:  reports-none;  DENIES- increased energy, decreased sleep need, increased activity and grandiosity  PSYCHOSIS:  reports-none;  DENIES- delusions, auditory hallucinations and visual hallucinations  DYSREGULATION:  reports-none;  DENIES- suicidal ideation, " violent ideation and SIB  PANIC ATTACK:  none   ANXIETY:  excessive worry and obsessions [decreasing]  TRAUMA RELATED:  none  COMPULSIVE:  none  SLEEP:  no significant change     EATING DISORDER: none    RECENT SUBSTANCE USE:     ALCOHOL- rare, has had about 2 drinks in the past few weeks            TOBACCO- none            CAFFEINE- limited intake 2/2 anxiety               CANNABIS- none  OPIOIDS- none            NARCAN KIT- N/A       OTHER ILLICIT DRUGS- none     CURRENT SOCIAL HISTORY:  FINANCIAL SUPPORT- working       CHILDREN- none       LIVING SITUATION- lives alone in apartment      SOCIAL/ SPIRITUAL SUPPORT- friends, family, therapist      FEELS SAFE AT HOME- Yes     MEDICAL ROS:  Reports sexual dysfunction, restlessness and short term memory and/or word finding difficulty and slowed reaction time.  Denies GI [nausea, diarrhea, constipation], weight changes [increase, decrease], headache, sedation, tremor, confusion, excessive diaphoresis, muscle twitches, bruxism, shiver and easy bruising  and withdrawal symptoms.    PSYCH and CD Critical Summary Points since July 2017           Tried 2 trials of increasing sertraline to 37.5mg with increased anxiety and anger within 24 hours    PAST PSYCH MED TRIALS   see EMR Problem List: Hx of psychiatric care    MEDICAL / SURGICAL HISTORY                                   CARE TEAM:    PCP- Dr. Brittany Penaloza    Therapist- Sofía Ramirez, MSW MPH LICSW    Patient Active Problem List   Diagnosis     Anxiety state     Depression     Contact dermatitis     Disturbance in sleep behavior     Generalized anxiety disorder     Insomnia     Panic disorder     Panic disorder with agoraphobia     Vocal cord dysfunction     Vitamin D deficiency     Hx of psychiatric care       ALLERGY                                Pineapple and Seasonal allergies  MEDICATIONS                               Current Outpatient Prescriptions   Medication Sig Dispense Refill     sertraline (ZOLOFT) 25  MG tablet Take 1.0 tabs (25 mg) for 2 weeks then increase to 1.5 tabs (37.5 mg) daily 35 tablet 0     LORazepam (ATIVAN) 1 MG tablet Take 1 tablet (1 mg) by mouth 2 times daily 60 tablet 0     cetirizine (ZYRTEC) 10 MG tablet Take 10 mg by mouth       triamcinolone (KENALOG) 0.1 % ointment Apply topically 2 times daily Avoid face, underarms, groin 80 g 3     Cholecalciferol (VITAMIN D PO) Take by mouth daily       VITALS   /77  Pulse 75  Wt 91.3 kg (201 lb 3.2 oz)   MENTAL STATUS EXAM                                                           Alertness: alert  and oriented  Appearance: well groomed in shorts and tshirt with hair cut much shorter than previous visit  Behavior/Demeanor: cooperative and pleasant, with fair  eye contact   Speech: normal  Language: intact  Psychomotor: less fidgety  Mood: anxious, but improving  Affect: full range; was congruent to mood; was congruent to content  Thought Process/Associations: unremarkable  Thought Content:  Reports none;  Denies suicidal ideation , violent ideation  and psychotic thoughts   Perception:  Reports none;  Denies auditory hallucinations and visual hallucinations  Insight: fair   Judgment: adequate for safety  Cognition: does  appear grossly intact; formal cognitive testing was not done    LABS and DATA     RATING SCALES:  none    PHQ9 TODAY = 7  PHQ-9 SCORE 5/16/2017 6/20/2017 7/11/2017   Total Score 11 9 9     DIAGNOSIS     WILLIS with agoraphobia  R/O MDD    ASSESSMENT                                     TODAY Subhash continues to have improvement in mood and anxiety despite his inability to tolerate increasing his dose in sertraline (due to reports of increased irritability/anger and anxiety). Again discussed the goal of wanting to reach a therapeutic dose of SSRI as a way to target his anxiety and then taper his Ativan for long term management of his symptoms. He again expressed understanding of this. Given his inability to tolerate recent second trial  of increasing sertraline dose, suggested going forward with genesight testing in order to help guide future medication recommendations/adjustments. Subhash had previously been in contact with [employer] re: insurance coverage and believes that although his insurance may not cover this test, he would be able to receive a discounted rate and he would want to pursue this testing if he could confirm this. He plans to call [employer] customer service later today and provide update to writer. If [employer] is covered, will send in this test today and have him follow up in 2 weeks to review results and discuss medication changes.               SUICIDE RISK ASSESSMENT:  Today Hal Woo denies SI. In addition, he has notable risk factors for self-harm, including anxiety, insomnia, worsening symptoms of depression and singe status. However, risk is mitigated by no h/o suicide attempt, no h/o SIB, no h/o risky impulsive behavior, commitment to family, stable job, stable housing, h/o seeking help when needed and future oriented. Therefore, based on all available evidence including the factors cited above, he does not appear to be at imminent risk for self-harm, does not meet criteria for a 72-hr hold, and therefore involuntary hospitalization will not be pursued at this time. However, based on degree of symptoms, voluntary referral for frequent clinic visits was recommended. He accepted this offer.    CONTROLLED SUBSTANCE STATEMENT:  The use of Lorazepam (Ativan) is indicated and appropriate.  This regimen is both beneficial and well tolerated with no adverse effects or tolerance.  There is no evidence of abuse of this medication or other substances.  Warnings related to abuse potential, street value, adverse effects, abrupt cessation, withdrawal and need for emergent care have been discussed and are understood by the patient.  The patient has verbalized clear understanding of safety issues as well as the need to control use.   Plan to continue use at this time.   Controlled Substance Contract was not completed.    [place] PRESCRIPTION MONITORING PROGRAM [] was checked today:  indicates no abuse/misuse of controlled substances, only receiving them from this clinic with no early refills    PSYCHOTROPIC DRUG INTERACTIONS:   None.  MANAGEMENT:  N/A     PLAN                                                                                                       1) PSYCHOTROPIC MEDICATIONS:  - continue sertraline 25mg daily   - continue lorazepam 1mg BID (30 d/s faxed today)    2) THERAPY:  Continue    3) NEXT DUE:    Labs- N/A  Rating Scales- N/A    4) REFERRALS:    Genesight testing     5) RTC: 2 weeks    6) CRISIS NUMBERS:   Provided routinely in AVS.  Especially emphasized:  ONLY if a [facility] PT: Andres [place] Stacy [phone] (clinic), [phone] (after hours)      TREATMENT RISK STATEMENT:  The risks, benefits, alternatives and potential adverse effects have been discussed and are understood by the patient/ patient's guardian. The pt understands the risks of using street drugs or alcohol.  There are no medical contraindications, the pt agrees to treatment with the ability to do so.  The patient understands to call 911 or come to the nearest ED if life threatening or urgent symptoms present.    RESIDENT:   She Rivera,     Patient staffed in clinic with Dr. Brown who will sign the note.    Supervisor is Dr. Michel.  I saw the patient with the resident, and participated in key portions of the service, including the mental status examination and developing the plan of care. I reviewed key portions of the history with the resident. I agree with the findings and plan as documented in this note.    Rody Brown

## 2017-08-09 ENCOUNTER — TRANSFERRED RECORDS (OUTPATIENT)
Dept: HEALTH INFORMATION MANAGEMENT | Facility: CLINIC | Age: 27
End: 2017-08-09

## 2017-08-15 ENCOUNTER — TRANSFERRED RECORDS (OUTPATIENT)
Dept: HEALTH INFORMATION MANAGEMENT | Facility: CLINIC | Age: 27
End: 2017-08-15

## 2017-08-22 ENCOUNTER — OFFICE VISIT (OUTPATIENT)
Dept: PSYCHIATRY | Facility: CLINIC | Age: 27
End: 2017-08-22
Attending: PSYCHIATRY & NEUROLOGY
Payer: COMMERCIAL

## 2017-08-22 VITALS — WEIGHT: 202.4 LBS | HEART RATE: 68 BPM | DIASTOLIC BLOOD PRESSURE: 75 MMHG | SYSTOLIC BLOOD PRESSURE: 116 MMHG

## 2017-08-22 DIAGNOSIS — F41.1 GAD (GENERALIZED ANXIETY DISORDER): Primary | ICD-10-CM

## 2017-08-22 PROCEDURE — 99212 OFFICE O/P EST SF 10 MIN: CPT | Mod: ZF

## 2017-08-22 ASSESSMENT — PATIENT HEALTH QUESTIONNAIRE - PHQ9: SUM OF ALL RESPONSES TO PHQ QUESTIONS 1-9: 8

## 2017-08-22 NOTE — MR AVS SNAPSHOT
After Visit Summary   8/22/2017    Hal Woo    MRN: [id number]           Patient Information     Date Of Birth          1990        Visit Information        Provider Department      8/22/2017 8:05 AM She Rivera MD Psychiatry Clinic        Today's Diagnoses     WILLIS (generalized anxiety disorder)    -  1      Care Instructions    -continue current medications  -Dr. Rivera will call you tomorrow or Thursday to discuss medication changes  -return to clinic in 4 weeks           Follow-ups after your visit        Follow-up notes from your care team     Return in about 4 weeks (around 9/19/2017) for 30 MFU.      Your next 10 appointments already scheduled     Sep 19, 2017  8:35 AM CDT   Adult Med Follow UP with She Rivera MD   Psychiatry Clinic ([facility])    [address] [facility] [address] [phone]              Who to contact     Please call your clinic at [phone] to:    Ask questions about your health    Make or cancel appointments    Discuss your medicines    Learn about your test results    Speak to your doctor   If you have compliments or concerns about an experience at your clinic, or if you wish to file a complaint, please contact [facility] Patient Relations at [phone] or email us at [email or web address]         Additional Information About Your Visit        MyChart Information     [employer] gives you secure access to your electronic health record. If you see a primary care provider, you can also send messages to your care team and make appointments. If you have questions, please call your primary care clinic.  If you do not have a primary care provider, please call [phone] and they will assist you.      [employer]SL is an electronic gateway that provides easy, online access to your medical records. With [employer], you can request a clinic appointment, read  your test results, renew a prescription or communicate with your care team.     To access your existing account, please contact your [facility] or call [phone] for assistance.        Care EveryWhere ID     This is your Care EveryWhere ID. This could be used by other organizations to access your [facility] medical records  [id number]        Your Vitals Were     Pulse                   68            Blood Pressure from Last 3 Encounters:   08/22/17 116/75   08/08/17 119/77   07/11/17 114/73    Weight from Last 3 Encounters:   08/22/17 91.8 kg (202 lb 6.4 oz)   08/08/17 91.3 kg (201 lb 3.2 oz)   07/11/17 90.4 kg (199 lb 6.4 oz)              Today, you had the following     No orders found for display         Today's Medication Changes          These changes are accurate as of: 8/22/17 11:59 PM.  If you have any questions, ask your nurse or doctor.               Start taking these medicines.        Dose/Directions    vilazodone 10 MG Tabs tablet   Commonly known as:  VIIBRYD   Used for:  WILLIS (generalized anxiety disorder)   Started by:  She Rivera MD        Take one half tablet by mouth (5mg) for one week, then increase to taking one full tablet by mouth daily (10mg)   Quantity:  30 tablet   Refills:  1            Where to get your medicines      These medications [id number] - [address], [facility] [address]     Phone:  [phone]     vilazodone 10 MG Tabs tablet                Primary Care Provider Office Phone # Fax #    Brittany Penaloza [phone] [address] [place] [address]        Equal Access to Services     [facility] : Pernell Buchanan, elena iverson, rich tobias. So [facility] [phone].    ATENCIÓN: Si habla español, tiene a anne disposición servicios gratuitos de  naifa lingüística. Ede al [phone].    We comply with applicable federal civil rights laws and [place] laws. We do not discriminate on the basis of race, color, national origin, age, disability sex, sexual orientation or gender identity.            Thank you!     Thank you for choosing PSYCHIATRY CLINIC  for your care. Our goal is always to provide you with excellent care. Hearing back from our patients is one way we can continue to improve our services. Please take a few minutes to complete the written survey that you may receive in the mail after your visit with us. Thank you!             Your Updated Medication List - Protect others around you: Learn how to safely use, store and throw away your medicines at www.disposemymeds.org.          This list is accurate as of: 8/22/17 11:59 PM.  Always use your most recent med list.                   Brand Name Dispense Instructions for use Diagnosis    cetirizine 10 MG tablet    zyrTEC     Take 10 mg by mouth        LORazepam 1 MG tablet    ATIVAN    60 tablet    Take 1 tablet (1 mg) by mouth 2 times daily    WILLIS (generalized anxiety disorder)       sertraline 25 MG tablet    ZOLOFT    30 tablet    Take 1 tablet (25 mg) by mouth daily Take 1.0 tabs (25 mg) for 2 weeks then increase to 1.5 tabs (37.5 mg) daily    WILLIS (generalized anxiety disorder)       triamcinolone 0.1 % ointment    KENALOG    80 g    Apply topically 2 times daily Avoid face, underarms, groin    Other atopic dermatitis       vilazodone 10 MG Tabs tablet    VIIBRYD    30 tablet    Take one half tablet by mouth (5mg) for one week, then increase to taking one full tablet by mouth daily (10mg)    WILLIS (generalized anxiety disorder)       VITAMIN D PO      Take by mouth daily

## 2017-08-22 NOTE — PROGRESS NOTES
"  PSYCHIATRY CLINIC PROGRESS NOTE   The initial diagnostic evaluation was on 2/13/17 and the last transfer of care evaluation was 7/11/17.    Pertinent Background:  This patient first experienced mental health issues in childhood where he saw a therapist in grade school and has received treatment for depression and anxiety.  See transfer evaluation for detailed history.  Notably, depression present in childhood along with a low level of anxiety, first noticed an increase in anxiety around May 2011 after graduation from college and moving in with parents for 2 months in the fall of that year. Oct 2011 he had first \"panic attack\" while at work in context of overuse of caffeine to help aid a hanover from a previous night of drinking EtOH. Since 2011 has had a high amount of \"fear and worry\" which is prominent when leaving his home and exacerbated by feeling like he is trapped and does not have an escape route. Hx of passive SI. Has experienced side effects (\"brain on fire\") to every medication tried aside from mirtazapine which was stopped 2/2 weight gain.      Psych critical item history includes suicidal ideation.    INTERIM HISTORY                                                 Hal Woo is a 27 year old male who was last seen in clinic on 8/18/17 at which time no changes were made.  The patient reports good treatment adherence.  History was provided by the patient who was a good historian.  Since the last visit:  -a week after last appointment, felt he had a decrease in motivation do anything, nothing sounded fun, describes it as \"borderline depression\", possibly some suicidal thoughts that were passive during this time but nothing that stands out to him and has not had any SI in past week; mood has improved since this episode  -no new problems with the medication  -has continued to go therapy which is helpful  -therapist encouraging him to go to the meditation center, this was somewhat anxiety provoking, " he went with a friend, which he felt was slightly more anxiety producing because he felt like he was worrying about having to leave him behind if he were to start to feel sick, etc.  -ruminating and catostrophizing with a medical focus, I.e. Worried he will have a stroke   -did go to some meetings across campus yesterday which went well   -work has been going okay, as long as he is busy, when it is slow becomes bored easily  -continues to go to the gym, also plays rVue with a group on Mon and Wed  -hasn't had a drink of alcohol since last appointment  -did not sleep well last night, kept waking up throughout the night, for the past 2 nights   -continues to have some feelings of confusion in the morning which he attributes to the Ativan but he is able to tolerate this       RECENT SYMPTOMS:   DEPRESSION:  reports-depressed mood, anhedonia, low energy, insomnia , psychomotor change observed by others (slowing) and poor concentration /memory;  DENIES- suicidal ideation , weight/ appetite change (increase or decrease) and excessive guilt  DEVAUGHN/HYPOMANIA:  reports-none;  DENIES- increased energy, decreased sleep need, increased activity and grandiosity  PSYCHOSIS:  reports-none;  DENIES- delusions, auditory hallucinations and visual hallucinations  DYSREGULATION:  reports-none;  DENIES- suicidal ideation, violent ideation and SIB  PANIC ATTACK:  none   ANXIETY:  excessive worry, obsessions [decreasing-still continue to be present surrounding medical issues] and nervous/tense/restless/overwhelmed  TRAUMA RELATED:  none  COMPULSIVE:  none  SLEEP:  no significant change     EATING DISORDER: none    RECENT SUBSTANCE USE:     ALCOHOL- rare, no use since last appointment           TOBACCO- none            CAFFEINE- limited intake 2/2 anxiety               CANNABIS- none  OPIOIDS- none            NARCAN KIT- N/A       OTHER ILLICIT DRUGS- none     CURRENT SOCIAL HISTORY:  FINANCIAL SUPPORT- working       CHILDREN- none        LIVING SITUATION- lives alone in apartment      SOCIAL/ SPIRITUAL SUPPORT- friends, family, therapist      FEELS SAFE AT HOME- Yes     MEDICAL ROS:  Reports sexual dysfunction, restlessness and short term memory and/or word finding difficulty and slowed reaction time.  Denies GI [nausea, diarrhea, constipation], weight changes [increase, decrease], headache, sedation, tremor, confusion, excessive diaphoresis, muscle twitches, bruxism, shiver and easy bruising  and withdrawal symptoms.    PSYCH and CD Critical Summary Points since July 2017           Tried 2 trials of increasing sertraline to 37.5mg with increased anxiety and anger within 24 hours    PAST PSYCH MED TRIALS   see EMR Problem List: Hx of psychiatric care    MEDICAL / SURGICAL HISTORY                                   CARE TEAM:    PCP- Dr. Brittany Penaloza    Therapist- Sofía Ramirez MSW MPH LICSW    Patient Active Problem List   Diagnosis     Anxiety state     Depression     Contact dermatitis     Disturbance in sleep behavior     Generalized anxiety disorder     Insomnia     Panic disorder     Panic disorder with agoraphobia     Vocal cord dysfunction     Vitamin D deficiency     Hx of psychiatric care       ALLERGY                                Pineapple and Seasonal allergies  MEDICATIONS                               Current Outpatient Prescriptions   Medication Sig Dispense Refill     sertraline (ZOLOFT) 25 MG tablet Take 1 tablet (25 mg) by mouth daily Take 1.0 tabs (25 mg) for 2 weeks then increase to 1.5 tabs (37.5 mg) daily 30 tablet 0     LORazepam (ATIVAN) 1 MG tablet Take 1 tablet (1 mg) by mouth 2 times daily 60 tablet 0     cetirizine (ZYRTEC) 10 MG tablet Take 10 mg by mouth       triamcinolone (KENALOG) 0.1 % ointment Apply topically 2 times daily Avoid face, underarms, groin 80 g 3     Cholecalciferol (VITAMIN D PO) Take by mouth daily       VITALS   /75  Pulse 68  Wt 91.8 kg (202 lb 6.4 oz)   MENTAL STATUS EXAM                                                            Alertness: alert  and oriented  Appearance: well groomed and appropriately dressed for weather  Behavior/Demeanor: cooperative and pleasant, with good  eye contact   Speech: normal  Language: intact  Psychomotor: less fidgety  Mood: anxious, but improving  Affect: full range; was congruent to mood; was congruent to content  Thought Process/Associations: unremarkable  Thought Content:  Reports none;  Denies suicidal ideation , violent ideation  and psychotic thoughts   Perception:  Reports none;  Denies auditory hallucinations and visual hallucinations  Insight: fair   Judgment: adequate for safety  Cognition: does  appear grossly intact; formal cognitive testing was not done    LABS and DATA     RATING SCALES:  none    PHQ9 TODAY = 8  PHQ-9 SCORE 6/20/2017 7/11/2017 8/8/2017   Total Score 9 9 7     DIAGNOSIS     WILLIS with agoraphobia  R/O MDD    ASSESSMENT                                     TODAY Subhash continues to have improvement in mood and anxiety despite his inability to tolerate an increased dose in sertraline (due to reports of increased irritability/anger and anxiety). Again discussed the goal of wanting to reach a therapeutic dose of SSRI/SNRI as a way to target his anxiety and then taper his Ativan for long term management of his symptoms. Following his last appointment he completed genesight testing in order to gain more information regarding his metabolism of antidepressants and it revealed he is a normal metabolizer of Viibryd, Trintellix, Fetzima and Pristiq. Writer discussed options with attending clinic physician Dr. Brown and called Subhash with recommendation to stop his sertraline and give it a 1 week period for wash out and then start Viibryd at a low dose (5mg) for 1 week then increase to 10mg until follow up with plan for slow increase up to therapeutic dose of at least 20mg. A [employer] message was also sent to Subhash with explanation of medication  changes. He will continue his current dose of lorazepam. He will follow up in 4 weeks or sooner if needed.               SUICIDE RISK ASSESSMENT:  Today Hal Woo denies SI. In addition, he has notable risk factors for self-harm, including anxiety, insomnia, worsening symptoms of depression and singe status. However, risk is mitigated by no h/o suicide attempt, no h/o SIB, no h/o risky impulsive behavior, commitment to family, stable job, stable housing, h/o seeking help when needed and future oriented. Therefore, based on all available evidence including the factors cited above, he does not appear to be at imminent risk for self-harm, does not meet criteria for a 72-hr hold, and therefore involuntary hospitalization will not be pursued at this time. However, based on degree of symptoms, voluntary referral for frequent clinic visits was recommended. He accepted this offer.    CONTROLLED SUBSTANCE STATEMENT:  The use of Lorazepam (Ativan) is indicated and appropriate.  This regimen is both beneficial and well tolerated with no adverse effects or tolerance.  There is no evidence of abuse of this medication or other substances.  Warnings related to abuse potential, street value, adverse effects, abrupt cessation, withdrawal and need for emergent care have been discussed and are understood by the patient.  The patient has verbalized clear understanding of safety issues as well as the need to control use.  Plan to continue use at this time.   Controlled Substance Contract was not completed.    [place] PRESCRIPTION MONITORING PROGRAM [] was checked today:  indicates no abuse/misuse of controlled substances, only receiving them from this clinic with no early refills    PSYCHOTROPIC DRUG INTERACTIONS:   None.  MANAGEMENT:  N/A     PLAN                                                                                                       1) PSYCHOTROPIC MEDICATIONS:  - discontinue sertraline 25mg daily   - continue  lorazepam 1mg BID (30 d/s faxed today)  - in 7 days start vilazodone 5mg daily for 1 week and then increase to 10mg     2) THERAPY:  Continue  TREATMENT PLAN   Date revised  -  8/22/17  Date next due- 11/22/17    3) NEXT DUE:    Labs- N/A  Rating Scales- N/A    4) REFERRALS:    NONE    5) RTC: 4 weeks    6) CRISIS NUMBERS:   Provided routinely in AVS.  Especially emphasized:  ONLY if a JOSTIN PT: [facility] [phone] (clinic), [phone] (after hours)      TREATMENT RISK STATEMENT:  The risks, benefits, alternatives and potential adverse effects have been discussed and are understood by the patient/ patient's guardian. The pt understands the risks of using street drugs or alcohol.  There are no medical contraindications, the pt agrees to treatment with the ability to do so.  The patient understands to call 911 or come to the nearest ED if life threatening or urgent symptoms present.    PSYCHIATRY CLINIC INDIVIDUAL PSYCHOTHERAPY NOTE                                    16   Start time: 0830  End time: 0850  Subjective: This supportive psychotherapy session addressed issues related to goals of therapy.  Patient's reaction: Preparatory in the context of mental status appropriate for ambulatory setting.  Progress: ongoing just started goals  Plan: RTC 4 weeks  Psychotherapy services during this visit included  myself and Subhash.   TREATMENT  PLAN          SYMPTOMS;PROBLEMS   MEASURABLE GOALS;    FUNCTIONAL IMPROVEMENT INTERVENTIONS;    GAINS MADE DISCHARGE CRITERIA   Anxiety: nervous/tense/restless/overwhelmed   recognize and plan for top 2-3 anxiety-provoking situations  (travel) -work on developing 2 coping strategies to help him enjoy his trip  marked symptom improvement   Depression: depressed mood   improve/ maintain good physical health practices as a coping skill -go to yoga classes marked symptom improvement   RESIDENT:   She Rivera, DO    Patient not staffed in clinic note will be reviewed and  signed by supervisor Dr. Michel.  I did not see this patient directly. I have reviewed the documentation and I agree with the resident's plan of care.     Alesia Michel MD

## 2017-08-22 NOTE — PATIENT INSTRUCTIONS
-continue current medications  -Dr. Rivera will call you tomorrow or Thursday to discuss medication changes  -return to clinic in 4 weeks

## 2017-08-22 NOTE — Clinical Note
This patient was not formally staffed in clinic, I did discuss the plan with Dr. Brown after he had left clinic. Please sign both the progress note and treatment plan. Thanks!

## 2017-08-23 RX ORDER — VILAZODONE HYDROCHLORIDE 10 MG/1
TABLET ORAL
Qty: 30 TABLET | Refills: 1 | Status: SHIPPED | OUTPATIENT
Start: 2017-08-23 | End: 2017-09-12

## 2017-08-30 ENCOUNTER — OFFICE VISIT (OUTPATIENT)
Dept: FAMILY MEDICINE | Facility: CLINIC | Age: 27
End: 2017-08-30

## 2017-08-30 VITALS
OXYGEN SATURATION: 97 % | WEIGHT: 204 LBS | SYSTOLIC BLOOD PRESSURE: 114 MMHG | HEART RATE: 65 BPM | DIASTOLIC BLOOD PRESSURE: 77 MMHG

## 2017-08-30 DIAGNOSIS — B07.8 COMMON WART: ICD-10-CM

## 2017-08-30 DIAGNOSIS — R36.1 HEMATOSPERMIA: ICD-10-CM

## 2017-08-30 DIAGNOSIS — J30.1 ACUTE SEASONAL ALLERGIC RHINITIS DUE TO POLLEN: Primary | ICD-10-CM

## 2017-08-30 RX ORDER — FLUTICASONE PROPIONATE 50 MCG
1 SPRAY, SUSPENSION (ML) NASAL DAILY
Qty: 1 BOTTLE | Refills: 3 | Status: SHIPPED | OUTPATIENT
Start: 2017-08-30 | End: 2019-04-04

## 2017-08-30 RX ORDER — PSEUDOEPHEDRINE HCL 30 MG
TABLET ORAL
Qty: 28 TABLET | Refills: 1 | Status: SHIPPED | OUTPATIENT
Start: 2017-08-30 | End: 2018-02-06

## 2017-08-30 ASSESSMENT — PAIN SCALES - GENERAL: PAINLEVEL: NO PAIN (0)

## 2017-08-30 NOTE — MR AVS SNAPSHOT
After Visit Summary   8/30/2017    Hal Woo    MRN: [id number]           Patient Information     Date Of Birth          1990        Visit Information        Provider Department      8/30/2017 10:20 AM Brittany Penaloza MD [facility]        Today's Diagnoses     Acute seasonal allergic rhinitis due to pollen    -  1    Common wart        Hematospermia          Care Instructions    Allergy symptoms  Sudafed (pseudoephedrine) 30mg  Take 2 in the morning, then again in afternoon    Flonase nasal spray, to shrink down swelling in sinuses, pressure in face    Zyrtec or clairin 10mg daily    Ibuprofen (2 tablet) with food, might bother your stomach   or tylenol (2  tablets) along with above medication    If you wish to have Rx for albuterol inhaler, send me a message and I will fill it.               Follow-ups after your visit        Additional Services     UROLOGY ADULT REFERRAL       Your provider has referred you to: [facility]: [place] [facility] - [place] [phone]   [email or web address]    Please be aware that coverage of these services is subject to the terms and limitations of your health insurance plan.  Call member services at your health plan with any benefit or coverage questions.      Please bring the following with you to your appointment:    (1) Any X-Rays, CTs or MRIs which have been performed.  Contact the facility where they were done to arrange for  prior to your scheduled appointment.    (2) List of current medications  (3) This referral request   (4) Any documents/labs given to you for this referral                  Your next 10 appointments already scheduled     Sep 19, 2017  8:35 AM CDT   Adult Med Follow UP with She Rivera MD   Psychiatry Clinic ([facility])    [address] [facility] [address]    [phone]              Who to contact     Please call your clinic at [phone] to:    Ask questions about your health    Make or cancel appointments    Discuss your medicines    Learn about your test results    Speak to your doctor   If you have compliments or concerns about an experience at your clinic, or if you wish to file a complaint, please contact [facility] Patient Relations at [phone] or email us at [email or web address]         Additional Information About Your Visit        [employer] Information     [employer] gives you secure access to your electronic health record. If you see a primary care provider, you can also send messages to your care team and make appointments. If you have questions, please call your primary care clinic.  If you do not have a primary care provider, please call [phone] and they will assist you.      [employer] is an electronic gateway that provides easy, online access to your medical records. With [employer], you can request a clinic appointment, read your test results, renew a prescription or communicate with your care team.     To access your existing account, please contact your [facility] or call [phone] for assistance.        Care EveryWhere ID     This is your Care EveryWhere ID. This could be used by other organizations to access your [place] medical records  [id number]        Your Vitals Were     Pulse Pulse Oximetry                65 97%           Blood Pressure from Last 3 Encounters:   08/30/17 114/77   08/22/17 116/75   08/08/17 119/77    Weight from Last 3 Encounters:   08/30/17 204 lb (92.5 kg)   08/22/17 202 lb 6.4 oz (91.8 kg)   08/08/17 201 lb 3.2 oz (91.3 kg)              We Performed the Following     DESTRUCT BENIGN LESION, UP TO 14     UROLOGY ADULT REFERRAL          Today's Medication Changes          These changes are accurate as of: 8/30/17 11:24 AM.  If you have any questions,  ask your nurse or doctor.               Start taking these medicines.        Dose/Directions    fluticasone 50 MCG/ACT spray   Commonly known as:  FLONASE   Used for:  Acute seasonal allergic rhinitis due to pollen   Started by:  Brittany Penaloza MD        Dose:  1 spray   Spray 1 spray into both nostrils daily   Quantity:  1 Bottle   Refills:  3       pseudoePHEDrine 30 MG tablet   Commonly known as:  SUDAFED   Used for:  Acute seasonal allergic rhinitis due to pollen   Started by:  Brittany Penaloza MD        Take 2 tablets every 4-6 hr   Quantity:  28 tablet   Refills:  1         Stop taking these medicines if you haven't already. Please contact your care team if you have questions.     sertraline 25 MG tablet   Commonly known as:  ZOLOFT   Stopped by:  Brittany Penaloza MD                Where to get your medicines      These medications were sent to [facility] #[id number] - [address] [address]     Phone:  [phone]     fluticasone 50 MCG/ACT spray         Some of these will need a paper prescription and others can be bought over the counter.  Ask your nurse if you have questions.     Bring a paper prescription for each of these medications     pseudoePHEDrine 30 MG tablet                Primary Care Provider Office Phone # Fax #    Brittany Penaloza [phone]       [address] [place] [address]        Equal Access to Services     ROBERT DODSON AH: Hadii candido wu hadasho Soomaali, waaxda luqadaha, qaybta kaalmada iker, rich garcía. So [facility] [phone].    ATENCIÓN: Si habla español, tiene a anne disposición servicios gratuitos de asistencia lingüística. Ede al [phone].    We comply with applicable federal civil rights laws and [place] laws. We do not discriminate on the basis of race, color, national origin, age, disability sex, sexual orientation  or gender identity.            Thank you!     Thank you for choosing [facility]  for your care. Our goal is always to provide you with excellent care. Hearing back from our patients is one way we can continue to improve our services. Please take a few minutes to complete the written survey that you may receive in the mail after your visit with us. Thank you!             Your Updated Medication List - Protect others around you: Learn how to safely use, store and throw away your medicines at www.disposemymeds.org.          This list is accurate as of: 8/30/17 11:24 AM.  Always use your most recent med list.                   Brand Name Dispense Instructions for use Diagnosis    cetirizine 10 MG tablet    zyrTEC     Take 10 mg by mouth        fluticasone 50 MCG/ACT spray    FLONASE    1 Bottle    Spray 1 spray into both nostrils daily    Acute seasonal allergic rhinitis due to pollen       LORazepam 1 MG tablet    ATIVAN    60 tablet    Take 1 tablet (1 mg) by mouth 2 times daily    WILLIS (generalized anxiety disorder)       pseudoePHEDrine 30 MG tablet    SUDAFED    28 tablet    Take 2 tablets every 4-6 hr    Acute seasonal allergic rhinitis due to pollen       triamcinolone 0.1 % ointment    KENALOG    80 g    Apply topically 2 times daily Avoid face, underarms, groin    Other atopic dermatitis       vilazodone 10 MG Tabs tablet    VIIBRYD    30 tablet    Take one half tablet by mouth (5mg) for one week, then increase to taking one full tablet by mouth daily (10mg)    WILLIS (generalized anxiety disorder)       VITAMIN D PO      Take by mouth daily

## 2017-08-30 NOTE — PATIENT INSTRUCTIONS
Allergy symptoms  Sudafed (pseudoephedrine) 30mg  Take 2 in the morning, then again in afternoon    Flonase nasal spray, to shrink down swelling in sinuses, pressure in face    Zyrtec or clairin 10mg daily    Ibuprofen (2 tablet) with food, might bother your stomach   or tylenol (2  tablets) along with above medication    If you wish to have Rx for albuterol inhaler, send me a message and I will fill it.

## 2017-08-30 NOTE — PROGRESS NOTES
Hal Woo is a 27 year old male here for the following issues:    Sinus pressure  Subhash is a 26 yo male with week long symptoms of sore throat, cough, weird sensatoin in his head, headaches, temple pressure, maxillary sinus pain. He has no fevers. Taking nyquil and dayquil. He has a hx of seasonal allergies. He takes zyrtec daily. No use of nasal sprays.     Eczema is flaring. He is using lotion and topical triamcionone.     Depression and anxiety  Subhash follows with a psychiatrist at the [facility]. He reports he was told to stop taking Zoloft last week by his psychiatrist. He had genetic testing and was told Zoloft was unlikely to work. He was started on Vibryd, Vilazodone. This medication was not covered on insurance so he is awaiting prior authorization    PHQ9 score = 9  WILLIS 7 score = 8    Wart   He has a wart on index finger of left hand, pip joint. Would like this treated    Hematospermia  He reports several episodes of bloody semen, occurring over the past month. No dysuria, no flank pain or fever. He would like to follow up with a urologist as symptoms have been persistent.     Patient Active Problem List   Diagnosis     Anxiety state     Depression     Contact dermatitis     Disturbance in sleep behavior     Generalized anxiety disorder     Insomnia     Panic disorder     Panic disorder with agoraphobia     Vocal cord dysfunction     Vitamin D deficiency     Hx of psychiatric care       Current Outpatient Prescriptions   Medication Sig Dispense Refill     vilazodone (VIIBRYD) 10 MG TABS tablet Take one half tablet by mouth (5mg) for one week, then increase to taking one full tablet by mouth daily (10mg) 30 tablet 1     LORazepam (ATIVAN) 1 MG tablet Take 1 tablet (1 mg) by mouth 2 times daily 60 tablet 0     cetirizine (ZYRTEC) 10 MG tablet Take 10 mg by mouth       triamcinolone (KENALOG) 0.1 % ointment Apply topically 2 times daily Avoid face, underarms, groin 80 g 3     Cholecalciferol (VITAMIN D  PO) Take by mouth daily         Allergies   Allergen Reactions     Pineapple Difficulty breathing, Hives, Itching, Rash and Shortness Of Breath     Seasonal Allergies         EXAM  /77 (BP Location: Left arm, Patient Position: Sitting, Cuff Size: Adult Large)  Pulse 65  Wt 204 lb (92.5 kg)  SpO2 97%  Gen: appears congested, pleasant  HEENT: tenderness  With palpation over maxillary and frontal sinuses. Boggy nasal turbinates  COR: S1,S2, no murmur  Lungs: CTA bilaterally, no rhonchi, wheezes or rales  Skin: verrucous lesion on the left PIP joint    Procedure: LIQ N2 applied multiple times to lesion , he tolerated procedure well.       Assessment:  (J30.1) Acute seasonal allergic rhinitis due to pollen  (primary encounter diagnosis)  Comment: congestion, hx of seasonal allergies  Plan: fluticasone (FLONASE) 50 MCG/ACT spray,         pseudoePHEDrine (SUDAFED) 30 MG tablet        Recommend flonase and sudafed, discussed albuterol inhaler for cough but he declines    (B07.8) Common wart  Comment: index finger of left hand  Plan: DESTRUCT BENIGN LESION, UP TO 14        Treated with LIQ N2 today, covered with light bandage. May return in 1-2 wks for second treatment or use OTC salicylic acid on lesion.     (R36.1) Hematospermia  Comment: persistent over the past month  Plan: UROLOGY ADULT REFERRAL        Refer to urology    Brittany Penaloza MD  Internal Medicine/Pediatrics

## 2017-08-30 NOTE — NURSING NOTE
Chief Complaint   Patient presents with     Wart     right pointer finger     Sinus Problem     27 year old      Blood pressure 114/77, pulse 65, weight 204 lb (92.5 kg), SpO2 97 %. There is no height or weight on file to calculate BMI.    Wt Readings from Last 2 Encounters:   08/30/17 204 lb (92.5 kg)   01/18/17 195 lb (88.5 kg)     BP Readings from Last 3 Encounters:   08/30/17 114/77   03/20/17 122/75   01/18/17 121/79       Patient Active Problem List   Diagnosis     Anxiety state     Depression     Contact dermatitis     Disturbance in sleep behavior     Generalized anxiety disorder     Insomnia     Panic disorder     Panic disorder with agoraphobia     Vocal cord dysfunction     Vitamin D deficiency     Hx of psychiatric care       Current Outpatient Prescriptions   Medication Sig Dispense Refill     vilazodone (VIIBRYD) 10 MG TABS tablet Take one half tablet by mouth (5mg) for one week, then increase to taking one full tablet by mouth daily (10mg) 30 tablet 1     LORazepam (ATIVAN) 1 MG tablet Take 1 tablet (1 mg) by mouth 2 times daily 60 tablet 0     cetirizine (ZYRTEC) 10 MG tablet Take 10 mg by mouth       triamcinolone (KENALOG) 0.1 % ointment Apply topically 2 times daily Avoid face, underarms, groin 80 g 3     Cholecalciferol (VITAMIN D PO) Take by mouth daily         Social History   Substance Use Topics     Smoking status: Never Smoker     Smokeless tobacco: Never Used     Alcohol use 0.0 oz/week     0 Standard drinks or equivalent per week         There are no preventive care reminders to display for this patient.    JACINTO TORRES CMA  August 30, 2017 10:36 AM

## 2017-09-05 ENCOUNTER — TELEPHONE (OUTPATIENT)
Dept: PSYCHIATRY | Facility: CLINIC | Age: 27
End: 2017-09-05

## 2017-09-05 NOTE — TELEPHONE ENCOUNTER
Called patient and asked him to have the pharmacy to fax a request for prior authorization since this has not been received.  Attempted to submit through covermymeds, but patient was not found in the system.  Called [employer] to get prior authorization through the phone, but was unable to get through to a person before the system hung up because they need a taxpayer ID before they can proceed.

## 2017-09-05 NOTE — TELEPHONE ENCOUNTER
----- Message from Montse Crowe sent at 9/5/2017 10:56 AM CDT -----  Regarding: prior auth  Contact: [phone]  This pt saw Dr. Rivera 2 weeks ago and he was supposed to start taking Viibryd. His pharmacy called him and told him it wasn't covered, and he said that he spoke with someone who said that Dr. Rivera would get going on a prior auth for the med (although I didn't see any record of this in his chart). He would like a call back to figure out what's going on, because he would like to actually start taking the medication! Okay to leave a detailed vm.

## 2017-09-06 NOTE — TELEPHONE ENCOUNTER
Called [employer] at [phone] and spoke with Nayla, who ran a test claim and said that the medication is excluded from the [facility] drug formulary.  She explained that this would not need a prior authorization as that only applies to medications not specifically excluded by the plan.  This would require a coverage exception.  She will fax the form, and she advised that the U of  has a turnaround time of 48 hours.  Submitting the coverage exception form is not a guarantee that the plan will agree to cover it.

## 2017-09-07 NOTE — TELEPHONE ENCOUNTER
"Received a fax from [employer] denying coverage of the medication because \"your provider has not provided information to show you have been stable on the requested drug for 90 days or more.\"    "

## 2017-09-08 NOTE — TELEPHONE ENCOUNTER
Resubmitted coverage exception through [employer] and included Dr. Rivera's letter.

## 2017-09-11 ENCOUNTER — CARE COORDINATION (OUTPATIENT)
Dept: PSYCHIATRY | Facility: CLINIC | Age: 27
End: 2017-09-11

## 2017-09-11 ENCOUNTER — MYC MEDICAL ADVICE (OUTPATIENT)
Dept: PSYCHIATRY | Facility: CLINIC | Age: 27
End: 2017-09-11

## 2017-09-11 DIAGNOSIS — F41.1 GAD (GENERALIZED ANXIETY DISORDER): Primary | ICD-10-CM

## 2017-09-12 ENCOUNTER — OFFICE VISIT (OUTPATIENT)
Dept: FAMILY MEDICINE | Facility: CLINIC | Age: 27
End: 2017-09-12

## 2017-09-12 VITALS
HEART RATE: 71 BPM | BODY MASS INDEX: 32.76 KG/M2 | HEIGHT: 66 IN | SYSTOLIC BLOOD PRESSURE: 122 MMHG | DIASTOLIC BLOOD PRESSURE: 72 MMHG | TEMPERATURE: 98.1 F | WEIGHT: 203.81 LBS | OXYGEN SATURATION: 97 %

## 2017-09-12 DIAGNOSIS — Z23 FLU VACCINE NEED: ICD-10-CM

## 2017-09-12 DIAGNOSIS — B07.8 COMMON WART: Primary | ICD-10-CM

## 2017-09-12 RX ORDER — DESVENLAFAXINE 25 MG/1
25 TABLET, EXTENDED RELEASE ORAL DAILY
Qty: 30 TABLET | Refills: 0 | Status: SHIPPED | OUTPATIENT
Start: 2017-09-12 | End: 2017-10-12

## 2017-09-12 ASSESSMENT — ANXIETY QUESTIONNAIRES
5. BEING SO RESTLESS THAT IT IS HARD TO SIT STILL: SEVERAL DAYS
IF YOU CHECKED OFF ANY PROBLEMS ON THIS QUESTIONNAIRE, HOW DIFFICULT HAVE THESE PROBLEMS MADE IT FOR YOU TO DO YOUR WORK, TAKE CARE OF THINGS AT HOME, OR GET ALONG WITH OTHER PEOPLE: SOMEWHAT DIFFICULT
1. FEELING NERVOUS, ANXIOUS, OR ON EDGE: MORE THAN HALF THE DAYS
7. FEELING AFRAID AS IF SOMETHING AWFUL MIGHT HAPPEN: MORE THAN HALF THE DAYS
2. NOT BEING ABLE TO STOP OR CONTROL WORRYING: MORE THAN HALF THE DAYS
GAD7 TOTAL SCORE: 13
6. BECOMING EASILY ANNOYED OR IRRITABLE: SEVERAL DAYS
3. WORRYING TOO MUCH ABOUT DIFFERENT THINGS: MORE THAN HALF THE DAYS

## 2017-09-12 ASSESSMENT — PAIN SCALES - GENERAL: PAINLEVEL: NO PAIN (0)

## 2017-09-12 ASSESSMENT — PATIENT HEALTH QUESTIONNAIRE - PHQ9
SUM OF ALL RESPONSES TO PHQ QUESTIONS 1-9: 13
5. POOR APPETITE OR OVEREATING: NEARLY EVERY DAY

## 2017-09-12 NOTE — NURSING NOTE
"27 year old  Chief Complaint   Patient presents with     RECHECK     follow up on wart removal from finger     Flu Shot       Blood pressure 122/72, pulse 71, temperature 98.1  F (36.7  C), temperature source Oral, height 5' 6\" (167.6 cm), weight 203 lb 13 oz (92.4 kg), SpO2 97 %. Body mass index is 32.9 kg/(m^2).  Patient Active Problem List   Diagnosis     Anxiety state     Depression     Contact dermatitis     Disturbance in sleep behavior     Generalized anxiety disorder     Insomnia     Panic disorder     Panic disorder with agoraphobia     Vocal cord dysfunction     Vitamin D deficiency     Hx of psychiatric care       Wt Readings from Last 2 Encounters:   09/12/17 203 lb 13 oz (92.4 kg)   08/30/17 204 lb (92.5 kg)     BP Readings from Last 3 Encounters:   09/12/17 122/72   08/30/17 114/77   03/20/17 122/75         Current Outpatient Prescriptions   Medication     vortioxetine (TRINTELLIX/BRINTELLIX) 5 MG tablet     fluticasone (FLONASE) 50 MCG/ACT spray     pseudoePHEDrine (SUDAFED) 30 MG tablet     LORazepam (ATIVAN) 1 MG tablet     cetirizine (ZYRTEC) 10 MG tablet     triamcinolone (KENALOG) 0.1 % ointment     Cholecalciferol (VITAMIN D PO)     No current facility-administered medications for this visit.        Social History   Substance Use Topics     Smoking status: Never Smoker     Smokeless tobacco: Never Used     Alcohol use 0.0 oz/week     0 Standard drinks or equivalent per week       Health Maintenance Due   Topic Date Due     INFLUENZA VACCINE (SYSTEM ASSIGNED)  09/01/2017       No results found for: PAP      September 12, 2017 9:40 AM    "

## 2017-09-12 NOTE — PROGRESS NOTES
"  SUBJECTIVE:   Hal Woo is a 27 year old male who presents to clinic today for the following health issues:    WART(S):  Patient is here for the second treatment of a wart on his right pointer finger.  Was treated by PCP with cryotherapy.  Tolerated well.  He has not used any OTC or home treatments.   Onset: About a year ago    Description:   Location: Right index finger dorsum MIP joint  Number of warts: one  Painful: no    Accompanying Signs & Symptoms:  Signs of infection: no    History:   History of trauma: no  Prior warts: YES- only as a child    Therapies Tried and outcome: liquid nitrogen x1      History of depression followed by psychiatry.    Problem list and histories reviewed & adjusted, as indicated.  Additional history: as documented        Reviewed and updated as needed this visit by clinical staffTobacco  Allergies  Meds  Problems       Reviewed and updated as needed this visit by Provider  Allergies  Meds  Problems       ROS:  C: NEGATIVE for fever, chills, change in weight  E/M: NEGATIVE for ear, mouth and throat problems  R: NEGATIVE for significant cough or SOB  CV: NEGATIVE for chest pain, palpitations or peripheral edema    OBJECTIVE:     /72 (BP Location: Left arm, Patient Position: Sitting, Cuff Size: Adult Large)  Pulse 71  Temp 98.1  F (36.7  C) (Oral)  Ht 5' 6\" (167.6 cm)  Wt 203 lb 13 oz (92.4 kg)  SpO2 97%  BMI 32.9 kg/m2  Body mass index is 32.9 kg/(m^2).  GENERAL: healthy, alert and no distress  SKIN: 4mm wart on the dorsum of the right index finger.    Area was shaved with a 15 blade and treated with liquid nitrogen in 3 freeze thaw cycles. Tolerated well.    Diagnostic Test Results:  none     ASSESSMENT/PLAN:       ICD-10-CM    1. Common wart B07.8    2. Flu vaccine need Z23 HC FLU VAC PRESRV FREE QUAD SPLIT VIR 3+YRS IM     ADMIN INFLUENZA VIRUS VACCINE       See patient instructions.  Home care discussed.  Etiology discussed.  Consider retreatment no " sooner that 2 weeks from now.      Leona Leon PA-C  [facility]

## 2017-09-12 NOTE — MR AVS SNAPSHOT
After Visit Summary   9/12/2017    Hal Woo    MRN: [id number]           Patient Information     Date Of Birth          1990        Visit Information        Provider Department      9/12/2017 9:40 AM Leona Leon PA-C [facility]        Today's Diagnoses     Flu vaccine need    -  1      Care Instructions      Treating Warts     You and your healthcare provider can discuss whether your warts need to be treated.     You and your healthcare provider can talk about what treatment may be best for your wart or warts. To get rid of your warts, your healthcare provider may need to try more than one type of treatment. The methods described below are often used to treat warts.  Types of treatment    Do nothing. Most warts will resolve within 2 years, even without treatment. So doing nothing is sometimes a good option. This is particularly true for smaller warts that are not causing symptoms.    Cryotherapy (liquid nitrogen). This kills skin cells by freezing them. It kills the warts and destroys skin infected by the wart-causing virus. This is done in your healthcare provider s office and will cause some discomfort. It may take several treatments over several weeks to get rid of the warts.    Topical medicines. Prescribed topical medicines can be put on the skin. These are usually applied in the healthcare provider's office. But some prescriptions may be applied at home.    Over-the-counter (OTC) topical treatments. OTC medicines that most often contain salicylic acid may be an option. These patches, liquids, and creams are used at home. The medicine is applied daily to the wart and nearby skin. It's usually left on overnight. The dead skin is filed down the next day. In 1 to 3 days, the procedure can be repeated. Topical treatments are sometimes combined with cryotherapy.    Electrodessication and curettage (ED & C).  For this procedure, the healthcare provider applies numbing  medicine to the wart. Then the wart is scraped or cut off. This type of treatment is usually not the first line of therapy.    Laser surgery.  This can vaporize wart tissue or destroy the blood vessels that feed the wart. This is done in the healthcare provider's office.    Shots (injections). These can be used to treat warts that don t respond to other treatments, such as stubborn or painful warts around the nails. This is done in the healthcare provider s office.  When to seek medical treatment  It s a good idea to have your healthcare provider check your warts. That way your provider can rule out any other skin problems. Sometimes a callous or a corn can look like a wart, but the treatments may differ. Treatment can also provide relief from warts that bleed, burn, hurt, or itch. Genital warts should always be treated. They can spread to other people through sexual contact. And they may cause genital or cervical cancer.  Getting good results  After having your warts treated, new warts may still appear. Don t be discouraged. Warts often come back. See your healthcare provider again to discuss this. Your provider can tell you about the treatments that most likely will help clear your skin of warts.   Date Last Reviewed: 2/1/2017 2000-2017 [employer]. [address] [place] [address], [place] [address]. All rights reserved. This information is not intended as a substitute for professional medical care. Always follow your healthcare professional's instructions.                Follow-ups after your visit        Your next 10 appointments already scheduled     Sep 19, 2017  8:35 AM CDT   Adult Med Follow UP with She Rivera MD   Psychiatry Clinic ([facility])    [address] [facility] [address] [phone]              Who to contact     Please call your clinic at [phone] to:    Ask questions about your health    Make or cancel  "appointments    Discuss your medicines    Learn about your test results    Speak to your doctor   If you have compliments or concerns about an experience at your clinic, or if you wish to file a complaint, please contact [facility] Patient Relations at [phone] or email us at [email or web address]         Additional Information About Your Visit        [employer] Information     [employer] gives you secure access to your electronic health record. If you see a primary care provider, you can also send messages to your care team and make appointments. If you have questions, please call your primary care clinic.  If you do not have a primary care provider, please call [phone] and they will assist you.      [employer] is an electronic gateway that provides easy, online access to your medical records. With [employer], you can request a clinic appointment, read your test results, renew a prescription or communicate with your care team.     To access your existing account, please contact your [facility] or call [phone] for assistance.        Care EveryWhere ID     This is your Care EveryWhere ID. This could be used by other organizations to access your [facility] medical records  [id number]        Your Vitals Were     Pulse Temperature Height Pulse Oximetry BMI (Body Mass Index)       71 98.1  F (36.7  C) (Oral) 5' 6\" (167.6 cm) 97% 32.9 kg/m2        Blood Pressure from Last 3 Encounters:   09/12/17 122/72   08/30/17 114/77   03/20/17 122/75    Weight from Last 3 Encounters:   09/12/17 203 lb 13 oz (92.4 kg)   08/30/17 204 lb (92.5 kg)   01/18/17 195 lb (88.5 kg)              We Performed the Following     ADMIN INFLUENZA VIRUS VACCINE     HC FLU VAC PRESRV FREE QUAD SPLIT VIR 3+YRS IM        Primary Care Provider Office Phone # Fax #    Brittany Penaloza [phone] [address] [place] [address]        Equal Access " to Services     ROBERT DODSON : Pernell Buchanan, wamk iverson, qaybmihaela kaalmarich huff. So [facility] [phone].    ATENCIÓN: Si tamia farley, tiene a anne disposición servicios gratuitos de asistencia lingüística. Llame al [phone].    We comply with applicable federal civil rights laws and [place] laws. We do not discriminate on the basis of race, color, national origin, age, disability sex, sexual orientation or gender identity.            Thank you!     Thank you for choosing [facility]  for your care. Our goal is always to provide you with excellent care. Hearing back from our patients is one way we can continue to improve our services. Please take a few minutes to complete the written survey that you may receive in the mail after your visit with us. Thank you!             Your Updated Medication List - Protect others around you: Learn how to safely use, store and throw away your medicines at www.disposemymeds.org.          This list is accurate as of: 9/12/17  9:57 AM.  Always use your most recent med list.                   Brand Name Dispense Instructions for use Diagnosis    cetirizine 10 MG tablet    zyrTEC     Take 10 mg by mouth        fluticasone 50 MCG/ACT spray    FLONASE    1 Bottle    Spray 1 spray into both nostrils daily    Acute seasonal allergic rhinitis due to pollen       LORazepam 1 MG tablet    ATIVAN    60 tablet    Take 1 tablet (1 mg) by mouth 2 times daily    WILLIS (generalized anxiety disorder)       pseudoePHEDrine 30 MG tablet    SUDAFED    28 tablet    Take 2 tablets every 4-6 hr    Acute seasonal allergic rhinitis due to pollen       triamcinolone 0.1 % ointment    KENALOG    80 g    Apply topically 2 times daily Avoid face, underarms, groin    Other atopic dermatitis       VITAMIN D PO      Take by mouth daily        vortioxetine 5 MG tablet    TRINTELLIX/BRINTELLIX    30 tablet    Take one half tablet (2.5mg) per  day for 7 days then increase to 1 full tablet (5mg).    WILLIS (generalized anxiety disorder)

## 2017-09-12 NOTE — NURSING NOTE

## 2017-09-12 NOTE — PATIENT INSTRUCTIONS
Treating Warts     You and your healthcare provider can discuss whether your warts need to be treated.     You and your healthcare provider can talk about what treatment may be best for your wart or warts. To get rid of your warts, your healthcare provider may need to try more than one type of treatment. The methods described below are often used to treat warts.  Types of treatment    Do nothing. Most warts will resolve within 2 years, even without treatment. So doing nothing is sometimes a good option. This is particularly true for smaller warts that are not causing symptoms.    Cryotherapy (liquid nitrogen). This kills skin cells by freezing them. It kills the warts and destroys skin infected by the wart-causing virus. This is done in your healthcare provider s office and will cause some discomfort. It may take several treatments over several weeks to get rid of the warts.    Topical medicines. Prescribed topical medicines can be put on the skin. These are usually applied in the healthcare provider's office. But some prescriptions may be applied at home.    Over-the-counter (OTC) topical treatments. OTC medicines that most often contain salicylic acid may be an option. These patches, liquids, and creams are used at home. The medicine is applied daily to the wart and nearby skin. It's usually left on overnight. The dead skin is filed down the next day. In 1 to 3 days, the procedure can be repeated. Topical treatments are sometimes combined with cryotherapy.    Electrodessication and curettage (ED & C).  For this procedure, the healthcare provider applies numbing medicine to the wart. Then the wart is scraped or cut off. This type of treatment is usually not the first line of therapy.    Laser surgery.  This can vaporize wart tissue or destroy the blood vessels that feed the wart. This is done in the healthcare provider's office.    Shots (injections). These can be used to treat warts that don t respond to other  treatments, such as stubborn or painful warts around the nails. This is done in the healthcare provider s office.  When to seek medical treatment  It s a good idea to have your healthcare provider check your warts. That way your provider can rule out any other skin problems. Sometimes a callous or a corn can look like a wart, but the treatments may differ. Treatment can also provide relief from warts that bleed, burn, hurt, or itch. Genital warts should always be treated. They can spread to other people through sexual contact. And they may cause genital or cervical cancer.  Getting good results  After having your warts treated, new warts may still appear. Don t be discouraged. Warts often come back. See your healthcare provider again to discuss this. Your provider can tell you about the treatments that most likely will help clear your skin of warts.   Date Last Reviewed: 2/1/2017 2000-2017 [employer] [address] [place] [address], [place] [address]. All rights reserved. This information is not intended as a substitute for professional medical care. Always follow your healthcare professional's instructions.

## 2017-09-13 ASSESSMENT — ANXIETY QUESTIONNAIRES: GAD7 TOTAL SCORE: 13

## 2017-09-14 ENCOUNTER — MYC MEDICAL ADVICE (OUTPATIENT)
Dept: PSYCHIATRY | Facility: CLINIC | Age: 27
End: 2017-09-14

## 2017-09-14 DIAGNOSIS — F41.1 GAD (GENERALIZED ANXIETY DISORDER): ICD-10-CM

## 2017-09-14 RX ORDER — LORAZEPAM 1 MG/1
1 TABLET ORAL 2 TIMES DAILY
Qty: 60 TABLET | Refills: 0 | Status: SHIPPED | OUTPATIENT
Start: 2017-09-14 | End: 2017-10-12

## 2017-09-14 NOTE — TELEPHONE ENCOUNTER
Received RF request from patient for:    LORazepam (ATIVAN) 1 MG tablet 60 tablet 0 8/8/2017  No   Sig: Take 1 tablet (1 mg) by mouth 2 times daily     Last RF:  Per med tab: 8/8/17  Per [place] : 8/8, 7/11, 6/18, 5/16, 4/18    Due for RF:  yes    Appointment History:  Last appt: 8/22/17  RTC: 4 weeks  Cancel: none  No-show: none  Next appt: 9/19/17    Will refill 30 d/s per protocol since patient is compliant with treatment recommendations, is within the RTC timeframe, and has been taking this medication consistently for a long period of time.

## 2017-09-15 NOTE — TELEPHONE ENCOUNTER
ROSA lin received on 9/9/17.      Forwarded to scanning.  Dr. Rivera to discuss options with patient.

## 2017-09-19 ENCOUNTER — OFFICE VISIT (OUTPATIENT)
Dept: PSYCHIATRY | Facility: CLINIC | Age: 27
End: 2017-09-19
Attending: PSYCHIATRY & NEUROLOGY
Payer: COMMERCIAL

## 2017-09-19 VITALS
HEART RATE: 73 BPM | WEIGHT: 206.4 LBS | BODY MASS INDEX: 33.31 KG/M2 | SYSTOLIC BLOOD PRESSURE: 111 MMHG | DIASTOLIC BLOOD PRESSURE: 72 MMHG

## 2017-09-19 DIAGNOSIS — F41.1 GAD (GENERALIZED ANXIETY DISORDER): Primary | ICD-10-CM

## 2017-09-19 PROCEDURE — 99212 OFFICE O/P EST SF 10 MIN: CPT | Mod: ZF

## 2017-09-19 NOTE — MR AVS SNAPSHOT
After Visit Summary   9/19/2017    Hal Woo    MRN: [id number]           Patient Information     Date Of Birth          1990        Visit Information        Provider Department      9/19/2017 8:35 AM She Rivera MD Psychiatry Clinic        Today's Diagnoses     WILLIS (generalized anxiety disorder)    -  1      Care Instructions    -start Pristiq on Thursday evening, take 25mg daily until you're tolerating the side effects for 1 week then increase to 50mg daily  -return to clinic in 3 weeks or sooner if needed        SAFETY PLAN    Completed on 9/19/17 at which time the following was reported: suicidal ideation without plan; without intent [details in Interim History].  It was determined that ongoing outpatient care was appropriate in addition to establishing the following care plan:     1. Triggers which can possibly cause thoughts of self-harm:   Thoughts- being hard on self, not liking self and not liking situation he is in  Mood or behavior- increased depression  Situation-increase in stressful situations       2. Coping strategies:  ice pack on face- reviewed  paced breathing- reviewed  progressive muscle relaxation- reviewed  intense exercise- reviewed  other- trying to go to sleep, distraction techniques--music, Internet, video games     3. Ways to increase safety:  -no access to weapons    4. People I can call for help: (friends, family and/or professionals)    Name: Parents    Phone: has numbers in his phone    Name: She Rivera DO              Phone: [facility] [phone] (clinic)                                                                                                 [phone] (after hours)      CRISIS MOBILE TEAMS  [facility]:    [phone]  (Adults > 19yo)    [phone]  (Child   < 18yo)                                       FRONT DOOR (during the day could call)   [phone]    [facility] -[phone] (Adult)  OR  [phone] (Child)     [facility] -[phone] (Adult and Child)     [facility] -[phone] (Adult and Child)     [facility] -[phone] (Adult and Child)     [place] EMERGENCY NUMBERS      Crisis Connection:                                [phone]    [facility]:     [phone]    Crisis Intervention:                                [phone] or [phone]     COPE: [place] Team 24hrs/7days:    [phone]  (Adults > 17yo)                                                                            [phone]  (Child   < 18yo)  [facility]        [phone] 24/7 line and [place] Team   [address] [place] [address]  DROP IN:  M-F: 8:00 am - 7:00 pm  Sat: 11:00 am - 3:00 pm   Sun: Closed    CRISIS HOUSING   [address]     [phone]    WALK-IN COUNSELING:  [facility]       [phone]    [address]:   M, W, F:  1:00-3:00PM    M-Th:  6:30-8:30PM                      Follow-ups after your visit        Follow-up notes from your care team     Return in about 3 weeks (around 10/10/2017) for 30 MFU.      Your next 10 appointments already scheduled     Oct 12, 2017  8:35 AM CDT   Adult Med Follow UP with She Rivera MD   Psychiatry Clinic ([facility])    [address]  [address] [facility] [id number] [phone]              Who to contact     Please call your clinic at [phone] to:    Ask questions about your health    Make or cancel appointments    Discuss your medicines    Learn about your test results    Speak to your doctor   If you have compliments or concerns about an experience at your clinic, or if you wish to file a complaint, please contact [facility] Patient Relations at [phone] or email us at [email or web address]          Additional Information About Your Visit        SiTimehart Information     MiTÃº gives you secure access to your electronic health record. If you see a primary care provider, you can also send messages to your care team and make appointments. If you have questions, please call your primary care clinic.  If you do not have a primary care provider, please call [phone] and they will assist you.      MiTÃº is an electronic gateway that provides easy, online access to your medical records. With MiTÃº, you can request a clinic appointment, read your test results, renew a prescription or communicate with your care team.     To access your existing account, please contact your [facility] or call [phone] for assistance.        Care EveryWhere ID     This is your Care EveryWhere ID. This could be used by other organizations to access your [facility] medical records  [id number]        Your Vitals Were     Pulse BMI (Body Mass Index)                73 33.31 kg/m2           Blood Pressure from Last 3 Encounters:   09/19/17 111/72   09/12/17 122/72   08/30/17 114/77    Weight from Last 3 Encounters:   09/19/17 93.6 kg (206 lb 6.4 oz)   09/12/17 92.4 kg (203 lb 13 oz)   08/30/17 92.5 kg (204 lb)              Today, you had the following     No orders found for display       Primary Care Provider Office Phone # Fax #    Brittany Theresa Penaloza [phone]       [address] [place] [address]        Equal Access to Services     VAMSHI DODSON : Hadii candido perezo Soivory, waaxda luqadaha, qaybta kaalmada adeegyada, rich wright . So [facility] [place] [phone].    ATENCIÓN: Si habla español, tiene a anne disposición servicios gratuitos de asistencia lingüística. Llame al [phone].    We comply with applicable federal civil rights laws and [place] laws. We do not discriminate on the basis of race, color, national origin, age, disability  sex, sexual orientation or gender identity.            Thank you!     Thank you for choosing PSYCHIATRY CLINIC  for your care. Our goal is always to provide you with excellent care. Hearing back from our patients is one way we can continue to improve our services. Please take a few minutes to complete the written survey that you may receive in the mail after your visit with us. Thank you!             Your Updated Medication List - Protect others around you: Learn how to safely use, store and throw away your medicines at www.disposemymeds.org.          This list is accurate as of: 9/19/17 11:59 PM.  Always use your most recent med list.                   Brand Name Dispense Instructions for use Diagnosis    cetirizine 10 MG tablet    zyrTEC     Take 10 mg by mouth        desvenlafaxine succinate 25 MG 24 hr tablet    PRISTIQ    30 tablet    Take 1 tablet (25 mg) by mouth daily    WILLIS (generalized anxiety disorder)       fluticasone 50 MCG/ACT spray    FLONASE    1 Bottle    Spray 1 spray into both nostrils daily    Acute seasonal allergic rhinitis due to pollen       LORazepam 1 MG tablet    ATIVAN    60 tablet    Take 1 tablet (1 mg) by mouth 2 times daily    WILLIS (generalized anxiety disorder)       pseudoePHEDrine 30 MG tablet    SUDAFED    28 tablet    Take 2 tablets every 4-6 hr    Acute seasonal allergic rhinitis due to pollen       triamcinolone 0.1 % ointment    KENALOG    80 g    Apply topically 2 times daily Avoid face, underarms, groin    Other atopic dermatitis       VITAMIN D PO      Take by mouth daily        vortioxetine 5 MG tablet    TRINTELLIX/BRINTELLIX    30 tablet    Take one half tablet (2.5mg) per day for 7 days then increase to 1 full tablet (5mg).    WILLIS (generalized anxiety disorder)

## 2017-09-19 NOTE — PATIENT INSTRUCTIONS
-start Pristiq on Thursday evening, take 25mg daily until you're tolerating the side effects for 1 week then increase to 50mg daily  -return to clinic in 3 weeks or sooner if needed        SAFETY PLAN    Completed on 9/19/17 at which time the following was reported: suicidal ideation without plan; without intent [details in Interim History].  It was determined that ongoing outpatient care was appropriate in addition to establishing the following care plan:     1. Triggers which can possibly cause thoughts of self-harm:   Thoughts- being hard on self, not liking self and not liking situation he is in  Mood or behavior- increased depression  Situation-increase in stressful situations       2. Coping strategies:  ice pack on face- reviewed  paced breathing- reviewed  progressive muscle relaxation- reviewed  intense exercise- reviewed  other- trying to go to sleep, distraction techniques--music, Internet, video games     3. Ways to increase safety:  -no access to weapons    4. People I can call for help: (friends, family and/or professionals)    Name: Parents    Phone: has numbers in his phone    Name: She Rivera DO              Phone: [facility] [phone] (clinic)                                                                                                 [phone] (after hours)      CRISIS MOBILE TEAMS  [facility] -COPE: 24hrs/7days:    [phone]  (Adults > 19yo)    [phone]  (Child   < 16yo)                                       FRONT DOOR (during the day could call)   [phone]    [facility] -[phone] (Adult)  [phone] (Child)     [facility] -[phone] (Adult and Child)     [facility] -[phone] (Adult and Child)     [facility] -[phone] (Adult and Child)     [place] EMERGENCY NUMBERS      Crisis Connection:                                [phone]    [facility]:     [phone]    Crisis  Intervention:                                [phone] or [phone]     COPE: [place] Team 24hrs/7days:    [phone]  (Adults > 19yo)                                                                            [phone]  (Child   < 16yo)  [facility]        [phone] 24/7 line and [place] Team   [address] [place] [address]  DROP IN:  M-F: 8:00 am   7:00 pm  Sat: 11:00 am - 3:00 pm   Sun: Closed    [address]     [phone]    WALK-IN COUNSELING:  [facility]       [phone]    [address]:   M, W, F:  1:00-3:00PM    M-Th:  6:30-8:30PM

## 2017-09-19 NOTE — PROGRESS NOTES
"  PSYCHIATRY CLINIC PROGRESS NOTE   The initial diagnostic evaluation was on 2/13/17 and the last transfer of care evaluation was 7/11/17.    Pertinent Background:  This patient first experienced mental health issues in childhood where he saw a therapist in grade school and has received treatment for depression and anxiety.  See transfer evaluation for detailed history.  Notably, depression present in childhood along with a low level of anxiety, first noticed an increase in anxiety around May 2011 after graduation from college and moving in with parents for 2 months in the fall of that year. Oct 2011 he had first \"panic attack\" while at work in context of overuse of caffeine to help aid a hanover from a previous night of drinking EtOH. Since 2011 has had a high amount of \"fear and worry\" which is prominent when leaving his home and exacerbated by feeling like he is trapped and does not have an escape route. Hx of passive SI. Has experienced side effects (\"brain on fire\") to every medication tried aside from mirtazapine which was stopped 2/2 weight gain.      Psych critical item history includes suicidal ideation.    INTERIM HISTORY                                                 Hal Woo is a 27 year old male who was last seen in clinic on 8/22/17 at which time it was planned for him to stop sertraline and start vilazodone, however due to insurance coverage, pt started on desvenlafaxine instead.  The patient reports adequate treatment adherence in regards to his lorazepam, took 2 doses of desvenlafaxine and then stopped.  History was provided by the patient who was a good historian.  Since the last visit:  -started desvenlafaxine on Friday, woke up at 2am feeling anxious, same thing happened Saturday night, had a difficult weekend, felt tired and lazy on Sunday, did not leave the apartment building, had increased anxiety throughout the day, therefore he elected to stop taking his desvenlafaxine due to " "having a lot of work and school obligations this week and he was fearful that he would not be functioning well if having side effects from medication  -plans on restarting this on Thursday of this week and powering through for at least 2 weeks as his side effects were not significant and he believes he may be able to tolerate them, having increased anxiety regarding insurance company's unwillingness to cover some medications  -did have some passive suicidal thoughts over the last few weeks after stopping the sertraline, felt his mood also became more depressed, denies any plan or intent and states he could never do that to his parents  -having increase in other stressors: financially things are difficult with being in school and working and also living alone, has been more isolated as the friends he was previously hanging around with have all dispersed for different jobs/schooling, also having some stress with his family as his mother is struggling with some physical health issues and needing to find new employment  -still able to get some physical activity which he finds helpful, also continues to engage in therapy however found last interaction to be unhelpful but is willing to go back and see therapist again in the near future  -he does recognize that if his mood or symptoms were \"to get really bad\" there are always options such as cutting back on school for a semester which he finds is a comfort    RECENT SYMPTOMS:   DEPRESSION:  reports-suicidal ideation without plan; without intent [details in Interim History], depressed mood, anhedonia, low energy, insomnia , psychomotor change observed by others (slowing) and poor concentration /memory;  DENIES- weight/ appetite change (increase or decrease) and excessive guilt  DEVAUGHN/HYPOMANIA:  reports-none;  DENIES- increased energy, decreased sleep need, increased activity and grandiosity  PSYCHOSIS:  reports-none;  DENIES- delusions, auditory hallucinations and visual " hallucinations  DYSREGULATION:  reports-suicidal ideation without plan; without intent [details in Interim History] and none;  DENIES- violent ideation and SIB  PANIC ATTACK:  none   ANXIETY:  excessive worry, obsessions [decreasing-still continue to be present surrounding medical issues] and nervous/tense/restless/overwhelmed  TRAUMA RELATED:  none  COMPULSIVE:  none  SLEEP:  no significant change     EATING DISORDER: none    RECENT SUBSTANCE USE:     ALCOHOL- rare, no use since last appointment           TOBACCO- none            CAFFEINE- limited intake 2/2 anxiety               CANNABIS- none  OPIOIDS- none            NARCAN KIT- N/A       OTHER ILLICIT DRUGS- none     CURRENT SOCIAL HISTORY:  FINANCIAL SUPPORT- working       CHILDREN- none       LIVING SITUATION- lives alone in apartment      SOCIAL/ SPIRITUAL SUPPORT- friends, family, therapist      FEELS SAFE AT HOME- Yes     MEDICAL ROS:  Reports none.  Denies GI [nausea, diarrhea, constipation], weight changes [increase, decrease], headache, sedation, tremor, confusion, excessive diaphoresis, muscle twitches, bruxism, shiver and easy bruising  and withdrawal symptoms.    PSYCH and CD Critical Summary Points since July 2017 July-August: Tried 2 trials of increasing sertraline to 37.5mg with increased anxiety and anger within 24 hours; obtained [facility] [place] testing  September: Attempted to start vilazodone-no covered by insurance and appeal denied; started desvenalfaxine 25mg     PAST PSYCH MED TRIALS   see EMR Problem List: Hx of psychiatric care    MEDICAL / SURGICAL HISTORY                                   CARE TEAM:    PCP- Dr. Brittany Penaloza    Therapist- ARMANDO Bearden MPH LICSW    Patient Active Problem List   Diagnosis     Anxiety state     Depression     Contact dermatitis     Disturbance in sleep behavior     Generalized anxiety disorder     Insomnia     Panic disorder     Panic disorder with agoraphobia     Vocal cord dysfunction      Vitamin D deficiency     Hx of psychiatric care     Common wart       ALLERGY                                Pineapple and Seasonal allergies  MEDICATIONS                               Current Outpatient Prescriptions   Medication Sig Dispense Refill     LORazepam (ATIVAN) 1 MG tablet Take 1 tablet (1 mg) by mouth 2 times daily 60 tablet 0     desvenlafaxine succinate (PRISTIQ) 25 MG 24 hr tablet Take 1 tablet (25 mg) by mouth daily 30 tablet 0     vortioxetine (TRINTELLIX/BRINTELLIX) 5 MG tablet Take one half tablet (2.5mg) per day for 7 days then increase to 1 full tablet (5mg). 30 tablet 0     fluticasone (FLONASE) 50 MCG/ACT spray Spray 1 spray into both nostrils daily 1 Bottle 3     pseudoePHEDrine (SUDAFED) 30 MG tablet Take 2 tablets every 4-6 hr 28 tablet 1     cetirizine (ZYRTEC) 10 MG tablet Take 10 mg by mouth       triamcinolone (KENALOG) 0.1 % ointment Apply topically 2 times daily Avoid face, underarms, groin 80 g 3     Cholecalciferol (VITAMIN D PO) Take by mouth daily       VITALS   /72  Pulse 73  Wt 93.6 kg (206 lb 6.4 oz)  BMI 33.31 kg/m2   MENTAL STATUS EXAM                                                           Alertness: alert  and oriented  Appearance: well groomed and appropriately dressed for weather  Behavior/Demeanor: cooperative and pleasant, with good  eye contact   Speech: normal  Language: intact  Psychomotor: less fidgety  Mood: depressed and anxious  Affect: full range; was congruent to mood; was congruent to content  Thought Process/Associations: unremarkable  Thought Content:  Reports suicidal ideation without plan; without intent [details in Interim History];  Denies violent ideation and delusions  Perception:  Reports none;  Denies auditory hallucinations and visual hallucinations  Insight: fair   Judgment: adequate for safety  Cognition: does  appear grossly intact; formal cognitive testing was not done    LABS and DATA     RATING SCALES:  none    PHQ9 TODAY =  "13  PHQ-9 SCORE 8/8/2017 8/22/2017 9/12/2017   Total Score 7 8 13     DIAGNOSIS     WILLIS with agoraphobia  Major Depression Disorder, single episode, moderate     ASSESSMENT                                     TODAY Subhash reports worsening of his mood and anxiety since stopping the sertraline in preparation of starting a different antidepressant that he would likely tolerate given results of recent genesight testing. Attempted to start vilazodone, however not covered by insurance, Trintellix was also not covered, was able to start desvenlafaxine and took 2 doses but discontinued this due to perceived side effect of feeling more anxious and having difficulty with sleep. Again discussed the goal of wanting to reach a therapeutic dose of SSRI/SNRI as a way to target his anxiety and then taper his Ativan for long term management of his symptoms, he is wanting to achieve this before his planned trip to [place] at the end of December and has elected to \"power through\" the side effects and take 25mg of desvenlafaxine for 2 weeks starting on Thursday of this week. Will be in contact with the clinic if needed, also given his recent passive suicidal ideations he completed a safety plan with writer today during appointment and expressed willingness to reach out to the clinic or other crisis services should his suicidal thoughts increase in intensity or should he have other imminent safety concerns. He identified several protective factors during his appointment including his family. He will follow up in 3 weeks or sooner if needed.               SUICIDE RISK ASSESSMENT [details described above]:  Today Hal Woo reports recent passive suicidal ideation without plan or intent.  In addition, he has notable risk factors for self-harm including worsening symptoms, severe anxiety, financial/legal stress, lives alone/ isolated and male.  However, risk is mitigated by no h/o suicide attempt, no plan or intent, no h/o risky " impulsive behavior, describes a safety plan, h/o seeking help when needed, future oriented, commitment to family, stable housing and good job situation.  Based on all available evidence he does not appear to be at imminent risk for self-harm therefore does not meet criteria for a 72-hr hold/  involuntary hospitalization.  However, based on degree of symptoms close psych FU was recommended which the pt did agree to.  Additional steps to minimize risk include: SAFETY PLAN completed 9/19/17, frequent clinic visits, anxiety/ insomnia mngmt and med changes.  Safety Plan placed in AVS: Yes.    CONTROLLED SUBSTANCE STATEMENT:  The use of Lorazepam (Ativan) is indicated and appropriate.  This regimen is both beneficial and well tolerated with no adverse effects or tolerance.  There is no evidence of abuse of this medication or other substances.  Warnings related to abuse potential, street value, adverse effects, abrupt cessation, withdrawal and need for emergent care have been discussed and are understood by the patient.  The patient has verbalized clear understanding of safety issues as well as the need to control use.  Plan to continue use at this time.   Controlled Substance Contract was not completed.    [place] PRESCRIPTION MONITORING PROGRAM [] was checked today:  indicates no abuse/misuse of controlled substances, only receiving them from this clinic with no early refills    PSYCHOTROPIC DRUG INTERACTIONS:   None.  MANAGEMENT:  N/A     PLAN                                                                                                       1) PSYCHOTROPIC MEDICATIONS:  - start desvenlafaxine 25mg daily on Thursday 9/21/17  - continue lorazepam 1mg BID     2) THERAPY:  Continue  TREATMENT PLAN   Date revised  -  8/22/17  Date next due- 11/22/17    3) NEXT DUE:    Labs- N/A  Rating Scales- N/A    4) REFERRALS:    NONE    5) RTC: 3 weeks    6) CRISIS NUMBERS:   Provided routinely in AVS.  Especially emphasized:  ONLY if  frandy FRANKEL PT: [facility] [phone] (clinic), [phone] (after hours)      TREATMENT RISK STATEMENT:  The risks, benefits, alternatives and potential adverse effects have been discussed and are understood by the patient/ patient's guardian. The pt understands the risks of using street drugs or alcohol.  There are no medical contraindications, the pt agrees to treatment with the ability to do so.  The patient understands to call 911 or come to the nearest ED if life threatening or urgent symptoms present.    PSYCHIATRY CLINIC INDIVIDUAL PSYCHOTHERAPY NOTE                                    16   Start time: 0830  End time: 0850  Subjective: This supportive psychotherapy session addressed issues related to goals of therapy.  Patient's reaction: Preparatory in the context of mental status appropriate for ambulatory setting.  Progress: ongoing just started goals  Plan: RTC 3 weeks  Psychotherapy services during this visit included  myself and Subhash.   TREATMENT  PLAN          SYMPTOMS;PROBLEMS   MEASURABLE GOALS;    FUNCTIONAL IMPROVEMENT INTERVENTIONS;    GAINS MADE DISCHARGE CRITERIA   Anxiety: nervous/tense/restless/overwhelmed   recognize and plan for top 2-3 anxiety-provoking situations  (travel) -work on developing 2 coping strategies to help him enjoy his trip  marked symptom improvement   Depression: depressed mood   improve/ maintain good physical health practices as a coping skill -go to yoga classes marked symptom improvement   RESIDENT:   She Rivera DO    Patient staffed in clinic with Dr. Brown who will sign the note.  Supervisor is Dr. Michel.  I saw the patient with the resident, and participated in key portions of the service, including the mental status examination and developing the plan of care. I reviewed key portions of the history with the resident. I agree with the findings and plan as documented in this note.    Rody Brown

## 2017-09-20 ASSESSMENT — PATIENT HEALTH QUESTIONNAIRE - PHQ9: SUM OF ALL RESPONSES TO PHQ QUESTIONS 1-9: 13

## 2017-09-21 ASSESSMENT — PATIENT HEALTH QUESTIONNAIRE - PHQ9
5. POOR APPETITE OR OVEREATING: MORE THAN HALF THE DAYS
SUM OF ALL RESPONSES TO PHQ QUESTIONS 1-9: 9

## 2017-09-21 ASSESSMENT — ANXIETY QUESTIONNAIRES
IF YOU CHECKED OFF ANY PROBLEMS ON THIS QUESTIONNAIRE, HOW DIFFICULT HAVE THESE PROBLEMS MADE IT FOR YOU TO DO YOUR WORK, TAKE CARE OF THINGS AT HOME, OR GET ALONG WITH OTHER PEOPLE: SOMEWHAT DIFFICULT
5. BEING SO RESTLESS THAT IT IS HARD TO SIT STILL: SEVERAL DAYS
6. BECOMING EASILY ANNOYED OR IRRITABLE: SEVERAL DAYS
1. FEELING NERVOUS, ANXIOUS, OR ON EDGE: SEVERAL DAYS
7. FEELING AFRAID AS IF SOMETHING AWFUL MIGHT HAPPEN: SEVERAL DAYS
2. NOT BEING ABLE TO STOP OR CONTROL WORRYING: SEVERAL DAYS
3. WORRYING TOO MUCH ABOUT DIFFERENT THINGS: SEVERAL DAYS
GAD7 TOTAL SCORE: 8

## 2017-09-22 ASSESSMENT — ANXIETY QUESTIONNAIRES: GAD7 TOTAL SCORE: 8

## 2017-10-12 ENCOUNTER — OFFICE VISIT (OUTPATIENT)
Dept: PSYCHIATRY | Facility: CLINIC | Age: 27
End: 2017-10-12
Attending: PSYCHIATRY & NEUROLOGY
Payer: COMMERCIAL

## 2017-10-12 DIAGNOSIS — F41.1 GAD (GENERALIZED ANXIETY DISORDER): ICD-10-CM

## 2017-10-12 RX ORDER — LORAZEPAM 1 MG/1
1 TABLET ORAL 2 TIMES DAILY
Qty: 60 TABLET | Refills: 1 | Status: SHIPPED | OUTPATIENT
Start: 2017-10-12 | End: 2017-12-05

## 2017-10-12 RX ORDER — DESVENLAFAXINE 25 MG/1
50 TABLET, EXTENDED RELEASE ORAL DAILY
Qty: 60 TABLET | Refills: 1 | Status: SHIPPED | OUTPATIENT
Start: 2017-10-12 | End: 2017-11-21

## 2017-10-12 NOTE — PROGRESS NOTES
"  PSYCHIATRY CLINIC PROGRESS NOTE   The initial diagnostic evaluation was on 2/13/17 and the last transfer of care evaluation was 7/11/17.    Pertinent Background:  This patient first experienced mental health issues in childhood where he saw a therapist in grade school and has received treatment for depression and anxiety.  See transfer evaluation for detailed history.  Notably, depression present in childhood along with a low level of anxiety, first noticed an increase in anxiety around May 2011 after graduation from college and moving in with parents for 2 months in the fall of that year. Oct 2011 he had first \"panic attack\" while at work in context of overuse of caffeine to help aid a hanover from a previous night of drinking EtOH. Since 2011 has had a high amount of \"fear and worry\" which is prominent when leaving his home and exacerbated by feeling like he is trapped and does not have an escape route. Hx of passive SI. Has experienced side effects (\"brain on fire\") to every medication tried aside from mirtazapine which was stopped 2/2 weight gain.      Psych critical item history includes suicidal ideation.    INTERIM HISTORY                                                 Hal Woo is a 27 year old male who was last seen in clinic on 9/19/17 at which time no changes were made and he was planning on retrial of taking desvenlafaxine.  The patient reports good treatment adherence. History was provided by the patient who was a good historian.  Since the last visit:  -started desvenlafaxine for 3 weeks and this has been going well, no noted side effects, has been feeling outgoing and social not sure how he feels about that but trying to embrace it  -thinking about increasing dose of desvenlafaxine on Monday or Thursday of next week, discussed possibly utilizing an extra half tablet of ativan if needed initially with increase  -feeling hopeful about medication and possibility of being on therapeutic dose " before his trip to [facility] and being off or on lower dose of lorazepam by this time as well  -has been able to nap, sleeping has improved, feels rested upon waking up from a nap instead of groggy as he has previously before starting medication   -has been losing weight, eating better and getting more activity  -denies suicidal ideation or having any safety concerns    RECENT SYMPTOMS:   DEPRESSION:  reports-depressed mood and low energy;  DENIES- suicidal ideation   DEVAUGHN/HYPOMANIA:  reports-none;  DENIES- increased energy, decreased sleep need, increased activity and grandiosity  PSYCHOSIS:  reports-none;  DENIES- delusions, auditory hallucinations and visual hallucinations  DYSREGULATION:  reports-none;  DENIES- suicidal ideation, violent ideation and SIB  PANIC ATTACK:  none   ANXIETY:  excessive worry, obsessions [decreasing] and nervous/tense/restless/overwhelmed  TRAUMA RELATED:  none  COMPULSIVE:  none  SLEEP:  as above    EATING DISORDER: none    RECENT SUBSTANCE USE:     ALCOHOL- rare, no use in over a month       TOBACCO- none            CAFFEINE- limited intake 2/2 anxiety               CANNABIS- none  OPIOIDS- none            NARCAN KIT- N/A       OTHER ILLICIT DRUGS- none     CURRENT SOCIAL HISTORY:  FINANCIAL SUPPORT- working       CHILDREN- none       LIVING SITUATION- lives alone in apartment      SOCIAL/ SPIRITUAL SUPPORT- friends, family, therapist      FEELS SAFE AT HOME- Yes     MEDICAL ROS:  Reports none.  Denies GI [nausea, diarrhea, constipation], weight changes [increase, decrease], headache, sedation, tremor, confusion, excessive diaphoresis, muscle twitches, bruxism, shiver and easy bruising  and withdrawal symptoms.    PSYCH and CD Critical Summary Points since July 2017 July-August: Tried 2 trials of increasing sertraline to 37.5mg with increased anxiety and anger within 24 hours; obtained [place] testing  September: Attempted to start vilazodone-no covered by insurance and appeal  denied; started desvenalfaxine 25mg     PAST PSYCH MED TRIALS   see EMR Problem List: Hx of psychiatric care    MEDICAL / SURGICAL HISTORY                                   CARE TEAM:    PCP- Dr. Brittany Penaloza    Therapist- Sofía Ramirez MSW MPH [facility]    Patient Active Problem List   Diagnosis     Anxiety state     Depression     Contact dermatitis     Disturbance in sleep behavior     Generalized anxiety disorder     Insomnia     Panic disorder     Panic disorder with agoraphobia     Vocal cord dysfunction     Vitamin D deficiency     Hx of psychiatric care     Common wart       ALLERGY                                Pineapple and Seasonal allergies  MEDICATIONS                               Current Outpatient Prescriptions   Medication Sig Dispense Refill     desvenlafaxine succinate (PRISTIQ) 25 MG 24 hr tablet Take 2 tablets (50 mg) by mouth daily 60 tablet 1     LORazepam (ATIVAN) 1 MG tablet Take 1 tablet (1 mg) by mouth 2 times daily 60 tablet 1     fluticasone (FLONASE) 50 MCG/ACT spray Spray 1 spray into both nostrils daily 1 Bottle 3     pseudoePHEDrine (SUDAFED) 30 MG tablet Take 2 tablets every 4-6 hr 28 tablet 1     cetirizine (ZYRTEC) 10 MG tablet Take 10 mg by mouth       triamcinolone (KENALOG) 0.1 % ointment Apply topically 2 times daily Avoid face, underarms, groin 80 g 3     Cholecalciferol (VITAMIN D PO) Take by mouth daily       VITALS   There were no vitals taken for this visit.   MENTAL STATUS EXAM                                                           Alertness: alert  and oriented  Appearance: well groomed and appropriately dressed for weather  Behavior/Demeanor: cooperative and pleasant, with good  eye contact   Speech: normal  Language: intact  Psychomotor: less fidgety  Mood: depressed and anxious  Affect: full range; was congruent to mood; was congruent to content  Thought Process/Associations: unremarkable  Thought Content:  Reports none;  Denies suicidal ideation, violent ideation  and delusions  Perception:  Reports none;  Denies auditory hallucinations and visual hallucinations  Insight: fair   Judgment: adequate for safety  Cognition: does  appear grossly intact; formal cognitive testing was not done    LABS and DATA     RATING SCALES:  none    PHQ9 TODAY = not completed today  PHQ-9 SCORE 9/12/2017 9/19/2017 9/21/2017   Total Score 13 13 9     DIAGNOSIS     WILLIS with agoraphobia  Major Depression Disorder, single episode, moderate     ASSESSMENT                                     TODAY Subhash reports improvement in his mood symptoms since starting the desvenlafaxine almost 3 weeks ago, he has been tolerating the 25mg dose without any noted side effects. He has noted improvement in SI, mood and anxiety, he has been able to be more socially active as well as take walks around campus which he has not been able to do before. He notes his symptoms are still not as well managed as he would like and is agreeable to increasing dose to 50mg within the next week after he completes his mid-term. Once he is at a therapeutic dose of desvenlafaxine that adequately targets his anxiety will plan to taper his Ativan and utilize AD +therapy  for long term management of his symptoms. He will follow up in 4 weeks or sooner if needed.               SUICIDE RISK ASSESSMENT [details described above]:  Today Hal Woo reports recent passive suicidal ideation without plan or intent.  In addition, he has notable risk factors for self-harm including worsening symptoms, severe anxiety, financial/legal stress, lives alone/ isolated and male.  However, risk is mitigated by no h/o suicide attempt, no plan or intent, no h/o risky impulsive behavior, describes a safety plan, h/o seeking help when needed, future oriented, commitment to family, stable housing and good job situation.  Based on all available evidence he does not appear to be at imminent risk for self-harm therefore does not meet criteria for a 72-hr  hold/  involuntary hospitalization.  However, based on degree of symptoms close psych FU was recommended which the pt did agree to.  Additional steps to minimize risk include: SAFETY PLAN completed 9/19/17, frequent clinic visits, anxiety/ insomnia mngmt and med changes.  Safety Plan placed in AVS: Yes.    CONTROLLED SUBSTANCE STATEMENT:  The use of Lorazepam (Ativan) is indicated and appropriate.  This regimen is both beneficial and well tolerated with no adverse effects or tolerance.  There is no evidence of abuse of this medication or other substances.  Warnings related to abuse potential, street value, adverse effects, abrupt cessation, withdrawal and need for emergent care have been discussed and are understood by the patient.  The patient has verbalized clear understanding of safety issues as well as the need to control use.  Plan to continue use at this time.   Controlled Substance Contract was not completed.    [place] PRESCRIPTION MONITORING PROGRAM [] was not checked today:  previously checked, brought bottles today, no evidence of misuse/abuse    PSYCHOTROPIC DRUG INTERACTIONS:   None.  MANAGEMENT:  N/A     PLAN                                                                                                       1) PSYCHOTROPIC MEDICATIONS:  - continue desvenlafaxine 25mg daily, increase to 50mg in next 7 days  - continue lorazepam 1mg BID     2) THERAPY:  Continue  TREATMENT PLAN   Date revised  -  8/22/17  Date next due- 11/22/17    3) NEXT DUE:    Labs- N/A  Rating Scales- N/A    4) REFERRALS:    NONE    5) RTC: 4 weeks    6) CRISIS NUMBERS:   Provided routinely in AVS.  Especially emphasized:  ONLY if a [facility]: [facility] [place] [phone] (clinic), [phone] (after hours)      TREATMENT RISK STATEMENT:  The risks, benefits, alternatives and potential adverse effects have been discussed and are understood by the patient/ patient's guardian. The pt understands the risks of using street drugs or  alcohol.  There are no medical contraindications, the pt agrees to treatment with the ability to do so.  The patient understands to call 911 or come to the nearest ED if life threatening or urgent symptoms present.    PSYCHIATRY CLINIC INDIVIDUAL PSYCHOTHERAPY NOTE                                    16   Start time: 0900  End time: 0920  Subjective: This supportive psychotherapy session addressed issues related to goals of therapy.  Patient's reaction: Preparatory in the context of mental status appropriate for ambulatory setting.  Progress: fair  Plan: RTC 4 weeks  Psychotherapy services during this visit included  myself and Subhash.   TREATMENT  PLAN          SYMPTOMS;PROBLEMS   MEASURABLE GOALS;    FUNCTIONAL IMPROVEMENT INTERVENTIONS;    GAINS MADE DISCHARGE CRITERIA   Anxiety: nervous/tense/restless/overwhelmed   recognize and plan for top 2-3 anxiety-provoking situations  (travel) -continues to work on developing 2 coping strategies to help him enjoy his trip outside of medication management  marked symptom improvement   Depression: depressed mood   improve/ maintain good physical health practices as a coping skill -go to yoga classes, has not done this recently getting activity through walking/biking marked symptom improvement   RESIDENT:   She Rivera, DO    Patient not staffed in clinic, note will be signed by supervisor Dr. Michel.  I did not see this patient directly. I have reviewed the documentation and I agree with the resident's plan of care.     Alesia Michel MD

## 2017-10-12 NOTE — PATIENT INSTRUCTIONS
-continue current medications, with increasing Pristiq on Monday or Thursday of next week to 50mg   -return to clinic 4 weeks or sooner if needed

## 2017-10-12 NOTE — MR AVS SNAPSHOT
After Visit Summary   10/12/2017    Hal Woo    MRN: [id number]           Patient Information     Date Of Birth          1990        Visit Information        Provider Department      10/12/2017 8:35 AM She Rivera MD Psychiatry Clinic        Today's Diagnoses     WILLIS (generalized anxiety disorder)          Care Instructions    -continue current medications, with increasing Pristiq on Monday or Thursday of next week to 50mg   -return to clinic 4 weeks or sooner if needed          Follow-ups after your visit        Follow-up notes from your care team     Return in about 4 weeks (around 11/9/2017) for 30 MFU.      Your next 10 appointments already scheduled     Nov 07, 2017  8:35 AM CST   Adult Med Follow UP with She Rivera MD   Psychiatry Clinic ([facility])    [address]  [address] [facility] [address] [phone]              Who to contact     Please call your clinic at [phone] to:    Ask questions about your health    Make or cancel appointments    Discuss your medicines    Learn about your test results    Speak to your doctor   If you have compliments or concerns about an experience at your clinic, or if you wish to file a complaint, please contact [facility] Patient Relations at [phone] or email us at [email or web address]         Additional Information About Your Visit        MyChart Information     [employer] gives you secure access to your electronic health record. If you see a primary care provider, you can also send messages to your care team and make appointments. If you have questions, please call your primary care clinic.  If you do not have a primary care provider, please call [phone] and they will assist you.      [employer] is an electronic gateway that provides easy, online access to your medical records. With [employer], you can request a clinic appointment, read  your test results, renew a prescription or communicate with your care team.     To access your existing account, please contact your [facility] or call [phone] for assistance.        Care EveryWhere ID     This is your Care EveryWhere ID. This could be used by other organizations to access your [place] medical records  [id number]         Blood Pressure from Last 3 Encounters:   09/19/17 111/72   09/12/17 122/72   08/30/17 114/77    Weight from Last 3 Encounters:   09/19/17 93.6 kg (206 lb 6.4 oz)   09/12/17 92.4 kg (203 lb 13 oz)   08/30/17 92.5 kg (204 lb)              Today, you had the following     No orders found for display         Today's Medication Changes          These changes are accurate as of: 10/12/17 11:59 PM.  If you have any questions, ask your nurse or doctor.               These medicines have changed or have updated prescriptions.        Dose/Directions    desvenlafaxine succinate 25 MG 24 hr tablet   Commonly known as:  PRISTIQ   This may have changed:  how much to take   Used for:  WILLIS (generalized anxiety disorder)        Dose:  50 mg   Take 2 tablets (50 mg) by mouth daily   Quantity:  60 tablet   Refills:  1            Where to get your medicines      These medications [id number] [address] [address]     Phone:  [phone]     desvenlafaxine succinate 25 MG 24 hr tablet         Some of these will need a paper prescription and others can be bought over the counter.  Ask your nurse if you have questions.     Bring a paper prescription for each of these medications     LORazepam 1 MG tablet                Primary Care Provider Office Phone # Fax #    Brittany Penaloza [phone] [address] [place] [address]        Equal Access to Services     VAMSHI DODSON AH: elena Wilhelm qaybta  rich oteroamber wright ah. [facility] [phone].    ATENCIÓN: Si tamia farley, tiene a anne disposición servicios gratuitos de asistencia lingüística. Ede al [phone].    We comply with applicable federal civil rights laws and [place] laws. We do not discriminate on the basis of race, color, national origin, age, disability, sex, sexual orientation, or gender identity.            Thank you!     Thank you for choosing PSYCHIATRY CLINIC  for your care. Our goal is always to provide you with excellent care. Hearing back from our patients is one way we can continue to improve our services. Please take a few minutes to complete the written survey that you may receive in the mail after your visit with us. Thank you!             Your Updated Medication List - Protect others around you: Learn how to safely use, store and throw away your medicines at www.disposemymeds.org.          This list is accurate as of: 10/12/17 11:59 PM.  Always use your most recent med list.                   Brand Name Dispense Instructions for use Diagnosis    cetirizine 10 MG tablet    zyrTEC     Take 10 mg by mouth        desvenlafaxine succinate 25 MG 24 hr tablet    PRISTIQ    60 tablet    Take 2 tablets (50 mg) by mouth daily    WILLIS (generalized anxiety disorder)       fluticasone 50 MCG/ACT spray    FLONASE    1 Bottle    Spray 1 spray into both nostrils daily    Acute seasonal allergic rhinitis due to pollen       LORazepam 1 MG tablet    ATIVAN    60 tablet    Take 1 tablet (1 mg) by mouth 2 times daily    WILLIS (generalized anxiety disorder)       pseudoePHEDrine 30 MG tablet    SUDAFED    28 tablet    Take 2 tablets every 4-6 hr    Acute seasonal allergic rhinitis due to pollen       triamcinolone 0.1 % ointment    KENALOG    80 g    Apply topically 2 times daily Avoid face, underarms, groin    Other atopic dermatitis       VITAMIN D PO      Take by mouth daily

## 2017-11-07 ENCOUNTER — OFFICE VISIT (OUTPATIENT)
Dept: PSYCHIATRY | Facility: CLINIC | Age: 27
End: 2017-11-07
Attending: PSYCHIATRY & NEUROLOGY
Payer: COMMERCIAL

## 2017-11-07 VITALS
DIASTOLIC BLOOD PRESSURE: 80 MMHG | WEIGHT: 207.4 LBS | SYSTOLIC BLOOD PRESSURE: 122 MMHG | BODY MASS INDEX: 33.48 KG/M2 | HEART RATE: 67 BPM

## 2017-11-07 DIAGNOSIS — F41.1 GAD (GENERALIZED ANXIETY DISORDER): Primary | ICD-10-CM

## 2017-11-07 PROCEDURE — 99212 OFFICE O/P EST SF 10 MIN: CPT | Mod: ZF

## 2017-11-07 ASSESSMENT — PATIENT HEALTH QUESTIONNAIRE - PHQ9: SUM OF ALL RESPONSES TO PHQ QUESTIONS 1-9: 11

## 2017-11-07 NOTE — PATIENT INSTRUCTIONS
-continue current medications, with plan to possibly increase desvenlafaxine to 75mg around Thanksgiving if still noticing no significant improvement in symptoms, will communicate through [employer] regarding this  -return to clinic in 4 weeks or sooner if needed

## 2017-11-07 NOTE — MR AVS SNAPSHOT
After Visit Summary   11/7/2017    Hal Woo    MRN: [id number]           Patient Information     Date Of Birth          1990        Visit Information        Provider Department      11/7/2017 8:35 AM She Rivera MD Psychiatry Clinic        Today's Diagnoses     WILLIS (generalized anxiety disorder)    -  1      Care Instructions    -continue current medications, with plan to possibly increase desvenlafaxine to 75mg around Thanksgiving if still noticing no significant improvement in symptoms, will communicate through [employer] regarding this  -return to clinic in 4 weeks or sooner if needed              Follow-ups after your visit        Follow-up notes from your care team     Return in about 4 weeks (around 12/5/2017) for 30 MFU.      Your next 10 appointments already scheduled     Dec 05, 2017  8:25 AM CST   Adult Med Follow UP with She Rivera MD   Psychiatry Clinic ([facility])    [address] [facility] [id number] [phone]              Who to contact     Please call your clinic at [phone] to:    Ask questions about your health    Make or cancel appointments    Discuss your medicines    Learn about your test results    Speak to your doctor   If you have compliments or concerns about an experience at your clinic, or if you wish to file a complaint, please contact [facility] Patient Relations at [phone] or email us at [email or web address]         Additional Information About Your Visit        MyChart Information     [employer] gives you secure access to your electronic health record. If you see a primary care provider, you can also send messages to your care team and make appointments. If you have questions, please call your primary care clinic.  If you do not have a primary care provider, please call [phone] and they will assist you.      [employer] is an electronic  gateway that provides easy, online access to your medical records. With [employer], you can request a clinic appointment, read your test results, renew a prescription or communicate with your care team.     To access your existing account, please contact your [facility] or call [phone] for assistance.        Care EveryWhere ID     This is your Care EveryWhere ID. This could be used by other organizations to access your [place] medical records  [id number]        Your Vitals Were     Pulse BMI (Body Mass Index)                67 33.48 kg/m2           Blood Pressure from Last 3 Encounters:   11/07/17 122/80   09/19/17 111/72   09/12/17 122/72    Weight from Last 3 Encounters:   11/07/17 94.1 kg (207 lb 6.4 oz)   09/19/17 93.6 kg (206 lb 6.4 oz)   09/12/17 92.4 kg (203 lb 13 oz)              Today, you had the following     No orders found for display       Primary Care Provider Office Phone # Fax #    Brittany Penaloza [phone]       [address] [place] [address]        Equal Access to Services     ROBERT [facility] : Hadii candido ku hadvincento Soivory, waaxda luqadaha, qaybta kaalmada iker, rich wright . So [facility] [phone].    ATENCIÓN: Si habla español, tiene a anne disposición servicios gratuitos de asistencia lingüística. [facility] [phone].    We comply with applicable federal civil rights laws and [place] laws. We do not discriminate on the basis of race, color, national origin, age, disability, sex, sexual orientation, or gender identity.            Thank you!     Thank you for choosing PSYCHIATRY CLINIC  for your care. Our goal is always to provide you with excellent care. Hearing back from our patients is one way we can continue to improve our services. Please take a few minutes to complete the written survey that you may receive in the mail after your visit with us. Thank you!             Your Updated  Medication List - Protect others around you: Learn how to safely use, store and throw away your medicines at www.disposemymeds.org.          This list is accurate as of: 11/7/17 11:59 PM.  Always use your most recent med list.                   Brand Name Dispense Instructions for use Diagnosis    cetirizine 10 MG tablet    zyrTEC     Take 10 mg by mouth        desvenlafaxine succinate 25 MG 24 hr tablet    PRISTIQ    60 tablet    Take 2 tablets (50 mg) by mouth daily    WILLIS (generalized anxiety disorder)       fluticasone 50 MCG/ACT spray    FLONASE    1 Bottle    Spray 1 spray into both nostrils daily    Acute seasonal allergic rhinitis due to pollen       LORazepam 1 MG tablet    ATIVAN    60 tablet    Take 1 tablet (1 mg) by mouth 2 times daily    WILLIS (generalized anxiety disorder)       pseudoePHEDrine 30 MG tablet    SUDAFED    28 tablet    Take 2 tablets every 4-6 hr    Acute seasonal allergic rhinitis due to pollen       triamcinolone 0.1 % ointment    KENALOG    80 g    Apply topically 2 times daily Avoid face, underarms, groin    Other atopic dermatitis       VITAMIN D PO      Take by mouth daily

## 2017-11-07 NOTE — PROGRESS NOTES
"  PSYCHIATRY CLINIC PROGRESS NOTE   The initial diagnostic evaluation was on 2/13/17 and the last transfer of care evaluation was 7/11/17.    Pertinent Background:  This patient first experienced mental health issues in childhood where he saw a therapist in grade school and has received treatment for depression and anxiety.  See transfer evaluation for detailed history.  Notably, depression present in childhood along with a low level of anxiety, first noticed an increase in anxiety around May 2011 after graduation from college and moving in with parents for 2 months in the fall of that year. Oct 2011 he had first \"panic attack\" while at work in context of overuse of caffeine to help aid a hanover from a previous night of drinking EtOH. Since 2011 has had a high amount of \"fear and worry\" which is prominent when leaving his home and exacerbated by feeling like he is trapped and does not have an escape route. Hx of passive SI. Has experienced side effects (\"brain on fire\") to every medication tried aside from mirtazapine which was stopped 2/2 weight gain.      Psych critical item history includes suicidal ideation.    INTERIM HISTORY                                                 Hal Woo is a 27 year old male who was last seen in clinic on 10/12/17 at which time no changes were made and he was planning on increasing dose of desvenlafaxine within the next week.  The patient reports good treatment adherence. History was provided by the patient who was a good historian.  Since the last visit:  -has been on 50mg of desvenlafaxine for almost 3 weeks, unsure of results with medication, thinks that maybe he has been beneficial for his anxiety but is unsure, looking back over 2 months does think anxiety is slightly better  -has not had any panic attacks  -is tolerated small amounts of caffeine  -did have difficulty with sleep with initially dose change, but now sleep has been regulated, feeling more tired  -the " "past 2 months have been very difficult in regards to balancing work and school and this could be contributed to decreased energy  -has been able to identify when he has been starting to have obsessive thoughts and this keeps him from \"going down that wormhole\"   -therapy has been more beneficial recently, sometimes feels his level of stress can keep him from being engaged with therapy but this has not been as prominent recently  -school and work have been manageable despite stressors  -lorazepam still makes him tired when he takes it and possibly more anxious but then this improves  -getting around 8 hours of sleep      RECENT SYMPTOMS:   DEPRESSION:  reports-depressed mood, anhedonia, low energy, insomnia , weight/ appetite change (believes weight has been decreasing), psychomotor change observed by others (fidgeting) and poor concentration /memory;  DENIES- suicidal ideation  and excessive guilt  DEVAUGHN/HYPOMANIA:  reports-none;  DENIES- increased energy, decreased sleep need, increased activity and grandiosity  PSYCHOSIS:  reports-none;  DENIES- delusions, auditory hallucinations and visual hallucinations  DYSREGULATION:  reports-none;  DENIES- suicidal ideation, violent ideation and SIB  PANIC ATTACK:  none   ANXIETY:  excessive worry, obsessions [decreasing] and nervous/tense/restless/overwhelmed  TRAUMA RELATED:  none  COMPULSIVE:  none  SLEEP:  as above    EATING DISORDER: none    RECENT SUBSTANCE USE:     ALCOHOL- rare, no use in over a month       TOBACCO- none            CAFFEINE- limited intake 2/2 anxiety               CANNABIS- none  OPIOIDS- none            NARCAN KIT- N/A       OTHER ILLICIT DRUGS- none     CURRENT SOCIAL HISTORY:  FINANCIAL SUPPORT- working       CHILDREN- none       LIVING SITUATION- lives alone in apartment      SOCIAL/ SPIRITUAL SUPPORT- friends, family, therapist      FEELS SAFE AT HOME- Yes     MEDICAL ROS:  Reports none.  Denies GI [nausea, diarrhea, constipation], weight " changes [increase, decrease], headache, sedation, tremor, confusion, excessive diaphoresis, muscle twitches, bruxism, shiver and easy bruising  and withdrawal symptoms.    PSYCH and CD Critical Summary Points since July 2017 July-August: Tried 2 trials of increasing sertraline to 37.5mg with increased anxiety and anger within 24 hours; obtained genesight testing  September: Attempted to start vilazodone-no covered by insurance and appeal denied; started desvenalfaxine 25mg     PAST PSYCH MED TRIALS   see EMR Problem List: Hx of psychiatric care    MEDICAL / SURGICAL HISTORY                                   CARE TEAM:    PCP- Dr. Brittany Penaloza    Therapist- Sofía Ramirez, MSW MPH LICSW    Patient Active Problem List   Diagnosis     Anxiety state     Depression     Contact dermatitis     Disturbance in sleep behavior     Generalized anxiety disorder     Insomnia     Panic disorder     Panic disorder with agoraphobia     Vocal cord dysfunction     Vitamin D deficiency     Hx of psychiatric care     Common wart       ALLERGY                                Pineapple and Seasonal allergies  MEDICATIONS                               Current Outpatient Prescriptions   Medication Sig Dispense Refill     desvenlafaxine succinate (PRISTIQ) 25 MG 24 hr tablet Take 2 tablets (50 mg) by mouth daily 60 tablet 1     LORazepam (ATIVAN) 1 MG tablet Take 1 tablet (1 mg) by mouth 2 times daily 60 tablet 1     fluticasone (FLONASE) 50 MCG/ACT spray Spray 1 spray into both nostrils daily 1 Bottle 3     pseudoePHEDrine (SUDAFED) 30 MG tablet Take 2 tablets every 4-6 hr 28 tablet 1     cetirizine (ZYRTEC) 10 MG tablet Take 10 mg by mouth       triamcinolone (KENALOG) 0.1 % ointment Apply topically 2 times daily Avoid face, underarms, groin 80 g 3     Cholecalciferol (VITAMIN D PO) Take by mouth daily       VITALS   /80  Pulse 67  Wt 94.1 kg (207 lb 6.4 oz)  BMI 33.48 kg/m2   MENTAL STATUS EXAM                                                            Alertness: alert  and oriented  Appearance: well groomed and appropriately dressed for weather  Behavior/Demeanor: cooperative and pleasant, with good  eye contact   Speech: normal  Language: intact  Psychomotor: less fidgety  Mood: depressed and anxious  Affect: full range; was congruent to mood; was congruent to content  Thought Process/Associations: unremarkable  Thought Content:  Reports none;  Denies suicidal ideation, violent ideation and delusions  Perception:  Reports none;  Denies auditory hallucinations and visual hallucinations  Insight: fair   Judgment: adequate for safety  Cognition: does  appear grossly intact; formal cognitive testing was not done    LABS and DATA     RATING SCALES:  none    PHQ9 TODAY = 11  PHQ-9 SCORE 9/12/2017 9/19/2017 9/21/2017   Total Score 13 13 9     DIAGNOSIS     WILLIS with agoraphobia (improving)  Major Depression Disorder, single episode, moderate     ASSESSMENT                                     TODAY Subhash reports stability in mood symptoms, has been tolerating the 50mg dose of desvenlafaxine without incident, this is the first therapeutic dose of antidepressant he has reached and tolerated over the past year. He has difficulty noting in improvement but as he discusses his recent activities he is able to reflect on engaging in activites he didn't previously engage in or the ability to handle highly stressful situations more easily. He is considering increasing the dose further to 75mg around the Thanksgiving holiday as he feels his symptoms are not fully managed, but he would like to wait until he has a break in school/work. In the near future will consider tapering his Ativan and utilize AD +therapy  for long term management of his symptoms. [facility] to discuss dose increase prior to the holiday and in clinic in 4 weeks or sooner if needed.               SUICIDE RISK ASSESSMENT [details described above]:   Today Hal Woo reports recent passive suicidal ideation without plan or intent.  In addition, he has notable risk factors for self-harm including worsening symptoms, severe anxiety, financial/legal stress, lives alone/ isolated and male.  However, risk is mitigated by no h/o suicide attempt, no plan or intent, no h/o risky impulsive behavior, describes a safety plan, h/o seeking help when needed, future oriented, commitment to family, stable housing and good job situation.  Based on all available evidence he does not appear to be at imminent risk for self-harm therefore does not meet criteria for a 72-hr hold/  involuntary hospitalization.  However, based on degree of symptoms close psych FU was recommended which the pt did agree to.  Additional steps to minimize risk include: SAFETY PLAN completed 9/19/17, frequent clinic visits, anxiety/ insomnia mngmt and med changes.  Safety Plan placed in AVS: Yes.    CONTROLLED SUBSTANCE STATEMENT:  The use of Lorazepam (Ativan) is indicated and appropriate.  This regimen is both beneficial and well tolerated with no adverse effects or tolerance.  There is no evidence of abuse of this medication or other substances.  Warnings related to abuse potential, street value, adverse effects, abrupt cessation, withdrawal and need for emergent care have been discussed and are understood by the patient.  The patient has verbalized clear understanding of safety issues as well as the need to control use.  Plan to continue use at this time.   Controlled Substance Contract was not completed.    [place] PRESCRIPTION MONITORING PROGRAM [] was not checked today:  previously checked, brought bottles today, no evidence of misuse/abuse    PSYCHOTROPIC DRUG INTERACTIONS:   None.  MANAGEMENT:  N/A     PLAN                                                                                                       1) PSYCHOTROPIC MEDICATIONS:  - continue desvenlafaxine 50mg daily and may increase in  2 weeks to 75mg  - continue lorazepam 1mg BID     2) THERAPY:  Continue  TREATMENT PLAN   Date revised  -  8/22/17  Date next due- 11/22/17    3) NEXT DUE:    Labs- N/A  Rating Scales- N/A    4) REFERRALS:    NONE    5) RTC: 4 weeks    6) CRISIS NUMBERS:   Provided routinely in AVS.  Especially emphasized:  ONLY if a [facility] PT: And[facility] [phone] (clinic), [phone] (after hours)      TREATMENT RISK STATEMENT:  The risks, benefits, alternatives and potential adverse effects have been discussed and are understood by the patient/ patient's guardian. The pt understands the risks of using street drugs or alcohol.  There are no medical contraindications, the pt agrees to treatment with the ability to do so.  The patient understands to call 911 or come to the nearest ED if life threatening or urgent symptoms present.    PSYCHIATRY CLINIC INDIVIDUAL PSYCHOTHERAPY NOTE                                    16   Start time: 0850  End time: 0910  Subjective: This supportive psychotherapy session addressed issues related to goals of therapy.  Patient's reaction: Preparatory in the context of mental status appropriate for ambulatory setting.  Progress: fair  Plan: RTC 4 weeks  Psychotherapy services during this visit included  myself and Subhash.   TREATMENT  PLAN          SYMPTOMS;PROBLEMS   MEASURABLE GOALS;    FUNCTIONAL IMPROVEMENT INTERVENTIONS;    GAINS MADE DISCHARGE CRITERIA   Anxiety: nervous/tense/restless/overwhelmed   recognize and plan for top 2-3 anxiety-provoking situations  (travel) -continues to work on developing 2 coping strategies to help him enjoy his trip outside of medication management  marked symptom improvement   Depression: depressed mood   improve/ maintain good physical health practices as a coping skill -go to yoga classes, has not done this recently getting activity through walking/biking marked symptom improvement   RESIDENT:   She Rivera, DO    Patient not staffed in clinic,  note will be signed by supervisor Dr. Michle.  I did not see this patient directly. I have reviewed the documentation and I agree with the resident's plan of care.     Alesia Michel MD

## 2017-11-21 DIAGNOSIS — F41.1 GAD (GENERALIZED ANXIETY DISORDER): ICD-10-CM

## 2017-11-21 RX ORDER — DESVENLAFAXINE 25 MG/1
25 TABLET, EXTENDED RELEASE ORAL DAILY
Qty: 30 TABLET | Refills: 0 | Status: SHIPPED | OUTPATIENT
Start: 2017-11-21 | End: 2017-12-05

## 2017-11-21 RX ORDER — DESVENLAFAXINE 50 MG/1
50 TABLET, FILM COATED, EXTENDED RELEASE ORAL DAILY
Qty: 30 TABLET | Refills: 0 | Status: SHIPPED | OUTPATIENT
Start: 2017-11-21 | End: 2017-12-05

## 2017-11-21 RX ORDER — DESVENLAFAXINE 25 MG/1
75 TABLET, EXTENDED RELEASE ORAL DAILY
Qty: 90 TABLET | Refills: 0 | Status: SHIPPED | OUTPATIENT
Start: 2017-11-21 | End: 2017-11-21

## 2017-11-22 ENCOUNTER — TELEPHONE (OUTPATIENT)
Dept: PSYCHIATRY | Facility: CLINIC | Age: 27
End: 2017-11-22

## 2017-11-22 NOTE — TELEPHONE ENCOUNTER
Prior Authorization Retail Medication Request  Medication/Dose: desvenlafaxine ER 25 mg  Diagnosis and ICD code: F41.1  Generalized Anxiety Disorder; F33.40  Major depression, unspecified  New/Renewal/Insurance Change PA: New, quantity limit exception  Previously Tried and Failed Therapies: sertraline, hydroxyzine, gabapentin, mirtazapine    Insurance ID (if provided): [id number]  Insurance Phone (if provided): [phone]    Any additional info from fax request: Maximum daily dose allowed by insurance - one tablet    If you received a fax notification from an outside Pharmacy:  Pharmacy Name:[facility]  Pharmacy #:[phone]  Pharmacy Fax:[phone]    Additional info:  Patient has experienced some relief in symptoms with this medication and is being titrated to a higher dose to determine what would provide optimum relief.

## 2017-11-22 NOTE — TELEPHONE ENCOUNTER
Prior Authorization Not Needed per Insurance    Medication: desvenlafaxine ER 25 mg- PA not needed  Insurance Company: [employer] - Phone [phone] Fax [phone]  Expected CoPay: $10.00    Pharmacy Filling the Rx: [facility] #[id number] - [address]  Pharmacy Notified: Yes  Patient Notified: Yes        Per pharmacy, patient picked up medication yesterday.

## 2017-11-22 NOTE — TELEPHONE ENCOUNTER
[facility] Prior Authorization Team   Phone: [phone]  Fax: [phone]      PA Initiation    Medication: desvenlafaxine ER 25 mg- PA Inititated  Insurance Company: [employer] - Phone [phone] Fax [phone]  Pharmacy Filling the Rx: [facility] #[id number] - [address]  Filling Pharmacy Phone: [phone]  Filling Pharmacy Fax:    Start Date: 11/22/2017

## 2017-12-05 ENCOUNTER — OFFICE VISIT (OUTPATIENT)
Dept: PSYCHIATRY | Facility: CLINIC | Age: 27
End: 2017-12-05
Attending: PSYCHIATRY & NEUROLOGY
Payer: COMMERCIAL

## 2017-12-05 VITALS
WEIGHT: 204 LBS | BODY MASS INDEX: 32.93 KG/M2 | SYSTOLIC BLOOD PRESSURE: 123 MMHG | HEART RATE: 82 BPM | DIASTOLIC BLOOD PRESSURE: 80 MMHG

## 2017-12-05 DIAGNOSIS — F41.1 GAD (GENERALIZED ANXIETY DISORDER): Primary | ICD-10-CM

## 2017-12-05 PROCEDURE — 99212 OFFICE O/P EST SF 10 MIN: CPT | Mod: ZF

## 2017-12-05 RX ORDER — DESVENLAFAXINE 50 MG/1
50 TABLET, FILM COATED, EXTENDED RELEASE ORAL DAILY
Qty: 30 TABLET | Refills: 1 | Status: SHIPPED | OUTPATIENT
Start: 2017-12-05 | End: 2017-12-26

## 2017-12-05 RX ORDER — LORAZEPAM 1 MG/1
1 TABLET ORAL 2 TIMES DAILY
Qty: 60 TABLET | Refills: 1 | Status: SHIPPED | OUTPATIENT
Start: 2017-12-05 | End: 2018-02-06

## 2017-12-05 RX ORDER — DESVENLAFAXINE 25 MG/1
25 TABLET, EXTENDED RELEASE ORAL DAILY
Qty: 30 TABLET | Refills: 1 | Status: SHIPPED | OUTPATIENT
Start: 2017-12-05 | End: 2017-12-26

## 2017-12-05 ASSESSMENT — PATIENT HEALTH QUESTIONNAIRE - PHQ9: SUM OF ALL RESPONSES TO PHQ QUESTIONS 1-9: 9

## 2017-12-05 NOTE — MR AVS SNAPSHOT
After Visit Summary   12/5/2017    Hal Woo    MRN: [id number]           Patient Information     Date Of Birth          1990        Visit Information        Provider Department      12/5/2017 8:25 AM She Rivera MD Psychiatry Clinic        Today's Diagnoses     WILLIS (generalized anxiety disorder)    -  1      Care Instructions    -continue current medications, no changes  -return to clinic in 4 weeks or sooner if needed            Follow-ups after your visit        Follow-up notes from your care team     Return in about 4 weeks (around 1/2/2018) for 30 MFU.      Your next 10 appointments already scheduled     Jan 04, 2018  8:25 AM [facility]   Adult Med Follow UP with She Rivera MD   Psychiatry Clinic ([facility])    [place] [id number] [facility] [address] [phone]              Who to contact     Please call your clinic at [phone] to:    Ask questions about your health    Make or cancel appointments    Discuss your medicines    Learn about your test results    Speak to your doctor   If you have compliments or concerns about an experience at your clinic, or if you wish to file a complaint, please contact [facility] Patient Relations at [phone] or email us at [email or web address]         Additional Information About Your Visit        MyChart Information     [employer] gives you secure access to your electronic health record. If you see a primary care provider, you can also send messages to your care team and make appointments. If you have questions, please call your primary care clinic.  If you do not have a primary care provider, please call [phone] and they will assist you.      [employer] is an electronic gateway that provides easy, online access to your medical records. With [employer], you can request a clinic appointment, read your test results, renew a prescription or  communicate with your care team.     To access your existing account, please contact your [facility] or call [phone] for assistance.        Care EveryWhere ID     This is your Care EveryWhere ID. This could be used by other organizations to access your Register medical records  [id number]        Your Vitals Were     Pulse BMI (Body Mass Index)                82 32.93 kg/m2           Blood Pressure from Last 3 Encounters:   12/05/17 123/80   11/07/17 122/80   09/19/17 111/72    Weight from Last 3 Encounters:   12/05/17 92.5 kg (204 lb)   11/07/17 94.1 kg (207 lb 6.4 oz)   09/19/17 93.6 kg (206 lb 6.4 oz)              Today, you had the following     No orders found for display         Where to get your medicines      These medications were sent to [facility] #[id number] - [address] [address]     Phone:  [phone]     desvenlafaxine succinate 25 MG 24 hr tablet    desvenlafaxine succinate 50 MG 24 hr tablet         Some of these will need a paper prescription and others can be bought over the counter.  Ask your nurse if you have questions.     Bring a paper prescription for each of these medications     LORazepam 1 MG tablet          Primary Care Provider Office Phone # Fax #    Brittany Penaloza [phone]       [address] [facility] [address] [facility] [address]  [facility] [address]        Equal Access to Services     VAMSHI DODSON : Hadii candido perezo Soivory, waaxda luqadaha, qaybta kaalmada iker, rich garcía. So [facility] [phone].    ATENCIÓN: Si habla español, tiene a anne disposición servicios gratuitos de asistencia lingüística. Llame al [phone].    We comply with applicable federal civil rights laws and [place] laws. We do not discriminate on the basis of race, color, national origin, age, disability, sex, sexual orientation, or gender identity.            Thank  you!     Thank you for choosing PSYCHIATRY CLINIC  for your care. Our goal is always to provide you with excellent care. Hearing back from our patients is one way we can continue to improve our services. Please take a few minutes to complete the written survey that you may receive in the mail after your visit with us. Thank you!             Your Updated Medication List - Protect others around you: Learn how to safely use, store and throw away your medicines at www.disposemymeds.org.          This list is accurate as of: 12/5/17 11:59 PM.  Always use your most recent med list.                   Brand Name Dispense Instructions for use Diagnosis    cetirizine 10 MG tablet    zyrTEC     Take 10 mg by mouth        * desvenlafaxine succinate 25 MG 24 hr tablet    PRISTIQ    30 tablet    Take 1 tablet (25 mg) by mouth daily Along with 1 tablet of 50mg for total daily dose of 75mg.    WILLIS (generalized anxiety disorder)       * desvenlafaxine succinate 50 MG 24 hr tablet    PRISTIQ    30 tablet    Take 1 tablet (50 mg) by mouth daily Along with 1 tablet of 25mg for total daily dose of 75mg.    WILLIS (generalized anxiety disorder)       fluticasone 50 MCG/ACT spray    FLONASE    1 Bottle    Spray 1 spray into both nostrils daily    Acute seasonal allergic rhinitis due to pollen       LORazepam 1 MG tablet    ATIVAN    60 tablet    Take 1 tablet (1 mg) by mouth 2 times daily    WILLIS (generalized anxiety disorder)       pseudoePHEDrine 30 MG tablet    SUDAFED    28 tablet    Take 2 tablets every 4-6 hr    Acute seasonal allergic rhinitis due to pollen       triamcinolone 0.1 % ointment    KENALOG    80 g    Apply topically 2 times daily Avoid face, underarms, groin    Other atopic dermatitis       VITAMIN D PO      Take by mouth daily        * Notice:  This list has 2 medication(s) that are the same as other medications prescribed for you. Read the directions carefully, and ask your doctor or other care provider to review them  with you.

## 2017-12-05 NOTE — PROGRESS NOTES
"  PSYCHIATRY CLINIC PROGRESS NOTE   The initial diagnostic evaluation was on 2/13/17 and the last transfer of care evaluation was 7/11/17.    Pertinent Background:  This patient first experienced mental health issues in childhood where he saw a therapist in grade school and has received treatment for depression and anxiety.  See transfer evaluation for detailed history.  Notably, depression present in childhood along with a low level of anxiety, first noticed an increase in anxiety around May 2011 after graduation from college and moving in with parents for 2 months in the fall of that year. Oct 2011 he had first \"panic attack\" while at work in context of overuse of caffeine to help aid a hanover from a previous night of drinking EtOH. Since 2011 has had a high amount of \"fear and worry\" which is prominent when leaving his home and exacerbated by feeling like he is trapped and does not have an escape route. Hx of passive SI. Has experienced side effects (\"brain on fire\") to every medication tried aside from mirtazapine which was stopped 2/2 weight gain.      Psych critical item history includes suicidal ideation.    INTERIM HISTORY                                                 Hal Woo is a 27 year old male who was last seen in clinic on 11/7/17 at which time no changes were made and he was planning on increasing dose of desvenlafaxine to 75mg around Thanksgiving break.  The patient reports good treatment adherence. History was provided by the patient who was a good historian.  Since the last visit:  -has increased to the 75mg of desvenlafaxine, did have one difficult night right away but continued with increased dose and has noticed that the side effects he was experiencing, some headache, nausea has improved  -he has, however, noticed increase in sexual side effects with decreased libido and difficulty with achieving erection with dose increase, is not currently distressed by this, will continue to " "discuss going forward as his relationship status/sexual activity may change  -had some limited increase in anxiety around changing his dose and now it has been slowly returning to baseline  -no panic attacks  -no suicidal thoughts  -continues to have some barriers to social activities but has been able to manage these and work through them: gave example of visiting a friend in [place] and had other errands to run including going to [employer]. He was able to go to the [employer] by this friends house instead of returning to the [place] location where he would feel more comfortable and less anxious  -has had some alcohol intake around the Thanksgiving holiday, again discussed the risk of alcohol use with his medications, particularly with lorazepam   -work has been less stressful lately, has less to do for the rest of the month  -has 2 weeks left of one class, does not identify major stressors with school at this time  -sleep has been changing around the time of his dose increase, he has noticed that he falls asleep around 10:30p and has been waking up around 5:00am and having difficulty getting back to sleep, but energy has been okay, will continue to monitor this, so far not distressing  -looking forward to his trip to [facility] at the end of the month, he has thought about the anxiety provoking situations he may experiencing and coping skills to use  -despite earlier goal of wanting to be off lorazepam prior to his [facility] trip, he is not wanting to \"mess with stability\" at this time until after his trip and travel has historically been difficult for him    RECENT SYMPTOMS:   DEPRESSION:  reports-depressed mood, anhedonia, low energy, insomnia , weight/ appetite change (believes weight has been decreasing), psychomotor change observed by others (fidgeting) and poor concentration /memory;  DENIES- suicidal ideation  and excessive guilt  DEVAUGHN/HYPOMANIA:  reports-none;  DENIES- increased energy, decreased sleep need, increased " activity and grandiosity  PSYCHOSIS:  reports-none;  DENIES- delusions, auditory hallucinations and visual hallucinations  DYSREGULATION:  reports-none;  DENIES- suicidal ideation, violent ideation and SIB  PANIC ATTACK:  none   ANXIETY:  excessive worry, obsessions [decreasing] and nervous/tense/restless/overwhelmed  TRAUMA RELATED:  none  COMPULSIVE:  none  SLEEP:  as above    EATING DISORDER: none    RECENT SUBSTANCE USE:     ALCOHOL- rare, had a few beers over Thanksgiving weekend      TOBACCO- none            CAFFEINE- limited intake 2/2 anxiety               CANNABIS- none  OPIOIDS- none            NARCAN KIT- N/A       OTHER ILLICIT DRUGS- none     CURRENT SOCIAL HISTORY:  FINANCIAL SUPPORT- working       CHILDREN- none       LIVING SITUATION- lives alone in apartment      SOCIAL/ SPIRITUAL SUPPORT- friends, family, therapist      FEELS SAFE AT HOME- Yes     MEDICAL ROS:  Reports sexual side effects, nausea (improving), headache (improving).  Denies diarrhea, constipation, weight changes [increase, decrease], sedation, tremor, confusion, excessive diaphoresis, muscle twitches, bruxism, shiver and easy bruising  and withdrawal symptoms.    PSYCH and CD Critical Summary Points since July 2017 July-August: Tried 2 trials of increasing sertraline to 37.5mg with increased anxiety and anger within 24 hours; obtained [email or web address] testing  September: Attempted to start vilazodone-no covered by insurance and appeal denied; started desvenalfaxine 25mg   October: Increased desvenlafaxine to 50mg daily to further target anxiety and depression  November: Increased desvenlafaxine to 75mg daily    PAST PSYCH MED TRIALS   see EMR Problem List: Hx of psychiatric care    MEDICAL / SURGICAL HISTORY                                   CARE TEAM:    PCP- Dr. Brittany Penaloza    Therapist- ARMANDO Bearden MPH LICSW    Patient Active Problem List   Diagnosis     Anxiety state     Depression     Contact dermatitis      Disturbance in sleep behavior     Generalized anxiety disorder     Insomnia     Panic disorder     Panic disorder with agoraphobia     Vocal cord dysfunction     Vitamin D deficiency     Hx of psychiatric care     Common wart       ALLERGY                                Pineapple and Seasonal allergies  MEDICATIONS                               Current Outpatient Prescriptions   Medication Sig Dispense Refill     desvenlafaxine succinate (PRISTIQ) 25 MG 24 hr tablet Take 1 tablet (25 mg) by mouth daily Along with 1 tablet of 50mg for total daily dose of 75mg. 30 tablet 0     desvenlafaxine succinate (PRISTIQ) 50 MG 24 hr tablet Take 1 tablet (50 mg) by mouth daily Along with 1 tablet of 25mg for total daily dose of 75mg. 30 tablet 0     LORazepam (ATIVAN) 1 MG tablet Take 1 tablet (1 mg) by mouth 2 times daily 60 tablet 1     fluticasone (FLONASE) 50 MCG/ACT spray Spray 1 spray into both nostrils daily 1 Bottle 3     pseudoePHEDrine (SUDAFED) 30 MG tablet Take 2 tablets every 4-6 hr 28 tablet 1     cetirizine (ZYRTEC) 10 MG tablet Take 10 mg by mouth       triamcinolone (KENALOG) 0.1 % ointment Apply topically 2 times daily Avoid face, underarms, groin 80 g 3     Cholecalciferol (VITAMIN D PO) Take by mouth daily       VITALS   /80  Pulse 82  Wt 92.5 kg (204 lb)  BMI 32.93 kg/m2   MENTAL STATUS EXAM                                                           Alertness: alert  and oriented  Appearance: well groomed and appropriately dressed for weather  Behavior/Demeanor: cooperative and pleasant, with good  eye contact   Speech: normal  Language: intact  Psychomotor:  normal or unremarkable  Mood: depressed and anxious but improving  Affect: full range; was congruent to mood; was congruent to content  Thought Process/Associations: unremarkable  Thought Content:  Reports none;  Denies suicidal ideation, violent ideation and delusions  Perception:  Reports none;  Denies auditory hallucinations and visual  hallucinations  Insight: fair   Judgment: adequate for safety  Cognition: does  appear grossly intact; formal cognitive testing was not done    LABS and DATA     RATING SCALES:  none    PHQ9 TODAY = 9  PHQ-9 SCORE 9/19/2017 9/21/2017 11/7/2017   Total Score 13 9 11     DIAGNOSIS     WILLIS with agoraphobia (improving)  Major Depression Disorder, single episode, moderate     ASSESSMENT                                     TODAY Subhash reports relative stability in mood symptoms, has been tolerating the 75mg dose of desvenlafaxine with some mild increase in anxiety around increasing dose but has started to return to his baseline that was present prior to dose increase. He has difficulty noting in improvement but as he discusses his recent activities he is able to reflect on engaging in activites he didn't previously engage in or the ability to handle highly stressful situations more easily. He has experienced a change in sleep and sexual side effects with dose increase which will continue to be monitored, he currently advocates for no change as he is wanting to prioritize manage mood and anxiety now but will continue to discuss this going forward. In the near future (likely after his trip over [place] as this is a huge stressor for him) will consider tapering his Ativan and utilize AD +therapy for long term management of his symptoms. He will follow up in 4 weeks or sooner if needed.               SUICIDE RISK ASSESSMENT [details described above]:  Today Hal Woo reports no recent suicidal ideation..  In addition, he has notable risk factors for self-harm including financial/legal stress, lives alone/ isolated and male.  However, risk is mitigated by no h/o suicide attempt, no plan or intent, no h/o risky impulsive behavior, describes a safety plan, h/o seeking help when needed, symptom improvement, future oriented, feeling hopeful, commitment to family, stable housing and good job situation.  Based on all  available evidence he does not appear to be at imminent risk for self-harm therefore does not meet criteria for a 72-hr hold/  involuntary hospitalization.  However, based on degree of symptoms close psych FU was recommended which the pt did agree to.  Additional steps to minimize risk include: SAFETY PLAN completed 9/19/17, frequent clinic visits, anxiety/ insomnia mngmt and med changes.  Safety Plan placed in AVS: Yes at previous visit, not needed today.    CONTROLLED SUBSTANCE STATEMENT:  The use of Lorazepam (Ativan) is indicated and appropriate.  This regimen is both beneficial and well tolerated with no adverse effects or tolerance.  There is no evidence of abuse of this medication or other substances.  Warnings related to abuse potential, street value, adverse effects, abrupt cessation, withdrawal and need for emergent care have been discussed and are understood by the patient.  The patient has verbalized clear understanding of safety issues as well as the need to control use.  Plan to continue use at this time.   Controlled Substance Contract was not completed.    [place] PRESCRIPTION MONITORING PROGRAM [] was not checked today:  previously checked, brought bottles today, no evidence of misuse/abuse    PSYCHOTROPIC DRUG INTERACTIONS:   None.  MANAGEMENT:  N/A     PLAN                                                                                                       1) PSYCHOTROPIC MEDICATIONS:  - continue desvenlafaxine 75mg daily   - continue lorazepam 1mg BID     2) THERAPY:  Continue  TREATMENT PLAN   Date revised  -  12/5/17  Date next due- 3/5/18    3) NEXT DUE:    Labs- N/A  Rating Scales- N/A    4) REFERRALS:    NONE    5) RTC: 4 weeks    6) CRISIS NUMBERS:   Provided routinely in AVS.  Especially emphasized:  ONLY if a [facility]: [facility] [place] [phone] (clinic), [phone] (after hours)      TREATMENT RISK STATEMENT:  The risks, benefits, alternatives and potential adverse effects have  been discussed and are understood by the patient/ patient's guardian. The pt understands the risks of using street drugs or alcohol.  There are no medical contraindications, the pt agrees to treatment with the ability to do so.  The patient understands to call 911 or come to the nearest ED if life threatening or urgent symptoms present.    PSYCHIATRY CLINIC INDIVIDUAL PSYCHOTHERAPY NOTE                                    16   Start time: 0840  End time: 0900  Subjective: This supportive psychotherapy session addressed issues related to goals of therapy.  Patient's reaction: Preparatory in the context of mental status appropriate for ambulatory setting.  Progress: fair  Plan: RTC 4 weeks  Psychotherapy services during this visit included  myself and Subhash.   TREATMENT  PLAN          SYMPTOMS;PROBLEMS   MEASURABLE GOALS;    FUNCTIONAL IMPROVEMENT INTERVENTIONS;    GAINS MADE DISCHARGE CRITERIA   Anxiety: nervous/tense/restless/overwhelmed   recognize and plan for anxiety provoking situation of getting on a plane by developing 2 coping skills and use these -has planned to use mindfulness while feeling increasing anxiety during travel, has not used yet  -planning to use TIPP skills particularly paced breathing, has not used yet marked symptom improvement   Depression: depressed mood   improve/ maintain good physical health practices as a coping skill by exercising at least every other day and restart yoga practice in the New Year -currently getting exercise about 5x per week, wants to maintain at 3-5x marked symptom improvement     RESIDENT:   She Rivera,     Patient not staffed in clinic note will be reviewed and signed by supervisor Dr. Michel.  I did not see this patient directly. I have reviewed the documentation and I agree with the resident's plan of care.     Alesia Michel MD

## 2017-12-07 NOTE — PATIENT INSTRUCTIONS
- Start mirtazapine 15 mg at bedtime  - No change to ativan today    - Follow up in 3-4 weeks       
1-2 drinks

## 2018-01-04 ENCOUNTER — OFFICE VISIT (OUTPATIENT)
Dept: PSYCHIATRY | Facility: CLINIC | Age: 28
End: 2018-01-04
Attending: PSYCHIATRY & NEUROLOGY
Payer: COMMERCIAL

## 2018-01-04 VITALS
DIASTOLIC BLOOD PRESSURE: 72 MMHG | WEIGHT: 200.2 LBS | SYSTOLIC BLOOD PRESSURE: 122 MMHG | HEART RATE: 80 BPM | BODY MASS INDEX: 32.31 KG/M2

## 2018-01-04 DIAGNOSIS — F41.1 GAD (GENERALIZED ANXIETY DISORDER): Primary | ICD-10-CM

## 2018-01-04 DIAGNOSIS — E55.9 VITAMIN D DEFICIENCY: ICD-10-CM

## 2018-01-04 PROCEDURE — G0463 HOSPITAL OUTPT CLINIC VISIT: HCPCS | Mod: ZF

## 2018-01-04 RX ORDER — DESVENLAFAXINE 50 MG/1
50 TABLET, FILM COATED, EXTENDED RELEASE ORAL DAILY
Qty: 30 TABLET | Refills: 1 | Status: SHIPPED | OUTPATIENT
Start: 2018-01-04 | End: 2018-03-14

## 2018-01-04 RX ORDER — DESVENLAFAXINE 25 MG/1
25 TABLET, EXTENDED RELEASE ORAL DAILY
Qty: 30 TABLET | Refills: 1 | Status: SHIPPED | OUTPATIENT
Start: 2018-01-04 | End: 2018-03-14

## 2018-01-04 ASSESSMENT — PATIENT HEALTH QUESTIONNAIRE - PHQ9: SUM OF ALL RESPONSES TO PHQ QUESTIONS 1-9: 9
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
## 2018-01-04 NOTE — MR AVS SNAPSHOT
After Visit Summary   1/4/2018    Hal Woo    MRN: [id number]           Patient Information     Date Of Birth          1990        Visit Information        Provider Department      1/4/2018 8:25 AM She Rivera MD Psychiatry Clinic        Today's Diagnoses     WILLIS (generalized anxiety disorder)    -  1    Vitamin D deficiency          Care Instructions    -continue current medications, no changes  -get labs drawn before next appointment  -return to clinic in 1 month or sooner if needed              Follow-ups after your visit        Follow-up notes from your care team     Return in about 4 weeks (around 2/1/2018) for 30 MFU.      Your next 10 appointments already scheduled     Feb 06, 2018  8:55 AM CST   Adult Med Follow UP with She Rivera MD   Psychiatry Clinic ([facility])    [address] [facility] [address] [phone]              Future tests that were ordered for you today     Open Future Orders        Priority Expected Expires Ordered    Vitamin D Deficiency Routine 1/4/2018 1/4/2019 1/4/2018    TSH with free T4 reflex Routine 1/4/2018 1/4/2019 1/4/2018            Who to contact     Please call your clinic at [phone] to:    Ask questions about your health    Make or cancel appointments    Discuss your medicines    Learn about your test results    Speak to your doctor   If you have compliments or concerns about an experience at your clinic, or if you wish to file a complaint, please contact [facility] Patient Relations at [phone] or email us at [email or web address]         Additional Information About Your Visit        MyChart Information     [employer] gives you secure access to your electronic health record. If you see a primary care provider, you can also send messages to your care team and make appointments. If you have questions, please call your primary  care clinic.  If you do not have a primary care provider, please call [phone] and they will assist you.      [employer] is an electronic gateway that provides easy, online access to your medical records. With [employer], you can request a clinic appointment, read your test results, renew a prescription or communicate with your care team.     To access your existing account, please contact your [facility] or call [phone] for assistance.        Care EveryWhere ID     This is your Care EveryWhere ID. This could be used by other organizations to access your [facility] medical records  [id number]        Your Vitals Were     Pulse BMI (Body Mass Index)                80 32.31 kg/m2           Blood Pressure from Last 3 Encounters:   01/04/18 122/72   12/05/17 123/80   11/07/17 122/80    Weight from Last 3 Encounters:   01/04/18 90.8 kg (200 lb 3.2 oz)   12/05/17 92.5 kg (204 lb)   11/07/17 94.1 kg (207 lb 6.4 oz)                 Where to get your medicines      [id number] [address], [facility] [address]     Phone:  [phone]     desvenlafaxine succinate 25 MG 24 hr tablet    desvenlafaxine succinate 50 MG 24 hr tablet          Primary Care Provider Office Phone # Fax #    Brittany Penaloza [phone]       [address] [place] [address]        Equal Access to Services     VAMSHI DODSON : Hadii aad ku hadasho Soomaali, waaxda luqadaha, qaybta kaalmada adeegyada, rich wright . So [facility] [phone].    ATENCIÓN: Si habla español, tiene a anne disposición servicios gratuitos de asistencia lingüística. Llame al [phone].    We comply with applicable federal civil rights laws and [place] laws. We do not discriminate on the basis of race, color, national origin, age, disability, sex, sexual orientation, or gender  identity.            Thank you!     Thank you for choosing PSYCHIATRY CLINIC  for your care. Our goal is always to provide you with excellent care. Hearing back from our patients is one way we can continue to improve our services. Please take a few minutes to complete the written survey that you may receive in the mail after your visit with us. Thank you!             Your Updated Medication List - Protect others around you: Learn how to safely use, store and throw away your medicines at www.disposemymeds.org.          This list is accurate as of: 1/4/18 11:59 PM.  Always use your most recent med list.                   Brand Name Dispense Instructions for use Diagnosis    cetirizine 10 MG tablet    zyrTEC     Take 10 mg by mouth        * desvenlafaxine succinate 25 MG 24 hr tablet    PRISTIQ    30 tablet    Take 1 tablet (25 mg) by mouth daily Along with 1 tablet of 50mg for total daily dose of 75mg.    WILLIS (generalized anxiety disorder)       * desvenlafaxine succinate 50 MG 24 hr tablet    PRISTIQ    30 tablet    Take 1 tablet (50 mg) by mouth daily Along with 1 tablet of 25mg for total daily dose of 75mg.    WILLIS (generalized anxiety disorder)       fluticasone 50 MCG/ACT spray    FLONASE    1 Bottle    Spray 1 spray into both nostrils daily    Acute seasonal allergic rhinitis due to pollen       LORazepam 1 MG tablet    ATIVAN    60 tablet    Take 1 tablet (1 mg) by mouth 2 times daily    WILLIS (generalized anxiety disorder)       pseudoePHEDrine 30 MG tablet    SUDAFED    28 tablet    Take 2 tablets every 4-6 hr    Acute seasonal allergic rhinitis due to pollen       triamcinolone 0.1 % ointment    KENALOG    80 g    Apply topically 2 times daily Avoid face, underarms, groin    Other atopic dermatitis       VITAMIN D PO      Take by mouth daily        * Notice:  This list has 2 medication(s) that are the same as other medications prescribed for you. Read the directions carefully, and ask your doctor or other care  provider to review them with you.

## 2018-01-04 NOTE — PATIENT INSTRUCTIONS
-continue current medications, no changes  -get labs drawn before next appointment  -return to clinic in 1 month or sooner if needed

## 2018-01-30 ENCOUNTER — PRE VISIT (OUTPATIENT)
Dept: UROLOGY | Facility: CLINIC | Age: 28
End: 2018-01-30

## 2018-01-30 ASSESSMENT — ENCOUNTER SYMPTOMS
HOARSE VOICE: 0
COUGH: 1
SMELL DISTURBANCE: 0
RECTAL PAIN: 0
VOMITING: 0
HEMATURIA: 0
MEMORY LOSS: 1
DECREASED CONCENTRATION: 1
SHORTNESS OF BREATH: 1
HEADACHES: 1
INSOMNIA: 1
JOINT SWELLING: 0
LIGHT-HEADEDNESS: 0
STIFFNESS: 0
NERVOUS/ANXIOUS: 1
HEARTBURN: 0
SINUS PAIN: 1
BACK PAIN: 1
PARALYSIS: 0
NUMBNESS: 0
SEIZURES: 0
BOWEL INCONTINENCE: 0
HYPOTENSION: 0
DYSPNEA ON EXERTION: 1
SPEECH CHANGE: 1
TINGLING: 0
LOSS OF CONSCIOUSNESS: 0
NAUSEA: 0
PANIC: 0
INCREASED ENERGY: 1
ALTERED TEMPERATURE REGULATION: 0
WHEEZING: 0
SKIN CHANGES: 0
SORE THROAT: 1
NIGHT SWEATS: 1
DECREASED APPETITE: 1
EYE REDNESS: 0
DISTURBANCES IN COORDINATION: 1
SNORES LOUDLY: 0
FLANK PAIN: 0
DIFFICULTY URINATING: 0
SYNCOPE: 0
NECK MASS: 0
PALPITATIONS: 0
COUGH DISTURBING SLEEP: 1
TREMORS: 0
POLYDIPSIA: 0
MUSCLE WEAKNESS: 0
CONSTIPATION: 0
MUSCLE CRAMPS: 0
WEIGHT LOSS: 0
BRUISES/BLEEDS EASILY: 1
BLOOD IN STOOL: 0
SINUS CONGESTION: 1
HYPERTENSION: 0
FATIGUE: 1
MYALGIAS: 1
EYE WATERING: 0
SWOLLEN GLANDS: 0
HEMOPTYSIS: 0
POLYPHAGIA: 0
JAUNDICE: 0
POSTURAL DYSPNEA: 0
POOR WOUND HEALING: 0
DIARRHEA: 0
SLEEP DISTURBANCES DUE TO BREATHING: 1
WEAKNESS: 0
NAIL CHANGES: 0
CHILLS: 0
FEVER: 0
EYE IRRITATION: 1
TROUBLE SWALLOWING: 1
ARTHRALGIAS: 1
EXERCISE INTOLERANCE: 0
DOUBLE VISION: 0
DEPRESSION: 1
DIZZINESS: 1
WEIGHT GAIN: 0
HALLUCINATIONS: 0
BLOATING: 1
DYSURIA: 0
LEG PAIN: 1
ORTHOPNEA: 0
NECK PAIN: 1
TASTE DISTURBANCE: 0
ABDOMINAL PAIN: 0
SPUTUM PRODUCTION: 1
EYE PAIN: 1

## 2018-01-30 NOTE — TELEPHONE ENCOUNTER
Patient with history of hematospermia coming in for consult with Dr. Butt. Patient chart reviewed, no need for call, all records available and ready for appointment.

## 2018-01-31 ENCOUNTER — NURSE TRIAGE (OUTPATIENT)
Dept: NURSING | Facility: CLINIC | Age: 28
End: 2018-01-31

## 2018-01-31 NOTE — PROGRESS NOTES
SUBJECTIVE:   CC: Hal Woo is an 27 year old male who presents for preventative health visit. His last physical was several years ago.  Subhash has a long standing history of generalized anxiety with panic disorder and depression.  He is followed by Dr. Rivera [facility] psychiatry.  Anxiety and depression treatment is going well.    He has the following concerns he would like to discuss today:  1. Fatigue:  Has talked to Dr. Rivera about this.  Labs recommended.  Feels tired all the time.  Is continuing all normal activities.  Exercising regularly. Sleeping fairly well.  Mind not racing.  Sleeps 7 hours nightly.   2. Itchy bumps on all fingers, present for the past several weeks.  Scattered eczema throughout torso. Currently using emollient lotion.  Has seen derm for this in the past and has steroid cream to use.  No associated redness or swelling.    Healthy Habits:   Vegan diet since 2016    Do you get at least three servings of calcium containing foods daily (dairy, green leafy vegetables, etc.)? Maybe--calcium info sheet given and discussed.    Amount of exercise or daily activities, outside of work: 1 hour(s) per day 4-5 days per week. Strength training and cardio.    Problems taking medications regularly No    Medication side effects: Yes--stable    Have you had an eye exam in the past two years? no    Do you see a dentist twice per year? yes    Do you have sleep apnea, excessive snoring or daytime drowsiness?yes, daytime drowsiness and thinks he has sleep apnea    BP Readings from Last 6 Encounters:   02/06/18 128/75   02/02/18 125/75   01/04/18 122/72   12/05/17 123/80   11/07/17 122/80   09/19/17 111/72       Today's PHQ-2 Score:   PHQ-2 ( 1999 [employer]) 3/20/2017 1/18/2017   Q1: Little interest or pleasure in doing things 1 0   Q2: Feeling down, depressed or hopeless 3 0   PHQ-2 Score 4 0     PHQ-9 SCORE 12/5/2017 1/4/2018 2/6/2018   Total Score 9 9 14     WILLIS-7 SCORE 9/12/2017 9/21/2017 2/6/2018    Total Score 13 8 7         Patient Active Problem List    Diagnosis Date Noted     Moderate episode of recurrent major depressive disorder (H) 02/06/2018     Priority: Medium     Vegan diet 02/06/2018     Priority: Medium     Since 2016       Intrinsic eczema 02/06/2018     Priority: Medium     Has seen dermatologist.  Triamcinolone cream as needed.       Hx of psychiatric care 07/12/2017     Priority: Medium     - Feb 2017: Tried hydroxyzine. Started Ativan  - March 2017: Started mirtazapine  - April 2017: Discontinued mirtazapine, started sertraline.   - May 2017: Increased sertraline       Panic disorder with agoraphobia 07/19/2016     Priority: Medium     Generalized anxiety disorder 07/18/2016     Priority: Medium     Vitamin D deficiency 06/18/2015     Priority: Medium     Overview:   6/26/2015 - up from 23 to 37, continue 4000IU/d x6 months more, then down to 2000IU/d       Insomnia 02/29/2012     Priority: Medium     Overview:   6/18/2015 - controlling symptoms with physical activity, yoga, mindfulness.       Disturbance in sleep behavior 02/03/2011     Priority: Medium     Overview:   [facility] 11/13/2011, AHI 2.9, RDI 6.9, AHI REM 10.6, Oxygen [place] 92%         Past Medical History:   Diagnosis Date     Depression, major      Severe anxiety with panic 09/2014       Past Surgical History:   Procedure Laterality Date     HC REMOVAL ADENOIDS,PRIMARY,<13 Y/O       HERNIA REPAIR      infancy     MANDIBLE SURGERY       NOSE SURGERY      deviated septum       Family History   Problem Relation Age of Onset     Lung Cancer Maternal Grandmother      Lung Cancer Paternal Grandmother      DIABETES Father      Anxiety Disorder Father      Coronary Artery Disease Paternal Grandfather      Coronary Artery Disease Maternal Grandfather      Anxiety Disorder Brother      Obesity Brother      Depression Brother      Obesity Mother      Complications related to gastric bypass       Social History   Substance Use  Topics     Smoking status: Never Smoker     Smokeless tobacco: Never Used     Alcohol use 1.2 oz/week     2 Standard drinks or equivalent per week       Social History     Social History Narrative    Lives alone.  No pets. Live is going generally OK.  Has been stressed over the past couple weeks. Work is boring but OK. Is going to school getting his [employer] at the [employer].        Has a good support system. Family is supportive.  Has friends but does not see them anymore. Difficult to make new friends.    Feels safe in all environments.    Wears seatbelt 100% of the time.    Wears helmet while biking.    Denies history of abuse, past or present, physical, sexual or emotional.    Alfreda Leon PA-C    02/06/18               Current Outpatient Prescriptions   Medication Sig Dispense Refill     desvenlafaxine succinate (PRISTIQ) 25 MG 24 hr tablet Take 1 tablet (25 mg) by mouth daily Along with 1 tablet of 50mg for total daily dose of 75mg. 30 tablet 1     desvenlafaxine succinate (PRISTIQ) 50 MG 24 hr tablet Take 1 tablet (50 mg) by mouth daily Along with 1 tablet of 25mg for total daily dose of 75mg. 30 tablet 1     LORazepam (ATIVAN) 1 MG tablet Take 1 tablet (1 mg) by mouth 2 times daily 60 tablet 1     cetirizine (ZYRTEC) 10 MG tablet Take 10 mg by mouth       triamcinolone (KENALOG) 0.1 % ointment Apply topically 2 times daily Avoid face, underarms, groin 80 g 3     fluticasone (FLONASE) 50 MCG/ACT spray Spray 1 spray into both nostrils daily (Patient not taking: Reported on 1/5/2018) 1 Bottle 3     Cholecalciferol (VITAMIN D PO) Take by mouth daily         Reviewed orders with patient. Reviewed health maintenance and updated orders accordingly - Yes    Reviewed and updated as needed this visit by clinical staff  Tobacco  Allergies  Meds  Problems  Med Hx  Surg Hx  Fam Hx  Soc Hx          Reviewed and updated as needed this visit by Provider  Tobacco  Allergies  Meds  Problems  Med Hx  Surg Hx  Fam Hx  Soc  "Hx           ROS:  C: NEGATIVE for fever, chills, change in weight  I: NEGATIVE for worrisome rashes, moles or lesions  E: NEGATIVE for vision changes or irritation  ENT: NEGATIVE for ear, mouth and throat problems  R: NEGATIVE for significant cough or SOB  CV: NEGATIVE for chest pain, palpitations or peripheral edema  GI: NEGATIVE for nausea, abdominal pain, heartburn, or change in bowel habits   male: Recently evaluated by urology for hematospermia  M: NEGATIVE for significant arthralgias or myalgia  N: NEGATIVE for weakness, dizziness or paresthesias  E: NEGATIVE for temperature intolerance, skin/hair changes  H: NEGATIVE for bleeding problems  PSYCHIATRIC: See histories    OBJECTIVE:   /75  Pulse 64  Temp 99.7  F (37.6  C) (Temporal)  Ht 5' 5.71\" (166.9 cm)  Wt 191 lb (86.6 kg)  SpO2 100%  BMI 31.1 kg/m2  EXAM:  GENERAL: healthy, alert and no distress  EYES: Eyes grossly normal to inspection, PERRL and conjunctivae and sclerae normal  HENT: ear canals and TM's normal, nose and mouth without ulcers or lesions  NECK: no adenopathy, no asymmetry, masses, or scars and thyroid normal to palpation  RESP: lungs clear to auscultation - no rales, rhonchi or wheezes  CV: regular rate and rhythm, normal S1 S2, no S3 or S4, no murmur, click or rub, no peripheral edema and peripheral pulses strong  ABDOMEN: soft, nontender, no hepatosplenomegaly, no masses and bowel sounds normal  MS: no gross musculoskeletal defects noted, no edema  SKIN: no suspicious lesions or rashes  NEURO: Normal strength and tone, mentation intact and speech normal  PSYCH: well dressed and groomed.  Good eye contact and is cooperative. Thoughts linear.  No delusions, compulsions or paranoia.  Affect flat.  Patient denies homicidal and suicidal ideation as well as no thoughts or actions of self-harm.    LYMPH: no cervical, supraclavicular, axillary, or inguinal adenopathy    ASSESSMENT/PLAN:       ICD-10-CM    1. Routine general medical " "examination at a health care facility Z00.00 Lipid Panel ([place])   2. Fatigue, unspecified type R53.83 CBC with platelets differential     Vitamin D Deficiency     TSH with free T4 reflex   3. Intrinsic eczema L20.84    4. Vegan diet Z78.9    5. Vitamin D deficiency E55.9    6. Screen for STD (sexually transmitted disease) Z11.3    7. Lipid screening Z13.220 Lipid Panel ([place])     Restart vitamin D supplement.  Labs to look for metabolic reasons for fatigue.  Consider a multivitamin due to vegan diet.  Restart flonase for persistent congestion symptoms.    Use the steroid cream you have at home on your hands. Let me know if you need a refill.  COUNSELING:  Reviewed preventive health counseling, as reflected in patient instructions  Special attention given to:        Regular exercise       Healthy diet/nutrition       Vision screening       Immunizations    TD and Influenza vaccine up to date.         Osteoporosis Prevention/Bone Health       reports that he has never smoked. He has never used smokeless tobacco.    Estimated body mass index is 31.1 kg/(m^2) as calculated from the following:    Height as of this encounter: 5' 5.71\" (166.9 cm).    Weight as of this encounter: 191 lb (86.6 kg).   Weight management plan: Discussed healthy diet and exercise guidelines and patient will follow up in 12 months in clinic to re-evaluate.    Counseling Resources:  ATP IV Guidelines  Pooled Cohorts Equation Calculator  FRAX Risk Assessment  ICSI Preventive Guidelines  Dietary Guidelines for Americans, 2010  USDA's MyPlate  ASA Prophylaxis  Lung CA Screening    Leona Leon PA-C  [facility]  "

## 2018-01-31 NOTE — PATIENT INSTRUCTIONS
Restart vitamin D supplement.  Labs to look for metabolic reasons for fatigue.  Consider a multivitamin due to vegan diet.  Restart flonase for persistent congestion symptoms.    Use the steroid cream you have at home on your hands. Let me know if you need a refill.      Preventive Health Recommendations  Male Ages 26 - 39    Yearly exam:             See your health care provider every year in order to  o   Review health changes.   o   Discuss preventive care.    o   Review your medicines if your doctor has prescribed any.    You should be tested each year for STDs (sexually transmitted diseases), if you re at risk.     After age 35, talk to your provider about cholesterol testing. If you are at risk for heart disease, have your cholesterol tested at least every 5 years.     If you are at risk for diabetes, you should have a diabetes test (fasting glucose).  Shots: Get a flu shot each year. Get a tetanus shot every 10 years.     Nutrition:    Eat at least 5 servings of fruits and vegetables daily.     Eat whole-grain bread, whole-wheat pasta and brown rice instead of white grains and rice.     Talk to your provider about Calcium and Vitamin D.     Lifestyle    Exercise for at least 150 minutes a week (30 minutes a day, 5 days a week). This will help you control your weight and prevent disease.     Limit alcohol to one drink per day.     No smoking.     Wear sunscreen to prevent skin cancer.     See your dentist every six months for an exam and cleaning.

## 2018-01-31 NOTE — TELEPHONE ENCOUNTER
Wants an appointment for a physical at [place]. I tried the back line and it rolled to the answering service. He wasn't given the option on the phone, to set up an appointment.  The answering service tried another back line number at [place].  The clinic is now open for appointments so she will transfer the patient there.  Carmencita Hollis RN-[facility]

## 2018-02-02 ENCOUNTER — RADIANT APPOINTMENT (OUTPATIENT)
Dept: CT IMAGING | Facility: CLINIC | Age: 28
End: 2018-02-02
Attending: UROLOGY
Payer: COMMERCIAL

## 2018-02-02 ENCOUNTER — APPOINTMENT (OUTPATIENT)
Dept: LAB | Facility: CLINIC | Age: 28
End: 2018-02-02
Payer: COMMERCIAL

## 2018-02-02 ENCOUNTER — OFFICE VISIT (OUTPATIENT)
Dept: UROLOGY | Facility: CLINIC | Age: 28
End: 2018-02-02
Payer: COMMERCIAL

## 2018-02-02 VITALS
DIASTOLIC BLOOD PRESSURE: 75 MMHG | WEIGHT: 204 LBS | SYSTOLIC BLOOD PRESSURE: 125 MMHG | HEIGHT: 66 IN | HEART RATE: 80 BPM | BODY MASS INDEX: 32.78 KG/M2

## 2018-02-02 DIAGNOSIS — R36.1 HEMATOSPERMIA: Primary | ICD-10-CM

## 2018-02-02 DIAGNOSIS — R36.1 HEMATOSPERMIA: ICD-10-CM

## 2018-02-02 DIAGNOSIS — Z12.5 SCREENING FOR PROSTATE CANCER: ICD-10-CM

## 2018-02-02 LAB
ALBUMIN UR-MCNC: NEGATIVE MG/DL
APPEARANCE UR: CLEAR
BILIRUB UR QL STRIP: NEGATIVE
COLOR UR AUTO: NORMAL
GLUCOSE UR STRIP-MCNC: NEGATIVE MG/DL
HGB UR QL STRIP: NEGATIVE
KETONES UR STRIP-MCNC: NEGATIVE MG/DL
LEUKOCYTE ESTERASE UR QL STRIP: NEGATIVE
NITRATE UR QL: NEGATIVE
PH UR STRIP: 7 PH (ref 5–7)
PSA SERPL-ACNC: 1.17 UG/L (ref 0–4)
RBC #/AREA URNS AUTO: 0 /HPF (ref 0–2)
SOURCE: NORMAL
SP GR UR STRIP: 1 (ref 1–1.03)
UROBILINOGEN UR STRIP-MCNC: 0 MG/DL (ref 0–2)
WBC #/AREA URNS AUTO: 0 /HPF (ref 0–2)

## 2018-02-02 ASSESSMENT — PAIN SCALES - GENERAL: PAINLEVEL: NO PAIN (0)

## 2018-02-02 NOTE — PROGRESS NOTES
I am seeing Hal Woo in consultation from Brittany Penaloza  for evaluation of hematospermia .    HPI:  Hal Woo is a 27 year old male who noted hematospermia.  Has noted off an on over the years.  Last year he had more episodes, and wanted to get evaluated.  Has not had any testing or treatment for this.  No gross hematuria.  Erections suffer a little on MDD medications, but have stabilized now.  Wakes a lot at night to void  Good force of stream, voiding fine.  No have history of prostate problems.    REVIEW OF SYSTEMS:  ROS    GENERAL HEALTH SYMPTOMS  Yes      SKIN SYMPTOMS  Yes     HEAD, EARS, NOSE AND THROAT SYMPTOMS  Yes     EYE SYMPTOMS  Yes     HEART SYMPTOMS  Yes     LUNG SYMPTOMS  Yes     INTESTINAL SYMPTOMS  Yes     URINARY SYMPTOMS  Yes     REPRODUCTIVE SYMPTOMS  Yes     SKELETAL SYMPTOMS  Yes     BLOOD SYMPTOMS  Yes     NERVOUS SYSTEM SYMPTOMS  Yes     MENTAL HEALTH SYMPTOMS  Yes     Fever  No      Loss of appetite  Yes     Weight loss  No     Weight gain  No     Fatigue  Yes     Night sweats  Yes     Chills  No     Increased stress  Yes     Excessive hunger  No     Excessive thirst  No     Feeling hot or cold when others believe the temperature is normal  No     Loss of height  No     Post-operative complications  No     Surgical site pain  No     Hallucinations  No     Change in or Loss of Energy  Yes     Hyperactivity  No     Confusion  No       [facility] [place] Skin Symptoms      Question    1/30/2018  7:41 AM CST     Changes in hair  No     Changes in moles/birth marks  No     Itching  Yes     Rashes  Yes     Changes in nails  No     Acne  No     Change in facial hair  No     Warts  No     Non-healing sores  No     Scarring  No     Flaking of skin  Yes     Color changes of hands/feet in cold   No     Sun sensitivity  No     Skin thickening  No       [facility] [place] Hent      Question    1/30/2018  7:41 AM CST     Ear pain  No     Ear discharge  No     Hearing loss  No     Ringing in  your ears  No     Nosebleeds  No     Congestion  Yes     Sinus pain  Yes     Trouble swallowing  Yes      Voice hoarseness  No     Mouth sores  No     Sore throat  Yes     Tooth pain  No     Gum tenderness  Yes     Bleeding gums  Yes     Change in taste  No     Change in sense of smell  No     Dry mouth  Yes     Hearing aid used  No     Neck lump  No       [facility] [place] Eye Symptoms      Question    1/30/2018  7:41 AM CST     Eye pain  Yes     Vision loss  No     Dry eyes  No     Watery eyes  No     Eye bulging  No     Double vision  No     Flashing of lights  No     Spots  No     Floaters  No     Redness  No     Crossed eyes  No     Tunnel Vision  No     Yellowing of eyes  No     Eye irritation  Yes       [facility] [place] Lungs Symptoms      Question    1/30/2018  7:41 AM CST     Cough  Yes     Sputum or phlegm  Yes     Coughing up blood  No     Difficulty breating or shortness of breath  Yes     Snoring  No     Wheezing  No     Difficulty breathing on exertion  Yes     Nighttime Cough  Yes     Difficulty breathing when lying flat  No       [facility] [place] Heart And Circulation Symptoms      Question    1/30/2018  7:41 AM CST     Chest pain or pressure  Yes     Fast or irregular heartbeat  No     Pain in legs with walking  Yes     Trouble breathing while lying down  No     Fingers or toes appear blue  No     High blood pressure  No     Low blood pressure  No     Fainting  No     Murmurs  No     Pacemaker  No     Varicose veins  No     Edema or swelling  No     Wake up at night with shortness of breath  Yes     Light-headedness  No     Exercise intolerance  No       [facility] [place] Stomach And Intestines Symptoms      Question    1/30/2018  7:41 AM CST     Heart burn or indigestion  No     Nausea  No     Vomiting  No     Abdominal pain  No     Bloating  Yes     Constipation  No     Diarrhea  No     Blood in stool  No     Black stools  No     Rectal or Anal pain  No     Fecal incontinence  No     Yellowing of skin or eyes  No      Vomit with blood  No     Change in stools  No       [facility] [place] Urination Symptoms      Question    1/30/2018  7:41 AM CST     Trouble holding urine or incontinence  No     Pain or burning  No     Trouble starting or stopping  No     Increased frequency of urination  No     Blood in urine  No     Decreased frequency of urination  No     Frequent nighttime urination  Yes     Flank pain  No     Difficulty emptying bladder  No       [facility] [place] Bones-Joints-Muscles Symptoms      Question    1/30/2018  7:41 AM CST     Back pain  Yes     Muscle aches  Yes     Neck pain  Yes     Swollen joints  No     Joint pain  Yes     Bone pain  No     Muscle cramps  No     Muscle weakness  No     Joint stiffness  No     Bone fracture  No       [facility] [place] Blood-Lymph Symptoms      Question    1/30/2018  7:41 AM CST     Anemia  No     Swollen glands  No     Easy bleeding or bruising  Yes     Edema or swelling  No       [facility] [place] Nervous System Symptoms      Question    1/30/2018  7:41 AM CST     Trouble with coordination  Yes     Dizziness or trouble with balance  Yes     Fainting or black-out spells  No     Memory loss  Yes     Headache  Yes     Seizures  No     Speech problems  Yes     Tingling  No     Tremor  No     Weakness  No     Difficulty walking  No     Paralysis  No     Numbness  No       [facility] [place] Reproduction (Male) Symptoms      Question    1/30/2018  7:41 AM CST     Scrotal pain or swelling  No     Erectile dysfunction  No     Penile discharge  Yes     Genital ulcers  No     Reduced libido  No       [facility] [place] Mental Health Symptoms      Question    1/30/2018  7:41 AM CST     Nervous or Anxious  Yes     Depression  Yes     Trouble sleeping  Yes     Trouble thinking or concentrating  Yes     Mood changes  Yes     Panic attacks  No           PAST MEDICAL HX:  Psych issue- depression but moreso anxiety.    PAST SURG HX:  Past Surgical History:   Procedure Laterality Date     HC REMOVAL ADENOIDS,PRIMARY,<11 Y/O  "      HERNIA REPAIR      infancy     MANDIBLE SURGERY       NOSE SURGERY      deviated septum        FAMILY HX:  Family History   Problem Relation Age of Onset     Lung Cancer Maternal Grandmother      Lung Cancer Paternal Grandmother      DIABETES Father      Anxiety Disorder Father      Coronary Artery Disease Paternal Grandfather      Coronary Artery Disease Maternal Grandfather      Anxiety Disorder Brother        SOCIAL HX:  Social History   Substance Use Topics     Smoking status: Never Smoker     Smokeless tobacco: Never Used     Alcohol use 0.0 oz/week     0 Standard drinks or equivalent per week       MEDICATIONS:  Current Outpatient Prescriptions   Medication Sig     desvenlafaxine succinate (PRISTIQ) 25 MG 24 hr tablet Take 1 tablet (25 mg) by mouth daily Along with 1 tablet of 50mg for total daily dose of 75mg.     desvenlafaxine succinate (PRISTIQ) 50 MG 24 hr tablet Take 1 tablet (50 mg) by mouth daily Along with 1 tablet of 25mg for total daily dose of 75mg.     LORazepam (ATIVAN) 1 MG tablet Take 1 tablet (1 mg) by mouth 2 times daily     fluticasone (FLONASE) 50 MCG/ACT spray Spray 1 spray into both nostrils daily (Patient not taking: Reported on 1/5/2018)     pseudoePHEDrine (SUDAFED) 30 MG tablet Take 2 tablets every 4-6 hr (Patient not taking: Reported on 1/4/2018)     cetirizine (ZYRTEC) 10 MG tablet Take 10 mg by mouth     triamcinolone (KENALOG) 0.1 % ointment Apply topically 2 times daily Avoid face, underarms, groin (Patient not taking: Reported on 1/4/2018)     Cholecalciferol (VITAMIN D PO) Take by mouth daily     No current facility-administered medications for this visit.        ALLERGIES:  Pineapple and Seasonal allergies      GENERAL PHYSICAL EXAM:     /75  Pulse 80  Ht 1.676 m (5' 6\")  Wt 92.5 kg (204 lb)  BMI 32.93 kg/m2   Constitutional: No acute distress. Well nourished.   PSYCH: normal mood and affect.  NEURO: normal gait, no focal deficits.   EYES: anicteric, EOMI, " PERR.  ENT: neck supple, no lymphadenopathy, mucosae moist, no thrush.  CARDIOPULMONARY: breathing non-labored, pulse regular rate/rhythm, no peripheral edema.  GI: Abdomen soft, non-tender, nondistended.  MUSCULOSKELETAL: normal limb proportions, no muscle wasting, no contractures.  SKIN: Normal virilized hair distribution, no lesions, warts or rashes over genitalia, abdomen extremities or face.  HEME/LYMPH: no ecchymosis, no lymphadenopathy in groin or neck, no lymphedema.     EXAM:  Phallus  circumcised, meatus adequate, no plaques palpated.   Left testis descended , size is 22-24 , consistency is normal . No intra-testicular masses.   Right testis descended , size is 22-24 , consistency is normal . No intra-testicular masses.   Epididymes present, left non-tender, not-enlarged. 1cm nodule, likely cyst at the right epididymis head  Left cord: Vas not easily palpable ( but cord is thick).  Right cord: Vas present.  Cord structures otherwise  not remarkable.     Prostate exam: 20g, nontender, anodular.    Imaging/labs:  none    ASSESSMENT:     Hematospermia - likely benign etiology.    Small right epididymal head nodule, likely cyst.  This is something he's noted for some time.  Observation advised.    Prostate cancer screening         PLAN:    UAIC    PSA today    Pelvic CT at his convenience given prolonged symptoms of hematospermia.  Evaluation for calcification, or anatomic abnormalities.  Also left vas wasn't felt easily (?) so can check for left SV presence.  If SV is missing would get renal ultrasound.    If no concerning cause for hematospermia found, then advised observation.      Copied cc to Consulting provider Brittany Penaloza        Thank-you for the kind consultation.  Hal Butt MD

## 2018-02-02 NOTE — MR AVS SNAPSHOT
After Visit Summary   2/2/2018    Hal Woo    MRN: [id number]           Patient Information     Date Of Birth          1990        Visit Information        Provider Department      2/2/2018 7:00 AM Hal Butt MD [facility] Urology and [facility] for Prostate and Urologic Cancers        Today's Diagnoses     Hematospermia    -  1    Screening for prostate cancer          Care Instructions    PSA and UA today for labs.  Schedule imaging.      It was a pleasure meeting with you today.  Thank you for allowing me and my team the privilege of caring for you today.  YOU are the reason we are here, and I truly hope we provided you with the excellent service you deserve.  Please let us know if there is anything else we can do for you so that we can be sure you are leaving completely satisfied with your care experience.                  Follow-ups after your visit        Your next 10 appointments already scheduled     Feb 02, 2018  8:00 AM CST   LAB with  LAB   [facility] ([facility])    [address] [place] [id number] [phone]           Please do not eat 10-12 hours before your appointment if you are coming in fasting for labs on lipids, cholesterol, or glucose (sugar). This does not apply to pregnant women. Water, hot tea and black coffee (with nothing added) are okay. Do not drink other fluids, diet soda or chew gum.            Feb 02, 2018  8:00 PM CST   (Arrive by 7:45 PM)   CT PELVIS W/O CONTRAST with UCCT2   [facility] [place] [facility] ([facility])    [address] [place] [id number] [phone]           Please bring any scans or X-rays taken at other hospitals, if similar tests were done. Also bring a list of your medicines, including vitamins, minerals and over-the-counter drugs. It is safest to leave personal items at home.  Be sure to tell your doctor:   If you have any  allergies.   If there s any chance you are pregnant.   If you are breastfeeding.   If you have any special needs.  You may have contrast for this exam. To prepare:   Do not eat or drink for 2 hours before your exam. If you need to take medicine, you may take it with small sips of water. (We may ask you to take liquid medicine as well.)   The day before your exam, drink extra fluids at least six 8-ounce glasses (unless your doctor tells you to restrict your fluids).  Patients over 70 or patients with diabetes or kidney problems:   If you haven t had a blood test (creatinine test) within the last 30 days, go to your clinic or Diagnostic Imaging Department for this test.  If you have diabetes:   If your kidney function is normal, continue taking your metformin (Avandamet, Glucophage, Glucovance, Metaglip) on the day of your exam.   If your kidney function is abnormal, wait 48 hours before restarting this medicine.  You will have oral contrast for this exam:   You will drink the contrast at home. Get this from your clinic or Diagnostic Imaging Department. Please follow the directions given.  Please wear loose clothing, such as a sweat suit or jogging clothes. Avoid snaps, zippers and other metal. We may ask you to undress and put on a hospital gown.  If you have any questions, please call the Imaging Department where you will have your exam.            Feb 06, 2018  8:00 AM CST   PHYSICAL with Leona Leon PA-C   [facility] ([facility])    [place] [id number] [facility]  [facility] [address]   [phone]            Feb 06, 2018  8:55 AM CST   Adult Med Follow UP with She Rivera MD   Psychiatry Clinic ([facility])    [address] [facility] [id number] [phone]              Future tests that were ordered for you today     Open Future Orders        Priority Expected Expires Ordered    CT Pelvis w/o  "Contrast Routine  2/2/2019 2/2/2018            Who to contact     Please call your clinic at [phone] to:    Ask questions about your health    Make or cancel appointments    Discuss your medicines    Learn about your test results    Speak to your doctor   If you have compliments or concerns about an experience at your clinic, or if you wish to file a complaint, please contact [facility] Patient Relations at [phone] or email us at [email or web address]         Additional Information About Your Visit        [employer] Information     [employer] gives you secure access to your electronic health record. If you see a primary care provider, you can also send messages to your care team and make appointments. If you have questions, please call your primary care clinic.  If you do not have a primary care provider, please call [phone] and they will assist you.      [employer] is an electronic gateway that provides easy, online access to your medical records. With [employer], you can request a clinic appointment, read your test results, renew a prescription or communicate with your care team.     To access your existing account, please contact your [facility] or call [phone] for assistance.        Care EveryWhere ID     This is your Care EveryWhere ID. This could be used by other organizations to access your [facility] medical records  [id number]        Your Vitals Were     Pulse Height BMI (Body Mass Index)             80 1.676 m (5' 6\") 32.93 kg/m2          Blood Pressure from Last 3 Encounters:   02/02/18 125/75   09/12/17 122/72   08/30/17 114/77    Weight from Last 3 Encounters:   02/02/18 92.5 kg (204 lb)   09/12/17 92.4 kg (203 lb 13 oz)   08/30/17 92.5 kg (204 lb)              We Performed the Following     Prostate spec antigen screen     Routine UA with micro reflex to culture        Primary Care Provider Office Phone # Fax #    Brittany " Theresa Penaloza [phone]       [address] [place] [id number]        Equal Access to Services     VAMSHI DODSON : Pernell Buchanan, elena iverson, lucysally sargentjoseenenita cansecojmnenita, rich tatoin hayaaranulfo cansecoamber servin adriana garcía. So [facility] [phone].    ATENCIÓN: Si habla español, tiene a anne disposición servicios gratuitos de asistencia lingüística. Llame al [phone].    We comply with applicable federal civil rights laws and [place] laws. We do not discriminate on the basis of race, color, national origin, age, disability, sex, sexual orientation, or gender identity.            Thank you!     Thank you for choosing [facility] UROLOGY AND [facility] FOR PROSTATE AND UROLOGIC CANCERS  for your care. Our goal is always to provide you with excellent care. Hearing back from our patients is one way we can continue to improve our services. Please take a few minutes to complete the written survey that you may receive in the mail after your visit with us. Thank you!             Your Updated Medication List - Protect others around you: Learn how to safely use, store and throw away your medicines at www.disposemymeds.org.          This list is accurate as of 2/2/18  7:54 AM.  Always use your most recent med list.                   Brand Name Dispense Instructions for use Diagnosis    cetirizine 10 MG tablet    zyrTEC     Take 10 mg by mouth        * desvenlafaxine succinate 25 MG 24 hr tablet    PRISTIQ    30 tablet    Take 1 tablet (25 mg) by mouth daily Along with 1 tablet of 50mg for total daily dose of 75mg.    WILLIS (generalized anxiety disorder)       * desvenlafaxine succinate 50 MG 24 hr tablet    PRISTIQ    30 tablet    Take 1 tablet (50 mg) by mouth daily Along with 1 tablet of 25mg for total daily dose of 75mg.    WILLIS (generalized anxiety disorder)       fluticasone 50 MCG/ACT spray    FLONASE    1 Bottle    Spray 1 spray into both nostrils daily    Acute seasonal allergic rhinitis due  to pollen       LORazepam 1 MG tablet    ATIVAN    60 tablet    Take 1 tablet (1 mg) by mouth 2 times daily    WILLIS (generalized anxiety disorder)       pseudoePHEDrine 30 MG tablet    SUDAFED    28 tablet    Take 2 tablets every 4-6 hr    Acute seasonal allergic rhinitis due to pollen       triamcinolone 0.1 % ointment    KENALOG    80 g    Apply topically 2 times daily Avoid face, underarms, groin    Other atopic dermatitis       VITAMIN D PO      Take by mouth daily        * Notice:  This list has 2 medication(s) that are the same as other medications prescribed for you. Read the directions carefully, and ask your doctor or other care provider to review them with you.

## 2018-02-02 NOTE — LETTER
2/2/2018       RE: Hal Woo  [address]  [place] [facility] [place] [address]     Dear Colleague,    Thank you for referring your patient, Hal Woo, to the [facility] UROLOGY AND INST FOR PROSTATE AND UROLOGIC CANCERS at [facility]. Please see a copy of my visit note below.    I am seeing Hal Woo in consultation from Brittany Penaloza  for evaluation of hematospermia .    HPI:  Hal Woo is a 27 year old male who noted hematospermia.  Has noted off an on over the years.  Last year he had more episodes, and wanted to get evaluated.  Has not had any testing or treatment for this.  No gross hematuria.  Erections suffer a little on MDD medications, but have stabilized now.  Wakes a lot at night to void  Good force of stream, voiding fine.  No have history of prostate problems.    REVIEW OF SYSTEMS:  ROS    GENERAL HEALTH SYMPTOMS  Yes      SKIN SYMPTOMS  Yes     HEAD, EARS, NOSE AND THROAT SYMPTOMS  Yes     EYE SYMPTOMS  Yes     HEART SYMPTOMS  Yes     LUNG SYMPTOMS  Yes     INTESTINAL SYMPTOMS  Yes     URINARY SYMPTOMS  Yes     REPRODUCTIVE SYMPTOMS  Yes     SKELETAL SYMPTOMS  Yes     BLOOD SYMPTOMS  Yes     NERVOUS SYSTEM SYMPTOMS  Yes     MENTAL HEALTH SYMPTOMS  Yes     Fever  No      Loss of appetite  Yes     Weight loss  No     Weight gain  No     Fatigue  Yes     Night sweats  Yes     Chills  No     Increased stress  Yes     Excessive hunger  No     Excessive thirst  No     Feeling hot or cold when others believe the temperature is normal  No     Loss of height  No     Post-operative complications  No     Surgical site pain  No     Hallucinations  No     Change in or Loss of Energy  Yes     Hyperactivity  No     Confusion  No       [place] Skin Symptoms      Question    1/30/2018  7:41 AM CST     Changes in hair  No     Changes in moles/birth marks  No     Itching  Yes     Rashes  Yes     Changes in nails  No     Acne  No     Change in  facial hair  No     Warts  No     Non-healing sores  No     Scarring  No     Flaking of skin  Yes     Color changes of hands/feet in cold   No     Sun sensitivity  No     Skin thickening  No       [place] Hent      Question    1/30/2018  7:41 AM CST     Ear pain  No     Ear discharge  No     Hearing loss  No     Ringing in your ears  No     Nosebleeds  No     Congestion  Yes     Sinus pain  Yes     Trouble swallowing  Yes      Voice hoarseness  No     Mouth sores  No     Sore throat  Yes     Tooth pain  No     Gum tenderness  Yes     Bleeding gums  Yes     Change in taste  No     Change in sense of smell  No     Dry mouth  Yes     Hearing aid used  No     Neck lump  No       [place] Eye Symptoms      Question    1/30/2018  7:41 AM CST     Eye pain  Yes     Vision loss  No     Dry eyes  No     Watery eyes  No     Eye bulging  No     Double vision  No     Flashing of lights  No     Spots  No     Floaters  No     Redness  No     Crossed eyes  No     Tunnel Vision  No     Yellowing of eyes  No     Eye irritation  Yes       [place] Lungs Symptoms      Question    1/30/2018  7:41 AM CST     Cough  Yes     Sputum or phlegm  Yes     Coughing up blood  No     Difficulty breating or shortness of breath  Yes     Snoring  No     Wheezing  No     Difficulty breathing on exertion  Yes     Nighttime Cough  Yes     Difficulty breathing when lying flat  No       [place] Heart And Circulation Symptoms      Question    1/30/2018  7:41 AM CST     Chest pain or pressure  Yes     Fast or irregular heartbeat  No     Pain in legs with walking  Yes     Trouble breathing while lying down  No     Fingers or toes appear blue  No     High blood pressure  No     Low blood pressure  No     Fainting  No     Murmurs  No     Pacemaker  No     Varicose veins  No     Edema or swelling  No     Wake up at night with shortness of breath  Yes     Light-headedness  No     Exercise intolerance  No       [place] Stomach And  Intestines Symptoms      Question    1/30/2018  7:41 AM CST     Heart burn or indigestion  No     Nausea  No     Vomiting  No     Abdominal pain  No     Bloating  Yes     Constipation  No     Diarrhea  No     Blood in stool  No     Black stools  No     Rectal or Anal pain  No     Fecal incontinence  No     Yellowing of skin or eyes  No     Vomit with blood  No     Change in stools  No       [place] Urination Symptoms      Question    1/30/2018  7:41 AM CST     Trouble holding urine or incontinence  No     Pain or burning  No     Trouble starting or stopping  No     Increased frequency of urination  No     Blood in urine  No     Decreased frequency of urination  No     Frequent nighttime urination  Yes     Flank pain  No     Difficulty emptying bladder  No       [place] Bones-Joints-Muscles Symptoms      Question    1/30/2018  7:41 AM CST     Back pain  Yes     Muscle aches  Yes     Neck pain  Yes     Swollen joints  No     Joint pain  Yes     Bone pain  No     Muscle cramps  No     Muscle weakness  No     Joint stiffness  No     Bone fracture  No       [place] Blood-Lymph Symptoms      Question    1/30/2018  7:41 AM CST     Anemia  No     Swollen glands  No     Easy bleeding or bruising  Yes     Edema or swelling  No       [place] Nervous System Symptoms      Question    1/30/2018  7:41 AM CST     Trouble with coordination  Yes     Dizziness or trouble with balance  Yes     Fainting or black-out spells  No     Memory loss  Yes     Headache  Yes     Seizures  No     Speech problems  Yes     Tingling  No     Tremor  No     Weakness  No     Difficulty walking  No     Paralysis  No     Numbness  No       [place] Reproduction (Male) Symptoms      Question    1/30/2018  7:41 AM CST     Scrotal pain or swelling  No     Erectile dysfunction  No     Penile discharge  Yes     Genital ulcers  No     Reduced libido  No       [place] Mental Health Symptoms      Question    1/30/2018  7:41 AM CST      Nervous or Anxious  Yes     Depression  Yes     Trouble sleeping  Yes     Trouble thinking or concentrating  Yes     Mood changes  Yes     Panic attacks  No           PAST MEDICAL HX:  Psych issue- depression but moreso anxiety.    PAST SURG HX:  Past Surgical History:   Procedure Laterality Date     HC REMOVAL ADENOIDS,PRIMARY,<13 Y/O       HERNIA REPAIR      infancy     MANDIBLE SURGERY       NOSE SURGERY      deviated septum        FAMILY HX:  Family History   Problem Relation Age of Onset     Lung Cancer Maternal Grandmother      Lung Cancer Paternal Grandmother      DIABETES Father      Anxiety Disorder Father      Coronary Artery Disease Paternal Grandfather      Coronary Artery Disease Maternal Grandfather      Anxiety Disorder Brother      SOCIAL HX:  Social History   Substance Use Topics     Smoking status: Never Smoker     Smokeless tobacco: Never Used     Alcohol use 0.0 oz/week     0 Standard drinks or equivalent per week       MEDICATIONS:  Current Outpatient Prescriptions   Medication Sig     desvenlafaxine succinate (PRISTIQ) 25 MG 24 hr tablet Take 1 tablet (25 mg) by mouth daily Along with 1 tablet of 50mg for total daily dose of 75mg.     desvenlafaxine succinate (PRISTIQ) 50 MG 24 hr tablet Take 1 tablet (50 mg) by mouth daily Along with 1 tablet of 25mg for total daily dose of 75mg.     LORazepam (ATIVAN) 1 MG tablet Take 1 tablet (1 mg) by mouth 2 times daily     fluticasone (FLONASE) 50 MCG/ACT spray Spray 1 spray into both nostrils daily (Patient not taking: Reported on 1/5/2018)     pseudoePHEDrine (SUDAFED) 30 MG tablet Take 2 tablets every 4-6 hr (Patient not taking: Reported on 1/4/2018)     cetirizine (ZYRTEC) 10 MG tablet Take 10 mg by mouth     triamcinolone (KENALOG) 0.1 % ointment Apply topically 2 times daily Avoid face, underarms, groin (Patient not taking: Reported on 1/4/2018)     Cholecalciferol (VITAMIN D PO) Take by mouth daily     No current facility-administered medications  "for this visit.        ALLERGIES:  Pineapple and Seasonal allergies      GENERAL PHYSICAL EXAM:     /75  Pulse 80  Ht 1.676 m (5' 6\")  Wt 92.5 kg (204 lb)  BMI 32.93 kg/m2   Constitutional: No acute distress. Well nourished.   PSYCH: normal mood and affect.  NEURO: normal gait, no focal deficits.   EYES: anicteric, EOMI, PERR.  ENT: neck supple, no lymphadenopathy, mucosae moist, no thrush.  CARDIOPULMONARY: breathing non-labored, pulse regular rate/rhythm, no peripheral edema.  GI: Abdomen soft, non-tender, nondistended.  MUSCULOSKELETAL: normal limb proportions, no muscle wasting, no contractures.  SKIN: Normal virilized hair distribution, no lesions, warts or rashes over genitalia, abdomen extremities or face.  HEME/LYMPH: no ecchymosis, no lymphadenopathy in groin or neck, no lymphedema.     EXAM:  Phallus  circumcised, meatus adequate, no plaques palpated.   Left testis descended , size is 22-24 , consistency is normal . No intra-testicular masses.   Right testis descended , size is 22-24 , consistency is normal . No intra-testicular masses.   Epididymes present, left non-tender, not-enlarged. 1cm nodule, likely cyst at the right epididymis head  Left cord: Vas not easily palpable ( but cord is thick).  Right cord: Vas present.  Cord structures otherwise  not remarkable.     Prostate exam: 20g, nontender, anodular.    Imaging/labs:  none    ASSESSMENT:     Hematospermia - likely benign etiology.    Small right epididymal head nodule, likely cyst.  This is something he's noted for some time.  Observation advised.    Prostate cancer screening       PLAN:    UAIC    PSA today    Pelvic CT at his convenience given prolonged symptoms of hematospermia.  Evaluation for calcification, or anatomic abnormalities.  Also left vas wasn't felt easily (?) so can check for left SV presence.  If SV is missing would get renal ultrasound.    If no concerning cause for hematospermia found, then advised " observation.      Copied cc to Consulting provider Brittany Penaloza        Thank-you for the kind consultation.  Hal Butt MD

## 2018-02-02 NOTE — PROGRESS NOTES
Dear Subhash,   Here are your recent results.   The CT scan today was normal.    There is one tiny calcification in the left prostate which in theory might be irritating and causing a little hematospermia.  There was no anatomic problems.  Both vas deferens were seen so the plumbing/anatomy all looked very normal.  There are no concerns, based on CT scan results.  Thank You  Please let me know if you have any questions.   Hal Butt MD

## 2018-02-02 NOTE — PATIENT INSTRUCTIONS
PSA and UA today for labs.  Schedule imaging.      It was a pleasure meeting with you today.  Thank you for allowing me and my team the privilege of caring for you today.  YOU are the reason we are here, and I truly hope we provided you with the excellent service you deserve.  Please let us know if there is anything else we can do for you so that we can be sure you are leaving completely satisfied with your care experience.

## 2018-02-02 NOTE — NURSING NOTE
"Chief Complaint   Patient presents with     Consult     hematospermia       Blood pressure 125/75, pulse 80, height 1.676 m (5' 6\"), weight 92.5 kg (204 lb). Body mass index is 32.93 kg/(m^2).    Patient Active Problem List   Diagnosis     Anxiety state     Depression     Contact dermatitis     Disturbance in sleep behavior     Generalized anxiety disorder     Insomnia     Panic disorder     Panic disorder with agoraphobia     Vocal cord dysfunction     Vitamin D deficiency     Hx of psychiatric care     Common wart       Allergies   Allergen Reactions     Pineapple Difficulty breathing, Hives, Itching, Rash and Shortness Of Breath     Seasonal Allergies        Current Outpatient Prescriptions   Medication Sig Dispense Refill     desvenlafaxine succinate (PRISTIQ) 25 MG 24 hr tablet Take 1 tablet (25 mg) by mouth daily Along with 1 tablet of 50mg for total daily dose of 75mg. 30 tablet 1     desvenlafaxine succinate (PRISTIQ) 50 MG 24 hr tablet Take 1 tablet (50 mg) by mouth daily Along with 1 tablet of 25mg for total daily dose of 75mg. 30 tablet 1     LORazepam (ATIVAN) 1 MG tablet Take 1 tablet (1 mg) by mouth 2 times daily 60 tablet 1     fluticasone (FLONASE) 50 MCG/ACT spray Spray 1 spray into both nostrils daily (Patient not taking: Reported on 1/5/2018) 1 Bottle 3     pseudoePHEDrine (SUDAFED) 30 MG tablet Take 2 tablets every 4-6 hr (Patient not taking: Reported on 1/4/2018) 28 tablet 1     cetirizine (ZYRTEC) 10 MG tablet Take 10 mg by mouth       triamcinolone (KENALOG) 0.1 % ointment Apply topically 2 times daily Avoid face, underarms, groin (Patient not taking: Reported on 1/4/2018) 80 g 3     Cholecalciferol (VITAMIN D PO) Take by mouth daily         Social History   Substance Use Topics     Smoking status: Never Smoker     Smokeless tobacco: Never Used     Alcohol use 0.0 oz/week     0 Standard drinks or equivalent per week       Jie Chavis LPN  2/2/2018  6:54 AM    "

## 2018-02-06 ENCOUNTER — OFFICE VISIT (OUTPATIENT)
Dept: FAMILY MEDICINE | Facility: CLINIC | Age: 28
End: 2018-02-06
Payer: COMMERCIAL

## 2018-02-06 ENCOUNTER — OFFICE VISIT (OUTPATIENT)
Dept: PSYCHIATRY | Facility: CLINIC | Age: 28
End: 2018-02-06
Attending: PSYCHIATRY & NEUROLOGY
Payer: COMMERCIAL

## 2018-02-06 VITALS
TEMPERATURE: 99.7 F | HEIGHT: 66 IN | OXYGEN SATURATION: 100 % | BODY MASS INDEX: 30.7 KG/M2 | SYSTOLIC BLOOD PRESSURE: 128 MMHG | HEART RATE: 64 BPM | WEIGHT: 191 LBS | DIASTOLIC BLOOD PRESSURE: 75 MMHG

## 2018-02-06 DIAGNOSIS — Z78.9 VEGAN DIET: ICD-10-CM

## 2018-02-06 DIAGNOSIS — F41.1 GAD (GENERALIZED ANXIETY DISORDER): Primary | ICD-10-CM

## 2018-02-06 DIAGNOSIS — Z00.00 ROUTINE GENERAL MEDICAL EXAMINATION AT A HEALTH CARE FACILITY: Primary | ICD-10-CM

## 2018-02-06 DIAGNOSIS — E55.9 VITAMIN D DEFICIENCY: ICD-10-CM

## 2018-02-06 DIAGNOSIS — Z13.220 LIPID SCREENING: ICD-10-CM

## 2018-02-06 DIAGNOSIS — L20.84 INTRINSIC ECZEMA: ICD-10-CM

## 2018-02-06 DIAGNOSIS — Z11.3 SCREEN FOR STD (SEXUALLY TRANSMITTED DISEASE): ICD-10-CM

## 2018-02-06 DIAGNOSIS — R53.83 FATIGUE, UNSPECIFIED TYPE: ICD-10-CM

## 2018-02-06 PROBLEM — F33.1 MODERATE EPISODE OF RECURRENT MAJOR DEPRESSIVE DISORDER (H): Status: ACTIVE | Noted: 2018-02-06

## 2018-02-06 PROBLEM — B07.8 COMMON WART: Status: RESOLVED | Noted: 2017-09-12 | Resolved: 2018-02-06

## 2018-02-06 LAB
BASOPHILS # BLD AUTO: 0 10E9/L (ref 0–0.2)
BASOPHILS NFR BLD AUTO: 0.4 %
CHOLEST SERPL-MCNC: 146 MG/DL (ref 0–200)
CHOLEST/HDLC SERPL: 3.1 {RATIO} (ref 0–5)
DEPRECATED CALCIDIOL+CALCIFEROL SERPL-MC: 15 UG/L (ref 20–75)
DIFFERENTIAL METHOD BLD: NORMAL
EOSINOPHIL # BLD AUTO: 0.1 10E9/L (ref 0–0.7)
EOSINOPHIL NFR BLD AUTO: 2.6 %
ERYTHROCYTE [DISTWIDTH] IN BLOOD BY AUTOMATED COUNT: 13.2 % (ref 10–15)
FASTING SPECIMEN: YES
HCT VFR BLD AUTO: 44.9 % (ref 40–53)
HDLC SERPL-MCNC: 47 MG/DL
HGB BLD-MCNC: 15.9 G/DL (ref 13.3–17.7)
IMM GRANULOCYTES # BLD: 0 10E9/L (ref 0–0.4)
IMM GRANULOCYTES NFR BLD: 0.2 %
LDLC SERPL CALC-MCNC: 81 MG/DL (ref 0–129)
LYMPHOCYTES # BLD AUTO: 1.4 10E9/L (ref 0.8–5.3)
LYMPHOCYTES NFR BLD AUTO: 30.7 %
MCH RBC QN AUTO: 30.8 PG (ref 26.5–33)
MCHC RBC AUTO-ENTMCNC: 35.4 G/DL (ref 31.5–36.5)
MCV RBC AUTO: 87 FL (ref 78–100)
MONOCYTES # BLD AUTO: 0.5 10E9/L (ref 0–1.3)
MONOCYTES NFR BLD AUTO: 9.7 %
NEUTROPHILS # BLD AUTO: 2.6 10E9/L (ref 1.6–8.3)
NEUTROPHILS NFR BLD AUTO: 56.4 %
NRBC # BLD AUTO: 0 10*3/UL
NRBC BLD AUTO-RTO: 0 /100
PLATELET # BLD AUTO: 205 10E9/L (ref 150–450)
RBC # BLD AUTO: 5.16 10E12/L (ref 4.4–5.9)
TRIGL SERPL-MCNC: 91 MG/DL (ref 0–150)
TSH SERPL DL<=0.005 MIU/L-ACNC: 1.1 MU/L (ref 0.4–4)
VLDL-CHOLESTEROL: 18 (ref 7–32)
WBC # BLD AUTO: 4.6 10E9/L (ref 4–11)

## 2018-02-06 RX ORDER — ERGOCALCIFEROL 1.25 MG/1
50000 CAPSULE, LIQUID FILLED ORAL
Qty: 8 CAPSULE | Refills: 0 | Status: SHIPPED | OUTPATIENT
Start: 2018-02-06 | End: 2019-04-04

## 2018-02-06 RX ORDER — LORAZEPAM 0.5 MG/1
TABLET ORAL
Qty: 105 TABLET | Refills: 1 | Status: SHIPPED | OUTPATIENT
Start: 2018-02-06 | End: 2018-04-25

## 2018-02-06 ASSESSMENT — PAIN SCALES - GENERAL: PAINLEVEL: NO PAIN (0)

## 2018-02-06 ASSESSMENT — ANXIETY QUESTIONNAIRES
7. FEELING AFRAID AS IF SOMETHING AWFUL MIGHT HAPPEN: SEVERAL DAYS
2. NOT BEING ABLE TO STOP OR CONTROL WORRYING: SEVERAL DAYS
GAD7 TOTAL SCORE: 7
3. WORRYING TOO MUCH ABOUT DIFFERENT THINGS: SEVERAL DAYS
6. BECOMING EASILY ANNOYED OR IRRITABLE: SEVERAL DAYS
1. FEELING NERVOUS, ANXIOUS, OR ON EDGE: SEVERAL DAYS
IF YOU CHECKED OFF ANY PROBLEMS ON THIS QUESTIONNAIRE, HOW DIFFICULT HAVE THESE PROBLEMS MADE IT FOR YOU TO DO YOUR WORK, TAKE CARE OF THINGS AT HOME, OR GET ALONG WITH OTHER PEOPLE: SOMEWHAT DIFFICULT
5. BEING SO RESTLESS THAT IT IS HARD TO SIT STILL: SEVERAL DAYS

## 2018-02-06 ASSESSMENT — PATIENT HEALTH QUESTIONNAIRE - PHQ9: 5. POOR APPETITE OR OVEREATING: SEVERAL DAYS

## 2018-02-06 NOTE — PROGRESS NOTES
"  PSYCHIATRY CLINIC PROGRESS NOTE   The initial diagnostic evaluation was on 2/13/17 and the last transfer of care evaluation was 7/11/17.    Pertinent Background:  This patient first experienced mental health issues in childhood where he saw a therapist in grade school and has received treatment for depression and anxiety.  See transfer evaluation for detailed history.  Notably, depression present in childhood along with a low level of anxiety, first noticed an increase in anxiety around May 2011 after graduation from college and moving in with parents for 2 months in the fall of that year. Oct 2011 he had first \"panic attack\" while at work in context of overuse of caffeine to help aid a hanover from a previous night of drinking EtOH. Since 2011 has had a high amount of \"fear and worry\" which is prominent when leaving his home and exacerbated by feeling like he is trapped and does not have an escape route. Hx of passive SI. Has experienced side effects (\"brain on fire\") to every medication tried aside from mirtazapine which was stopped 2/2 weight gain.      Psych critical item history includes suicidal ideation.    INTERIM HISTORY                                                 Hal Woo is a 27 year old male who was last seen in clinic on 1/4/18 at which time no changes were made.  The patient reports good treatment adherence. History was provided by the patient who was a good historian.  Since the last visit:  -has been doing well  -anxiety has been well managed   -having some depression that has been situational around a past relationship that has been coming back and he is working on this through therapy  -tolerating his medications, denies any significant side effects  -sexual side effects have decreased over the past few weeks   -notes his sleep is \"weird\" as he continues to get up around 5am and goes to bed around 11pm but will usually wake up feeling rested  -having some existential thoughts about " his existence in this world, denies having any suicidal thoughts or safety concerns, notes this is more reflective of his current life circumstances, what is purpose is and what the point of his existence should be  -having some difficulty enjoying things, but 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, encouraged to continue to find activities that bring him pleasure and purpose and be more intentional about scheduling time for those things  -has continued to get some physical activity  -still engaged in work and school but does not find these things as enjoyable or rewarding  -saw a family medicine doctor today and had physical completed along with lab work to look at thyroid and Vit D    RECENT SYMPTOMS:   DEPRESSION:  reports-depressed mood, anhedonia, low energy, insomnia, weight changes, poor concentration /memory and psychomotor changes [slowing];  DENIES- suicidal ideation and appetite changes  DEVAUGHN/HYPOMANIA:  reports-none;  DENIES- increased energy, decreased sleep need, increased activity and grandiosity  PSYCHOSIS:  reports-none;  DENIES- delusions, auditory hallucinations and visual hallucinations  DYSREGULATION:  reports-none;  DENIES- suicidal ideation, violent ideation and SIB  PANIC ATTACK:  none   ANXIETY:  excessive worry and obsessions [decreasing]  TRAUMA RELATED:  none  COMPULSIVE:  none  SLEEP:  as above    EATING DISORDER: none    RECENT SUBSTANCE USE:     ALCOHOL- occasional   TOBACCO- none            CAFFEINE- limited intake 2/2 anxiety               CANNABIS- none  OPIOIDS- none            NARCAN KIT- N/A       OTHER ILLICIT DRUGS- none     CURRENT SOCIAL HISTORY:  FINANCIAL SUPPORT- working       CHILDREN- none       LIVING SITUATION- lives alone in apartment      SOCIAL/ SPIRITUAL SUPPORT- friends, family, therapist      FEELS SAFE AT HOME- Yes     MEDICAL ROS:  Reports sexual side effects Denies diarrhea, constipation, nausea, headaches, weight  changes [increase, decrease], sedation, tremor, confusion, excessive diaphoresis, muscle twitches, bruxism, shiver and easy bruising  and withdrawal symptoms.    PSYCH and CD Critical Summary Points since July 2017 July-August: Tried 2 trials of increasing sertraline to 37.5mg with increased anxiety and anger within 24 hours; obtained genesight testing  September: Attempted to start vilazodone-no covered by insurance and appeal denied; started desvenalfaxine 25mg   October: Increased desvenlafaxine to 50mg daily to further target anxiety and depression  November: Increased desvenlafaxine to 75mg daily  December: swtiched back to [employer]  of Pristiq as he found other formulation less effective    PAST PSYCH MED TRIALS   see EMR Problem List: Hx of psychiatric care    MEDICAL / SURGICAL HISTORY                                   CARE TEAM:    PCP- Dr. Brittany Penaloza   Therapist- Sofía Ramirez, MSW MPH LICSW    Patient Active Problem List   Diagnosis     Disturbance in sleep behavior     Generalized anxiety disorder     Insomnia     Panic disorder with agoraphobia     Vitamin D deficiency     Hx of psychiatric care     Moderate episode of recurrent major depressive disorder (H)     Vegan diet     Intrinsic eczema       ALLERGY                                Pineapple and Seasonal allergies  MEDICATIONS                               Current Outpatient Prescriptions   Medication Sig Dispense Refill     LORazepam (ATIVAN) 0.5 MG tablet Take 2 tablets (1.0mg) every morning and 1.5 tablets (0.75mg) in the evening. 105 tablet 1     desvenlafaxine succinate (PRISTIQ) 25 MG 24 hr tablet Take 1 tablet (25 mg) by mouth daily Along with 1 tablet of 50mg for total daily dose of 75mg. 30 tablet 1     desvenlafaxine succinate (PRISTIQ) 50 MG 24 hr tablet Take 1 tablet (50 mg) by mouth daily Along with 1 tablet of 25mg for total daily dose of 75mg. 30 tablet 1     fluticasone (FLONASE) 50 MCG/ACT spray Spray 1  spray into both nostrils daily (Patient not taking: Reported on 1/5/2018) 1 Bottle 3     cetirizine (ZYRTEC) 10 MG tablet Take 10 mg by mouth       triamcinolone (KENALOG) 0.1 % ointment Apply topically 2 times daily Avoid face, underarms, groin 80 g 3     Cholecalciferol (VITAMIN D PO) Take by mouth daily       VITALS   There were no vitals taken for this visit.   MENTAL STATUS EXAM                                                           Alertness: alert  and oriented  Appearance: well groomed and appropriately dressed for weather  Behavior/Demeanor: cooperative and pleasant, with good  eye contact   Speech: normal  Language: intact  Psychomotor:  normal or unremarkable  Mood: depressed and anxious but improving and more mild in presentation  Affect: full range; was congruent to mood; was congruent to content  Thought Process/Associations: unremarkable  Thought Content:  Reports none;  Denies suicidal ideation, violent ideation and delusions  Perception:  Reports none;  Denies auditory hallucinations and visual hallucinations  Insight: fair   Judgment: adequate for safety  Cognition: does  appear grossly intact; formal cognitive testing was not done    LABS and DATA     RATING SCALES:  none    PHQ9 TODAY = 12  PHQ-9 SCORE 12/5/2017 1/4/2018 2/6/2018   Total Score 9 9 14     DIAGNOSIS     WILLIS with agoraphobia (improving)  Major Depression Disorder, single episode, moderate     ASSESSMENT                                     TODAY Subhash again reports relative stability in mood symptoms, has been tolerating the 75mg dose and since returning to the previous  of this medication has noticed an improvement in anxiety and mood and also a reduction in the sexual side effects. He has been having some fluctuating depressive symptoms and more existential thoughts about existence in the context of his age and life transitions but no suicidal thoughts or safety concerns. Given period of stability he inquired about  medication change today, discussed options of increasing desvenalfaxine vs. Decreasing lorazepam. He is wanting to start slow taper of lorazepam (0.25mg) every 2 weeks and see if his anxiety increases. If it does, will plan to increase dose of desvenlafaxine and may need to temporarily increase lorazepam PRN usage during increase as this has helped him tolerate dose changes in the past. He also had some lab work completed today through his PCP and he obtained a yearly physical. He will follow up in 4 weeks or sooner if needed.     CONTROLLED SUBSTANCE STATEMENT:  The use of Lorazepam (Ativan) is indicated and appropriate.  This regimen is both beneficial and well tolerated with no adverse effects or tolerance.  There is no evidence of abuse of this medication or other substances.  Warnings related to abuse potential, street value, adverse effects, abrupt cessation, withdrawal and need for emergent care have been discussed and are understood by the patient.  The patient has verbalized clear understanding of safety issues as well as the need to control use.  Plan to continue use at this time.   Controlled Substance Contract was not completed.    [place] PRESCRIPTION MONITORING PROGRAM [] was not checked today:  previously checked, brought bottles today, no evidence of misuse/abuse    PSYCHOTROPIC DRUG INTERACTIONS:   None.  MANAGEMENT:  N/A     PLAN                                                                                                       1) PSYCHOTROPIC MEDICATIONS:  - continue desvenlafaxine 75mg daily (prescribe [facility] )   - decrease lorazepam to 1.0mg QAM and 0.75mg QPM, can decrease by another 0.25mg in 2 weeks if tolerating    2) THERAPY:  Continue  TREATMENT PLAN   Date revised  -  12/5/17  Date next due- 3/5/18    3) NEXT DUE:    Labs- TSH, Vit D, lipid panel and CBC completed this AM per PCP  Rating Scales- N/A    4) REFERRALS:    NONE    5) RTC: 5 weeks    6) CRISIS NUMBERS:    Provided routinely in AVS.  Especially emphasized:  ONLY if a [facility] PT: Andres FREY Aron[place] [phone] (clinic), [phone] (after hours)      TREATMENT RISK STATEMENT:  The risks, benefits, alternatives and potential adverse effects have been discussed and are understood by the patient/ patient's guardian. The pt understands the risks of using street drugs or alcohol.  There are no medical contraindications, the pt agrees to treatment with the ability to do so.  The patient understands to call 911 or come to the nearest ED if life threatening or urgent symptoms present.    PSYCHIATRY CLINIC INDIVIDUAL PSYCHOTHERAPY NOTE                                    16   Start time: 0915  End time: 0935  Subjective: This supportive psychotherapy session addressed issues related to goals of therapy and current stressors including struggling with making decisions in regards a potential relationship.  Patient's reaction: Preparatory in the context of mental status appropriate for ambulatory setting.  Progress: fair  Plan: RTC 5 weeks  Psychotherapy services during this visit included  myself and Subhash.   TREATMENT  PLAN          SYMPTOMS;PROBLEMS   MEASURABLE GOALS;    FUNCTIONAL IMPROVEMENT INTERVENTIONS;    GAINS MADE DISCHARGE CRITERIA   Anxiety: nervous/tense/restless/overwhelmed   recognize and plan for anxiety provoking situations I.e. getting on a plane by developing 2 coping skills and use these -has planned to use mindfulness while feeling increasing anxiety during travel, was able to utilize somewhat on recent trip  -planning to use TIPP skills particularly paced breathing, has not used yet marked symptom improvement   Depression: depressed mood   improve/ maintain good physical health practices as a coping skill by exercising at least every other day and restart yoga practice in the New Year -currently getting exercise about 5x per week, wants to maintain at 3-5x marked symptom improvement     RESIDENT:   She  Miguel, DO    Patient not staffed in clinic note will be reviewed and signed by supervisor Dr. Michel.  I did not see this patient directly. I have reviewed the documentation and I agree with the resident's plan of care.     Alesia Michel MD

## 2018-02-06 NOTE — NURSING NOTE
"27 year old  Chief Complaint   Patient presents with     Physical     Fatigue     Constant tiredness. Can't recall a time that it started.        Blood pressure 150/89, pulse 64, temperature 99.7  F (37.6  C), temperature source Temporal, height 5' 5.71\" (166.9 cm), weight 191 lb (86.6 kg), SpO2 100 %. Body mass index is 31.1 kg/(m^2).  Patient Active Problem List   Diagnosis     Depression     Contact dermatitis     Disturbance in sleep behavior     Generalized anxiety disorder     Insomnia     Panic disorder with agoraphobia     Vocal cord dysfunction     Vitamin D deficiency     Hx of psychiatric care     Common wart       Wt Readings from Last 2 Encounters:   02/06/18 191 lb (86.6 kg)   02/02/18 204 lb (92.5 kg)     BP Readings from Last 3 Encounters:   02/06/18 150/89   02/02/18 125/75   09/12/17 122/72         Current Outpatient Prescriptions   Medication     desvenlafaxine succinate (PRISTIQ) 25 MG 24 hr tablet     desvenlafaxine succinate (PRISTIQ) 50 MG 24 hr tablet     LORazepam (ATIVAN) 1 MG tablet     cetirizine (ZYRTEC) 10 MG tablet     triamcinolone (KENALOG) 0.1 % ointment     fluticasone (FLONASE) 50 MCG/ACT spray     pseudoePHEDrine (SUDAFED) 30 MG tablet     Cholecalciferol (VITAMIN D PO)     No current facility-administered medications for this visit.        Social History   Substance Use Topics     Smoking status: Never Smoker     Smokeless tobacco: Never Used     Alcohol use 0.0 oz/week     0 Standard drinks or equivalent per week       Health Maintenance Due   Topic Date Due     LIPID MONITORING Q5 YEARS  04/03/1990       No results found for: PALLAVI Gray MA  February 6, 2018 8:06 AM    "

## 2018-02-06 NOTE — PATIENT INSTRUCTIONS
-decrease lorazepam to 1mg in the morning and 0.75mg in the evening, can consider decrease again by 0.25mg in approximately 2 weeks if tolerating first decrease  -continue Pristiq at current dose  -return to clinic in 5 weeks

## 2018-02-06 NOTE — MR AVS SNAPSHOT
After Visit Summary   2/6/2018    Hal Woo    MRN: [id number]           Patient Information     Date Of Birth          1990        Visit Information        Provider Department      2/6/2018 8:55 AM She Rivera MD Psychiatry Clinic        Today's Diagnoses     WILLIS (generalized anxiety disorder)    -  1      Care Instructions    -decrease lorazepam to 1mg in the morning and 0.75mg in the evening, can consider decrease again by 0.25mg in approximately 2 weeks if tolerating first decrease  -continue Pristiq at current dose  -return to clinic in 5 weeks             Follow-ups after your visit        Follow-up notes from your care team     Return in about 5 weeks (around 3/13/2018) for 30 MFU.      Your next 10 appointments already scheduled     Mar 14, 2018  8:25 AM CDT   Adult Med Follow UP with She Rivera MD   Psychiatry Clinic ([facility])    [address]  [address] [facility] [address] [phone]              Who to contact     Please call your clinic at [phone] to:    Ask questions about your health    Make or cancel appointments    Discuss your medicines    Learn about your test results    Speak to your doctor   If you have compliments or concerns about an experience at your clinic, or if you wish to file a complaint, please contact [facility] Patient Relations at [phone] or email us at [email or web address]         Additional Information About Your Visit        MyChart Information     [employer] gives you secure access to your electronic health record. If you see a primary care provider, you can also send messages to your care team and make appointments. If you have questions, please call your primary care clinic.  If you do not have a primary care provider, please call [phone] and they will assist you.      [employer] is an electronic gateway that provides easy, online  access to your medical records. With [facility] [facility], you can request a clinic appointment, read your test results, renew a prescription or communicate with your care team.     To access your existing account, please contact your [facility] or call [phone] for assistance.        Care EveryWhere ID     This is your Care EveryWhere ID. This could be used by other organizations to access your [facility] medical records  [id number]         Blood Pressure from Last 3 Encounters:   02/06/18 128/75   02/02/18 125/75   01/04/18 122/72    Weight from Last 3 Encounters:   02/06/18 86.6 kg (191 lb)   02/02/18 92.5 kg (204 lb)   01/04/18 90.8 kg (200 lb 3.2 oz)              Today, you had the following     No orders found for display         Today's Medication Changes          These changes are accurate as of 2/6/18 11:59 PM.  If you have any questions, ask your nurse or doctor.               Start taking these medicines.        Dose/Directions    vitamin D 98828 UNIT capsule   Commonly known as:  ERGOCALCIFEROL   Used for:  Vitamin D deficiency   Started by:  Leona Leon PA-C        Dose:  96357 Units   Take 1 capsule (50,000 Units) by mouth every 7 days   Quantity:  8 capsule   Refills:  0         These medicines have changed or have updated prescriptions.        Dose/Directions    LORazepam 0.5 MG tablet   Commonly known as:  ATIVAN   This may have changed:    - medication strength  - how much to take  - how to take this  - when to take this  - additional instructions   Used for:  WILLIS (generalized anxiety disorder)   Changed by:  She Rivera MD        Take 2 tablets (1.0mg) every morning and 1.5 tablets (0.75mg) in the evening.   Quantity:  105 tablet   Refills:  1         Stop taking these medicines if you haven't already. Please contact your care team if you have questions.     pseudoePHEDrine 30 MG tablet   Commonly known as:  SUDAFED   Stopped by:  Leona Leon PA-C                 Where to get your medicines      [id number] - [place], [place] - [address]  [address], [facility] [address]     Phone:  [phone]     vitamin D 90001 UNIT capsule         Some of these will need a paper prescription and others can be bought over the counter.  Ask your nurse if you have questions.     Bring a paper prescription for each of these medications     LORazepam 0.5 MG tablet                Primary Care Provider Office Phone # Fax #    Leona Leon PA-C [phone]       [address] [place] [id number]        Equal Access to Services     [facility]: Hadii aad bryce hadasho Soivory, waaxda luqadaha, qaybta kaalmada adeamberyada, rich wright . So [facility] [phone].    ATENCIÓN: Si habla español, tiene a anne disposición servicios gratuitos de asistencia lingüística. [facility] [phone].    We comply with applicable federal civil rights laws and [place] laws. We do not discriminate on the basis of race, color, national origin, age, disability, sex, sexual orientation, or gender identity.            Thank you!     Thank you for choosing PSYCHIATRY CLINIC  for your care. Our goal is always to provide you with excellent care. Hearing back from our patients is one way we can continue to improve our services. Please take a few minutes to complete the written survey that you may receive in the mail after your visit with us. Thank you!             Your Updated Medication List - Protect others around you: Learn how to safely use, store and throw away your medicines at www.disposemymeds.org.          This list is accurate as of 2/6/18 11:59 PM.  Always use your most recent med list.                   Brand Name Dispense Instructions for use Diagnosis    cetirizine 10 MG tablet    zyrTEC     Take 10 mg by mouth        * desvenlafaxine succinate 25 MG 24 hr tablet     PRISTIQ    30 tablet    Take 1 tablet (25 mg) by mouth daily Along with 1 tablet of 50mg for total daily dose of 75mg.    WILLIS (generalized anxiety disorder)       * desvenlafaxine succinate 50 MG 24 hr tablet    PRISTIQ    30 tablet    Take 1 tablet (50 mg) by mouth daily Along with 1 tablet of 25mg for total daily dose of 75mg.    WILLIS (generalized anxiety disorder)       fluticasone 50 MCG/ACT spray    FLONASE    1 Bottle    Spray 1 spray into both nostrils daily    Acute seasonal allergic rhinitis due to pollen       LORazepam 0.5 MG tablet    ATIVAN    105 tablet    Take 2 tablets (1.0mg) every morning and 1.5 tablets (0.75mg) in the evening.    WILLIS (generalized anxiety disorder)       triamcinolone 0.1 % ointment    KENALOG    80 g    Apply topically 2 times daily Avoid face, underarms, groin    Other atopic dermatitis       vitamin D 93476 UNIT capsule    ERGOCALCIFEROL    8 capsule    Take 1 capsule (50,000 Units) by mouth every 7 days    Vitamin D deficiency       VITAMIN D PO      Take by mouth daily        * Notice:  This list has 2 medication(s) that are the same as other medications prescribed for you. Read the directions carefully, and ask your doctor or other care provider to review them with you.

## 2018-02-06 NOTE — MR AVS SNAPSHOT
After Visit Summary   2/6/2018    Hal Woo    MRN: [id number]           Patient Information     Date Of Birth          1990        Visit Information        Provider Department      2/6/2018 8:00 AM Leona Leon PA-C [facility]        Today's Diagnoses     Routine general medical examination at a health care facility    -  1    Fatigue, unspecified type        Intrinsic eczema        Vegan diet        Vitamin D deficiency        Screen for STD (sexually transmitted disease)        Lipid screening          Care Instructions    Restart vitamin D supplement.  Labs to look for metabolic reasons for fatigue.  Consider a multivitamin due to vegan diet.  Restart flonase for persistent congestion symptoms.    Use the steroid cream you have at home on your hands. Let me know if you need a refill.      Preventive Health Recommendations  Male Ages 26 - 39    Yearly exam:             See your health care provider every year in order to  o   Review health changes.   o   Discuss preventive care.    o   Review your medicines if your doctor has prescribed any.    You should be tested each year for STDs (sexually transmitted diseases), if you re at risk.     After age 35, talk to your provider about cholesterol testing. If you are at risk for heart disease, have your cholesterol tested at least every 5 years.     If you are at risk for diabetes, you should have a diabetes test (fasting glucose).  Shots: Get a flu shot each year. Get a tetanus shot every 10 years.     Nutrition:    Eat at least 5 servings of fruits and vegetables daily.     Eat whole-grain bread, whole-wheat pasta and brown rice instead of white grains and rice.     Talk to your provider about Calcium and Vitamin D.     Lifestyle    Exercise for at least 150 minutes a week (30 minutes a day, 5 days a week). This will help you control your weight and prevent disease.     Limit alcohol to one drink per day.     No smoking.      Wear sunscreen to prevent skin cancer.     See your dentist every six months for an exam and cleaning.             Follow-ups after your visit        Your next 10 appointments already scheduled     Mar 14, 2018  8:25 AM CDT   Adult Med Follow UP with She Rivera MD   Psychiatry Clinic ([facility])    [address]  [address] [facility] [id number] [phone]              Who to contact     Please call your clinic at [phone] to:    Ask questions about your health    Make or cancel appointments    Discuss your medicines    Learn about your test results    Speak to your doctor   If you have compliments or concerns about an experience at your clinic, or if you wish to file a complaint, please contact [facility] Patient Relations at [phone] or email us at [email or web address]         Additional Information About Your Visit        CanaryHopharChalkboard Information     [employer] gives you secure access to your electronic health record. If you see a primary care provider, you can also send messages to your care team and make appointments. If you have questions, please call your primary care clinic.  If you do not have a primary care provider, please call [phone] and they will assist you.      PPI is an electronic gateway that provides easy, online access to your medical records. With PPI, you can request a clinic appointment, read your test results, renew a prescription or communicate with your care team.     To access your existing account, please contact your [facility] or call [phone] for assistance.        Care EveryWhere ID     This is your Care EveryWhere ID. This could be used by other organizations to access your [place] medical records  [id number]        Your Vitals Were     Pulse Temperature Height Pulse Oximetry BMI (Body Mass Index)       64 99.7  F (37.6  C) (Temporal)  "5' 5.71\" (166.9 cm) 100% 31.1 kg/m2        Blood Pressure from Last 3 Encounters:   02/06/18 128/75   02/02/18 125/75   09/12/17 122/72    Weight from Last 3 Encounters:   02/06/18 191 lb (86.6 kg)   02/02/18 204 lb (92.5 kg)   09/12/17 203 lb 13 oz (92.4 kg)              We Performed the Following     CBC with platelets differential     Lipid Panel ([place])     TSH with free T4 reflex     Vitamin D Deficiency          Today's Medication Changes          These changes are accurate as of 2/6/18 10:45 AM.  If you have any questions, ask your nurse or doctor.               These medicines have changed or have updated prescriptions.        Dose/Directions    LORazepam 0.5 MG tablet   Commonly known as:  ATIVAN   This may have changed:    - medication strength  - how much to take  - how to take this  - when to take this  - additional instructions   Used for:  WILLIS (generalized anxiety disorder)   Changed by:  She Rivera MD        Take 2 tablets (1.0mg) every morning and 1.5 tablets (0.75mg) in the evening.   Quantity:  105 tablet   Refills:  1         Stop taking these medicines if you haven't already. Please contact your care team if you have questions.     pseudoePHEDrine 30 MG tablet   Commonly known as:  SUDAFED   Stopped by:  Leona Leon PA-C                Where to get your medicines      Some of these will need a paper prescription and others can be bought over the counter.  Ask your nurse if you have questions.     Bring a paper prescription for each of these medications     LORazepam 0.5 MG tablet                Primary Care Provider Office Phone # Fax #    Brittany Penaloza [phone] [address] [facility] [address] [facility] [place] [address]        Equal Access to Services     [facility] : Pernell Buchanan, elena iverson, rich tobias. So [facility] [phone].    ATENCIÓN: Si dedrick ortiz " disposición servicios gratuitos de asistencia lingüística. Ede pitt [phone].    We comply with applicable federal civil rights laws and [place] laws. We do not discriminate on the basis of race, color, national origin, age, disability, sex, sexual orientation, or gender identity.            Thank you!     Thank you for choosing [facility]  for your care. Our goal is always to provide you with excellent care. Hearing back from our patients is one way we can continue to improve our services. Please take a few minutes to complete the written survey that you may receive in the mail after your visit with us. Thank you!             Your Updated Medication List - Protect others around you: Learn how to safely use, store and throw away your medicines at www.disposemymeds.org.          This list is accurate as of 2/6/18 10:45 AM.  Always use your most recent med list.                   Brand Name Dispense Instructions for use Diagnosis    cetirizine 10 MG tablet    zyrTEC     Take 10 mg by mouth        * desvenlafaxine succinate 25 MG 24 hr tablet    PRISTIQ    30 tablet    Take 1 tablet (25 mg) by mouth daily Along with 1 tablet of 50mg for total daily dose of 75mg.    WILLIS (generalized anxiety disorder)       * desvenlafaxine succinate 50 MG 24 hr tablet    PRISTIQ    30 tablet    Take 1 tablet (50 mg) by mouth daily Along with 1 tablet of 25mg for total daily dose of 75mg.    WILLIS (generalized anxiety disorder)       fluticasone 50 MCG/ACT spray    FLONASE    1 Bottle    Spray 1 spray into both nostrils daily    Acute seasonal allergic rhinitis due to pollen       LORazepam 0.5 MG tablet    ATIVAN    105 tablet    Take 2 tablets (1.0mg) every morning and 1.5 tablets (0.75mg) in the evening.    WILLIS (generalized anxiety disorder)       triamcinolone 0.1 % ointment    KENALOG    80 g    Apply topically 2 times daily Avoid face, underarms, groin    Other atopic dermatitis       VITAMIN D PO      Take by mouth daily         * Notice:  This list has 2 medication(s) that are the same as other medications prescribed for you. Read the directions carefully, and ask your doctor or other care provider to review them with you.

## 2018-02-07 ASSESSMENT — PATIENT HEALTH QUESTIONNAIRE - PHQ9
SUM OF ALL RESPONSES TO PHQ QUESTIONS 1-9: 14
SUM OF ALL RESPONSES TO PHQ QUESTIONS 1-9: 12

## 2018-02-07 ASSESSMENT — ANXIETY QUESTIONNAIRES: GAD7 TOTAL SCORE: 7

## 2018-02-08 ENCOUNTER — TELEPHONE (OUTPATIENT)
Dept: PSYCHIATRY | Facility: CLINIC | Age: 28
End: 2018-02-08

## 2018-02-08 NOTE — TELEPHONE ENCOUNTER
Received a faxed request from the pharmacy for lorazepam on 2/08/18.    Prescription for lorazepam 0.5 mg tab was faxed to [facility] pharmacy at [phone] on 2/06/18.  Per [place] , Rx was filled that day.    Will disregard refill request.

## 2018-03-14 ENCOUNTER — OFFICE VISIT (OUTPATIENT)
Dept: PSYCHIATRY | Facility: CLINIC | Age: 28
End: 2018-03-14
Attending: PSYCHIATRY & NEUROLOGY
Payer: COMMERCIAL

## 2018-03-14 VITALS
WEIGHT: 195.4 LBS | BODY MASS INDEX: 31.82 KG/M2 | HEART RATE: 80 BPM | SYSTOLIC BLOOD PRESSURE: 122 MMHG | DIASTOLIC BLOOD PRESSURE: 84 MMHG

## 2018-03-14 DIAGNOSIS — F41.1 GAD (GENERALIZED ANXIETY DISORDER): Primary | ICD-10-CM

## 2018-03-14 PROCEDURE — G0463 HOSPITAL OUTPT CLINIC VISIT: HCPCS | Mod: ZF

## 2018-03-14 RX ORDER — DESVENLAFAXINE 25 MG/1
25 TABLET, EXTENDED RELEASE ORAL DAILY
Qty: 30 TABLET | Refills: 1 | Status: SHIPPED | OUTPATIENT
Start: 2018-03-14 | End: 2018-04-25 | Stop reason: DRUGHIGH

## 2018-03-14 RX ORDER — DESVENLAFAXINE 50 MG/1
50 TABLET, FILM COATED, EXTENDED RELEASE ORAL DAILY
Qty: 30 TABLET | Refills: 1 | Status: SHIPPED | OUTPATIENT
Start: 2018-03-14 | End: 2018-04-25

## 2018-03-14 ASSESSMENT — PAIN SCALES - GENERAL: PAINLEVEL: NO PAIN (0)

## 2018-03-14 NOTE — PROGRESS NOTES
"  PSYCHIATRY CLINIC PROGRESS NOTE   The initial diagnostic evaluation was on 2/13/17 and the last transfer of care evaluation was 7/11/17.    Pertinent Background:  This patient first experienced mental health issues in childhood where he saw a therapist in grade school and has received treatment for depression and anxiety.  See transfer evaluation for detailed history.  Notably, depression present in childhood along with a low level of anxiety, first noticed an increase in anxiety around May 2011 after graduation from college and moving in with parents for 2 months in the fall of that year. Oct 2011 he had first \"panic attack\" while at work in context of overuse of caffeine to help aid a hanover from a previous night of drinking EtOH. Since 2011 has had a high amount of \"fear and worry\" which is prominent when leaving his home and exacerbated by feeling like he is trapped and does not have an escape route. Hx of passive SI. Has experienced side effects (\"brain on fire\") to every medication tried aside from mirtazapine which was stopped 2/2 weight gain.      Psych critical item history includes suicidal ideation.    INTERIM HISTORY                                                 Hal Woo is a 27 year old male who was last seen in clinic on 2/6/18 at which time slow taper of lorazepam was started.  The patient reports good treatment adherence. History was provided by the patient who was a good historian.  Since the last visit:  -has been doing pretty well overall  -has been able to decrease his lorazepam, is currently taking 0.75mg in the morning 0.5mg in the evening, has been tolerating this dose increase, does note some increasing anxiety over the month of March, he reports historically he has more anxiety with the transition into spring and denies any significant increase in anxiety symptoms with dose reduction  -no panic attacks, does not feel day to day functioning has been impacted by " anxiety  -depression symptoms overall continues to be relatively stable with Pristiq, he does note some worsening of mood around stressors, but is still able to engage in all of his desired activities, may still want to consider increase Pristiq in the future  -sexual side effects continue to be minimal with current dose and  of Pristiq  -no safety concerns, no suicidal thoughts or thoughts of hurting others  -has been getting good physical activity, engaged in hobbies (recording at the studio, playing videos) and has been social   -has accelerated his grad program as he is wanting to graduate which also may be making him more anxious as he thinks about transitioning into employment and trying to figure out what that step looks like  -finances have been tight, still able to meet needs but this is also a stressor    RECENT SYMPTOMS:   DEPRESSION:  reports-depressed mood, anhedonia, low energy, insomnia, weight changes, poor concentration /memory and psychomotor changes [slowing];  DENIES- suicidal ideation and excessive guilt  DEVAUGHN/HYPOMANIA:  reports-none;  DENIES- increased energy, decreased sleep need, increased activity and grandiosity  PSYCHOSIS:  reports-none;  DENIES- delusions, auditory hallucinations and visual hallucinations  DYSREGULATION:  reports-none;  DENIES- suicidal ideation, violent ideation and SIB  PANIC ATTACK:  none   ANXIETY:  excessive worry and obsessions [decreasing]  TRAUMA RELATED:  none  COMPULSIVE:  none  SLEEP:  as above    EATING DISORDER: none    RECENT SUBSTANCE USE:     ALCOHOL- occasional   TOBACCO- none            CAFFEINE- limited intake 2/2 anxiety               CANNABIS- none  OPIOIDS- none            NARCAN KIT- N/A       OTHER ILLICIT DRUGS- none     CURRENT SOCIAL HISTORY:  FINANCIAL SUPPORT- working       CHILDREN- none       LIVING SITUATION- lives alone in apartment      SOCIAL/ SPIRITUAL SUPPORT- friends, family, therapist      FEELS SAFE AT HOME- Yes      MEDICAL ROS:  Reports sexual side effects Denies diarrhea, constipation, nausea, headaches, weight changes [increase, decrease], sedation, tremor, confusion, excessive diaphoresis, muscle twitches, bruxism, shiver and easy bruising  and withdrawal symptoms.    PSYCH and CD Critical Summary Points since July 2017 July-August: Tried 2 trials of increasing sertraline to 37.5mg with increased anxiety and anger within 24 hours; obtained genesight testing  September: Attempted to start vilazodone-no covered by insurance and appeal denied; started desvenalfaxine 25mg   October: Increased desvenlafaxine to 50mg daily to further target anxiety and depression  November: Increased desvenlafaxine to 75mg daily  December: swtiched back to [employer]  of Pristiq as he found other formulation less effective    PAST PSYCH MED TRIALS   see EMR Problem List: Hx of psychiatric care    MEDICAL / SURGICAL HISTORY                                   CARE TEAM:    PCP- Dr. Brittany Penaloza   Therapist- Sofía Ramirez MSW MPH LICSW    Patient Active Problem List   Diagnosis     Disturbance in sleep behavior     Generalized anxiety disorder     Insomnia     Panic disorder with agoraphobia     Vitamin D deficiency     Hx of psychiatric care     Moderate episode of recurrent major depressive disorder (H)     Vegan diet     Intrinsic eczema       ALLERGY                                Pineapple and Seasonal allergies  MEDICATIONS                               Current Outpatient Prescriptions   Medication Sig Dispense Refill     LORazepam (ATIVAN) 0.5 MG tablet Take 2 tablets (1.0mg) every morning and 1.5 tablets (0.75mg) in the evening. 105 tablet 1     desvenlafaxine succinate (PRISTIQ) 25 MG 24 hr tablet Take 1 tablet (25 mg) by mouth daily Along with 1 tablet of 50mg for total daily dose of 75mg. 30 tablet 1     desvenlafaxine succinate (PRISTIQ) 50 MG 24 hr tablet Take 1 tablet (50 mg) by mouth daily Along with 1 tablet  of 25mg for total daily dose of 75mg. 30 tablet 1     cetirizine (ZYRTEC) 10 MG tablet Take 10 mg by mouth       triamcinolone (KENALOG) 0.1 % ointment Apply topically 2 times daily Avoid face, underarms, groin 80 g 3     Cholecalciferol (VITAMIN D PO) Take by mouth daily       vitamin D (ERGOCALCIFEROL) 02664 UNIT capsule Take 1 capsule (50,000 Units) by mouth every 7 days (Patient not taking: Reported on 2/7/2018) 8 capsule 0     fluticasone (FLONASE) 50 MCG/ACT spray Spray 1 spray into both nostrils daily (Patient not taking: Reported on 1/5/2018) 1 Bottle 3     VITALS   /84  Pulse 80  Wt 88.6 kg (195 lb 6.4 oz)  BMI 31.82 kg/m2   MENTAL STATUS EXAM                                                           Alertness: alert  and oriented  Appearance: well groomed and appropriately dressed for weather  Behavior/Demeanor: cooperative and pleasant, with good  eye contact   Speech: normal  Language: intact  Psychomotor:  normal or unremarkable  Mood: depressed and anxious but continues to improve and be more mild in presentation  Affect: full range; was congruent to mood; was congruent to content  Thought Process/Associations: unremarkable  Thought Content:  Reports none;  Denies suicidal ideation, violent ideation and delusions  Perception:  Reports none;  Denies auditory hallucinations and visual hallucinations  Insight: fair   Judgment: adequate for safety  Cognition: does  appear grossly intact; formal cognitive testing was not done    LABS and DATA     RATING SCALES:  none    PHQ9 TODAY = 9  PHQ-9 SCORE 1/4/2018 2/6/2018 2/6/2018   Total Score 9 12 14     DIAGNOSIS     WILLIS with agoraphobia (improving)  Major Depression Disorder, single episode, moderate     ASSESSMENT                                     TODAY Subhash again reports relative stability in mood symptoms despite some increase in stressors regarding work, school and relationships. No suicidal thoughts or safety concerns. Continues to tolerate  75mg dose of Pristiq and since returning to the previous  of this medication has noticed an improvement in anxiety and mood and also a reduction in the sexual side effects.  Given period of stability at last visit started very slow taper of lorazepam which he has been tolerating well at rate of decreasing by 0.25mg every 2 weeks and see if his anxiety increases. If it does, will plan to either slow taper of lorazepam or increase dose of desvenlafaxine (and may need to temporarily increase lorazepam PRN usage during increase as this has helped him tolerate dose changes in the past).  He will follow up in 6 weeks or sooner if needed.     CONTROLLED SUBSTANCE STATEMENT:  The use of Lorazepam (Ativan) is indicated and appropriate.  This regimen is both beneficial and well tolerated with no adverse effects or tolerance.  There is no evidence of abuse of this medication or other substances.  Warnings related to abuse potential, street value, adverse effects, abrupt cessation, withdrawal and need for emergent care have been discussed and are understood by the patient.  The patient has verbalized clear understanding of safety issues as well as the need to control use.  Plan to continue use at this time.   Controlled Substance Contract was not completed.    [place] PRESCRIPTION MONITORING PROGRAM [] was not checked today:  previously checked, brought bottles today, no evidence of misuse/abuse    PSYCHOTROPIC DRUG INTERACTIONS:   None.  MANAGEMENT:  N/A     PLAN                                                                                                       1) PSYCHOTROPIC MEDICATIONS:  - continue desvenlafaxine 75mg daily (prescribe rachell )   - continue lorazepam to 0.75mg QAM and 0.50mg QPM with decreasing on Thursday next week by 0.25mg, can decrease by another 0.25mg in 2 weeks after that if tolerating (still has 1 refill, script not needed today)    2) THERAPY:  Continue  TREATMENT PLAN    Date revised  -  3/14/18  Date next due- 6/14/18    3) NEXT DUE:    Labs- N/A  Rating Scales- N/A    4) REFERRALS:    NONE    5) RTC: 6 weeks    6) CRISIS NUMBERS:   Provided routinely in AVS.  Especially emphasized:  ONLY if a [facility]: [facility] [place] [phone] (clinic), [phone] (after hours)      TREATMENT RISK STATEMENT:  The risks, benefits, alternatives and potential adverse effects have been discussed and are understood by the patient/ patient's guardian. The pt understands the risks of using street drugs or alcohol.  There are no medical contraindications, the pt agrees to treatment with the ability to do so.  The patient understands to call 911 or come to the nearest ED if life threatening or urgent symptoms present.    PSYCHIATRY CLINIC INDIVIDUAL PSYCHOTHERAPY NOTE                                    16   Start time: 0840  End time: 0900  Subjective: This supportive psychotherapy session addressed issues related to goals of therapy and progress over the last 3 months and barriers to progress.  Patient's reaction: Preparatory in the context of mental status appropriate for ambulatory setting.  Progress: fair  Plan: RTC 6 weeks  Psychotherapy services during this visit included  myself and Subhash.   TREATMENT  PLAN          SYMPTOMS;PROBLEMS   MEASURABLE GOALS;    FUNCTIONAL IMPROVEMENT INTERVENTIONS;    GAINS MADE DISCHARGE CRITERIA   Anxiety: nervous/tense/restless/overwhelmed   Utilize mindfulness  -attend mindfulness classes 2x per month to continue practice  marked symptom improvement   Depression: depressed mood   improve/ maintain good physical health practices as a coping skill by exercising at least every other day and restart yoga practice in the New Year -currently getting exercise about 5x per week, wants to maintain at 3-5x; has not been returning to yoga yet and wants to keep this goal marked symptom improvement     RESIDENT:   She Rivera, DO    Patient not staffed in clinic  note will be reviewed and signed by supervisor Dr. Michel.  I did not see this patient directly. I have reviewed the documentation and I agree with the resident's plan of care.     Alesia Michel MD

## 2018-03-14 NOTE — PATIENT INSTRUCTIONS
-continue current medications with continued plan of decreasing lorazepam by 0.25mg every 2 weeks as tolerating  -return to clinic in 6 weeks or sooner if needed

## 2018-03-14 NOTE — NURSING NOTE
Chief Complaint   Patient presents with     Recheck Medication     WILLIS     Reviewed allergies, medications, pharmacy and smoking status.  Administered abuse screening questions   Obtained weight, pain level, blood pressure and heart rate

## 2018-03-14 NOTE — MR AVS SNAPSHOT
After Visit Summary   3/14/2018    Hal Woo    MRN: [id number]           Patient Information     Date Of Birth          1990        Visit Information        Provider Department      3/14/2018 8:25 AM She Rivera MD Psychiatry Clinic        Today's Diagnoses     WILLIS (generalized anxiety disorder)    -  1      Care Instructions    -continue current medications with continued plan of decreasing lorazepam by 0.25mg every 2 weeks as tolerating  -return to clinic in 6 weeks or sooner if needed          Follow-ups after your visit        Follow-up notes from your care team     Return in about 6 weeks (around 4/25/2018) for 30 MFU.      Your next 10 appointments already scheduled     Apr 25, 2018  2:15 PM CDT   Adult Med Follow UP with She Rivera MD   Psychiatry Clinic ([facility])    [place] [id number]  [address] [place] [address] [id number] [phone]              Who to contact     Please call your clinic at [phone] to:    Ask questions about your health    Make or cancel appointments    Discuss your medicines    Learn about your test results    Speak to your doctor            Additional Information About Your Visit        MyChart Information     whodoyou gives you secure access to your electronic health record. If you see a primary care provider, you can also send messages to your care team and make appointments. If you have questions, please call your primary care clinic.  If you do not have a primary care provider, please call [phone] and they will assist you.      whodoyou is an electronic gateway that provides easy, online access to your medical records. With whodoyou, you can request a clinic appointment, read your test results, renew a prescription or communicate with your care team.     To access your existing account, please contact your [facility] or call [phone] for assistance.         Care EveryWhere ID     This is your Care EveryWhere ID. This could be used by other organizations to access your [facility] medical records  [id number]        Your Vitals Were     Pulse BMI (Body Mass Index)                80 31.82 kg/m2           Blood Pressure from Last 3 Encounters:   03/14/18 122/84   02/06/18 128/75   02/02/18 125/75    Weight from Last 3 Encounters:   03/14/18 88.6 kg (195 lb 6.4 oz)   02/06/18 86.6 kg (191 lb)   02/02/18 92.5 kg (204 lb)              Today, you had the following     No orders found for display         Where to get your medicines      [id number] - [address] [address]     Phone:  [phone]     desvenlafaxine succinate 25 MG 24 hr tablet    desvenlafaxine succinate 50 MG 24 hr tablet          Primary Care Provider Office Phone # Fax #    Leona Leon PA-C [phone]       [address] [place] [address]        Equal Access to Services     [facility] : Hadii aad ku hadasho Soomaali, waaxda luqadaha, qaybta kaalmada adeamberyada, rich wright . So [facility] [phone].    ATENCIÓN: Si habla español, tiene a anne disposición servicios gratuitos de asistencia lingüística. [facility] [phone].    We comply with applicable federal civil rights laws and [place] laws. We do not discriminate on the basis of race, color, national origin, age, disability, sex, sexual orientation, or gender identity.            Thank you!     Thank you for choosing PSYCHIATRY CLINIC  for your care. Our goal is always to provide you with excellent care. Hearing back from our patients is one way we can continue to improve our services. Please take a few minutes to complete the written survey that you may receive in the mail after your visit with us. Thank you!             Your Updated Medication List - Protect others around you: Learn  how to safely use, store and throw away your medicines at www.disposemymeds.org.          This list is accurate as of 3/14/18 11:59 PM.  Always use your most recent med list.                   Brand Name Dispense Instructions for use Diagnosis    cetirizine 10 MG tablet    zyrTEC     Take 10 mg by mouth        * desvenlafaxine succinate 25 MG 24 hr tablet    PRISTIQ    30 tablet    Take 1 tablet (25 mg) by mouth daily Along with 1 tablet of 50mg for total daily dose of 75mg.    WILLIS (generalized anxiety disorder)       * desvenlafaxine succinate 50 MG 24 hr tablet    PRISTIQ    30 tablet    Take 1 tablet (50 mg) by mouth daily Along with 1 tablet of 25mg for total daily dose of 75mg.    WILLIS (generalized anxiety disorder)       fluticasone 50 MCG/ACT spray    FLONASE    1 Bottle    Spray 1 spray into both nostrils daily    Acute seasonal allergic rhinitis due to pollen       LORazepam 0.5 MG tablet    ATIVAN    105 tablet    Take 2 tablets (1.0mg) every morning and 1.5 tablets (0.75mg) in the evening.    WILLIS (generalized anxiety disorder)       triamcinolone 0.1 % ointment    KENALOG    80 g    Apply topically 2 times daily Avoid face, underarms, groin    Other atopic dermatitis       vitamin D 06660 UNIT capsule    ERGOCALCIFEROL    8 capsule    Take 1 capsule (50,000 Units) by mouth every 7 days    Vitamin D deficiency       VITAMIN D PO      Take by mouth daily        * Notice:  This list has 2 medication(s) that are the same as other medications prescribed for you. Read the directions carefully, and ask your doctor or other care provider to review them with you.

## 2018-03-15 ASSESSMENT — PATIENT HEALTH QUESTIONNAIRE - PHQ9: SUM OF ALL RESPONSES TO PHQ QUESTIONS 1-9: 9

## 2018-04-25 ENCOUNTER — OFFICE VISIT (OUTPATIENT)
Dept: PSYCHIATRY | Facility: CLINIC | Age: 28
End: 2018-04-25
Attending: PSYCHIATRY & NEUROLOGY
Payer: COMMERCIAL

## 2018-04-25 VITALS
SYSTOLIC BLOOD PRESSURE: 109 MMHG | HEART RATE: 67 BPM | WEIGHT: 193.4 LBS | BODY MASS INDEX: 31.49 KG/M2 | DIASTOLIC BLOOD PRESSURE: 67 MMHG

## 2018-04-25 DIAGNOSIS — F41.1 GAD (GENERALIZED ANXIETY DISORDER): Primary | ICD-10-CM

## 2018-04-25 PROCEDURE — G0463 HOSPITAL OUTPT CLINIC VISIT: HCPCS | Mod: ZF

## 2018-04-25 RX ORDER — LORAZEPAM 0.5 MG/1
TABLET ORAL
Qty: 105 TABLET | Refills: 1 | COMMUNITY
Start: 2018-04-25 | End: 2018-05-23

## 2018-04-25 RX ORDER — DESVENLAFAXINE 100 MG/1
100 TABLET, EXTENDED RELEASE ORAL DAILY
Qty: 30 TABLET | Refills: 0 | Status: SHIPPED | OUTPATIENT
Start: 2018-05-03 | End: 2018-05-22

## 2018-04-25 ASSESSMENT — PAIN SCALES - GENERAL: PAINLEVEL: NO PAIN (0)

## 2018-04-25 NOTE — PATIENT INSTRUCTIONS
-increase Pristiq to 100mg Thursday night of next week  -continue lorazepam 0.25mg twice per day, can take an extra dose of 0.25mg if needed to tolerate increased Pristiq dose  -return to clinic in 4 weeks or sooner if needed

## 2018-04-25 NOTE — MR AVS SNAPSHOT
After Visit Summary   4/25/2018    Hal Woo    MRN: [id number]           Patient Information     Date Of Birth          1990        Visit Information        Provider Department      4/25/2018 1:15 PM She Rivera MD Psychiatry Clinic        Today's Diagnoses     WILLIS (generalized anxiety disorder)    -  1      Care Instructions    -increase Pristiq to 100mg Thursday night of next week  -continue lorazepam 0.25mg twice per day, can take an extra dose of 0.25mg if needed to tolerate increased Pristiq dose  -return to clinic in 4 weeks or sooner if needed            Follow-ups after your visit        Follow-up notes from your care team     Return in about 4 weeks (around 5/23/2018) for 30 MFU.      Your next 10 appointments already scheduled     May 22, 2018  7:55 AM CDT   Adult Med Follow UP with She Rivera MD   Psychiatry Clinic ([facility])    [address]  [address] [address] [phone]              Who to contact     Please call your clinic at [phone] to:    Ask questions about your health    Make or cancel appointments    Discuss your medicines    Learn about your test results    Speak to your doctor            Additional Information About Your Visit        Clickberry[employer]Aligo Information     [employer] gives you secure access to your electronic health record. If you see a primary care provider, you can also send messages to your care team and make appointments. If you have questions, please call your primary care clinic.  If you do not have a primary care provider, please call [phone] and they will assist you.      [employer] is an electronic gateway that provides easy, online access to your medical records. With [employer], you can request a clinic appointment, read your test results, renew a prescription or communicate with your care team.     To access your existing account, please contact your [facility]  [place] [facility] or call [phone] for assistance.        Care EveryWhere ID     This is your Care EveryWhere ID. This could be used by other organizations to access your [facility] medical records  [id number]        Your Vitals Were     Pulse BMI (Body Mass Index)                67 31.49 kg/m2           Blood Pressure from Last 3 Encounters:   04/25/18 109/67   03/14/18 122/84   02/06/18 128/75    Weight from Last 3 Encounters:   04/25/18 87.7 kg (193 lb 6.4 oz)   03/14/18 88.6 kg (195 lb 6.4 oz)   02/06/18 86.6 kg (191 lb)              Today, you had the following     No orders found for display         Today's Medication Changes          These changes are accurate as of 4/25/18 11:59 PM.  If you have any questions, ask your nurse or doctor.               These medicines have changed or have updated prescriptions.        Dose/Directions    desvenlafaxine succinate 100 MG 24 hr tablet   Commonly known as:  PRISTIQ   This may have changed:    - medication strength  - how much to take  - additional instructions  - Another medication with the same name was removed. Continue taking this medication, and follow the directions you see here.   Used for:  WILLIS (generalized anxiety disorder)   Changed by:  She Rivera MD        Dose:  100 mg   Start taking on:  5/3/2018   Take 1 tablet (100 mg) by mouth daily   Quantity:  30 tablet   Refills:  0       LORazepam 0.5 MG tablet   Commonly known as:  ATIVAN   This may have changed:  additional instructions   Used for:  WILLIS (generalized anxiety disorder)   Changed by:  She Rivera MD        Take 0.5 tablets (0.25mg) every morning and night and can take additional 0.25mg as needed daily   Quantity:  105 tablet   Refills:  1            Where to get your medicines      These medications [id number] - [place], [place] - [address] [place] [address], [facility] [address]     Phone:  [phone]      desvenlafaxine succinate 100 MG 24 hr tablet                Primary Care Provider Office Phone # Fax #    Leona Leon PA-C [phone]       [address] [place] [address]        Equal Access to Services     VAMSHI DODSON : Hadchris wu anao Sopacoali, waaxda luqadaha, qaybta kaalmada adeamberyada, rich tatoin hayaaranulfo cansecoamber chaudharychristina wright . So [facility] [phone].    ATENCIÓN: Si habla español, tiene a anne disposición servicios gratuitos de asistencia lingüística. [facility] [phone].    We comply with applicable federal civil rights laws and [place] laws. We do not discriminate on the basis of race, color, national origin, age, disability, sex, sexual orientation, or gender identity.            Thank you!     Thank you for choosing PSYCHIATRY CLINIC  for your care. Our goal is always to provide you with excellent care. Hearing back from our patients is one way we can continue to improve our services. Please take a few minutes to complete the written survey that you may receive in the mail after your visit with us. Thank you!             Your Updated Medication List - Protect others around you: Learn how to safely use, store and throw away your medicines at www.disposemymeds.org.          This list is accurate as of 4/25/18 11:59 PM.  Always use your most recent med list.                   Brand Name Dispense Instructions for use Diagnosis    cetirizine 10 MG tablet    zyrTEC     Take 10 mg by mouth        desvenlafaxine succinate 100 MG 24 hr tablet   Start taking on:  5/3/2018    PRISTIQ    30 tablet    Take 1 tablet (100 mg) by mouth daily    WILLIS (generalized anxiety disorder)       fluticasone 50 MCG/ACT spray    FLONASE    1 Bottle    Spray 1 spray into both nostrils daily    Acute seasonal allergic rhinitis due to pollen       LORazepam 0.5 MG tablet    ATIVAN    105 tablet    Take 0.5 tablets (0.25mg) every morning and night and can take additional 0.25mg as needed daily    WILLIS  (generalized anxiety disorder)       triamcinolone 0.1 % ointment    KENALOG    80 g    Apply topically 2 times daily Avoid face, underarms, groin    Other atopic dermatitis       vitamin D 53882 UNIT capsule    ERGOCALCIFEROL    8 capsule    Take 1 capsule (50,000 Units) by mouth every 7 days    Vitamin D deficiency       VITAMIN D PO      Take by mouth daily

## 2018-04-25 NOTE — PROGRESS NOTES
"  PSYCHIATRY CLINIC PROGRESS NOTE   The initial diagnostic evaluation was on 2/13/17 and the last transfer of care evaluation was 7/11/17.    Pertinent Background:  This patient first experienced mental health issues in childhood where he saw a therapist in grade school and has received treatment for depression and anxiety.  See transfer evaluation for detailed history.  Notably, depression present in childhood along with a low level of anxiety, first noticed an increase in anxiety around May 2011 after graduation from college and moving in with parents for 2 months in the fall of that year. Oct 2011 he had first \"panic attack\" while at work in context of overuse of caffeine to help aid a hanover from a previous night of drinking EtOH. Since 2011 has had a high amount of \"fear and worry\" which is prominent when leaving his home and exacerbated by feeling like he is trapped and does not have an escape route. Hx of passive SI. Has experienced side effects (\"brain on fire\") to every medication tried aside from mirtazapine which was stopped 2/2 weight gain.      Psych critical item history includes suicidal ideation.    INTERIM HISTORY                                                 Hal Woo is a 28 year old male who was last seen in clinic on 3/14/18 at which time slow taper of lorazepam was continued.  The patient reports good treatment adherence. History was provided by the patient who was a good historian.  Since the last visit:  -anxiety has been higher, no panic attacks  -been tapering his lorazepam to 0.25mg twice per day, it has been going okay, nervous about tapering further at this point given anxiety level  -feels mood over all has been pretty even  -not sleeping not as well as he would like, having some trouble falling asleep with his anxiety and \"mind jumping around\"  -getting exercise and eating well   -has not gone to a meditation in about a month or done yoga  -continues to be engaged in therapy " which is helpful  -school and work are somewhat stressful, along with finances, and a relationship with a female friend; looking forward to being done with school but also worries about figuring out what his next steps will be as far as employment/career path   -continues to tolerate the desvenlafaxine, sexual side effects have been minimal    RECENT SYMPTOMS:   DEPRESSION:  reports-depressed mood, anhedonia, low energy, insomnia, weight changes, poor concentration /memory and psychomotor changes [slowing];  DENIES- suicidal ideation and excessive guilt  DEVAUGHN/HYPOMANIA:  reports-none;  DENIES- increased energy, decreased sleep need, increased activity and grandiosity  PSYCHOSIS:  reports-none;  DENIES- delusions, auditory hallucinations and visual hallucinations  DYSREGULATION:  reports-none;  DENIES- suicidal ideation, violent ideation and SIB  PANIC ATTACK:  none   ANXIETY:  excessive worry and obsessions [decreasing]  TRAUMA RELATED:  none  COMPULSIVE:  none  SLEEP:  as above    EATING DISORDER: none    RECENT SUBSTANCE USE:     ALCOHOL- occasional, drinking about a beer per night   TOBACCO- none            CAFFEINE- limited intake 2/2 anxiety               CANNABIS- none  OPIOIDS- none            NARCAN KIT- N/A       OTHER ILLICIT DRUGS- none     CURRENT SOCIAL HISTORY:  FINANCIAL SUPPORT- working       CHILDREN- none       LIVING SITUATION- lives alone in apartment      SOCIAL/ SPIRITUAL SUPPORT- friends, family, therapist      FEELS SAFE AT HOME- Yes     MEDICAL ROS:  Reports sexual side effects Denies diarrhea, constipation, nausea, headaches, weight changes [increase, decrease], sedation, tremor, confusion, excessive diaphoresis, muscle twitches, bruxism, shiver and easy bruising  and withdrawal symptoms.    PSYCH and CD Critical Summary Points since July 2017 July-August: Tried 2 trials of increasing sertraline to 37.5mg with increased anxiety and anger within 24 hours; obtained [employer]  testing  September: Attempted to start vilazodone-no covered by insurance and appeal denied; started desvenalfaxine 25mg   October: Increased desvenlafaxine to 50mg daily to further target anxiety and depression  November: Increased desvenlafaxine to 75mg daily  December: switched back to [employer]  of Pristiq as he found other formulation less effective  February: Started slow taper of lorazepam    PAST PSYCH MED TRIALS   see EMR Problem List: Hx of psychiatric care    MEDICAL / SURGICAL HISTORY                                   CARE TEAM:    PCP- Dr. Brittany Penaloza   Therapist- Sofía Ramirez MSW MPH LICSW    Patient Active Problem List   Diagnosis     Disturbance in sleep behavior     Generalized anxiety disorder     Insomnia     Panic disorder with agoraphobia     Vitamin D deficiency     Hx of psychiatric care     Moderate episode of recurrent major depressive disorder (H)     Vegan diet     Intrinsic eczema       ALLERGY                                Pineapple and Seasonal allergies  MEDICATIONS                               Current Outpatient Prescriptions   Medication Sig Dispense Refill     cetirizine (ZYRTEC) 10 MG tablet Take 10 mg by mouth       Cholecalciferol (VITAMIN D PO) Take by mouth daily       desvenlafaxine succinate (PRISTIQ) 25 MG 24 hr tablet Take 1 tablet (25 mg) by mouth daily Along with 1 tablet of 50mg for total daily dose of 75mg. 30 tablet 1     desvenlafaxine succinate (PRISTIQ) 50 MG 24 hr tablet Take 1 tablet (50 mg) by mouth daily Along with 1 tablet of 25mg for total daily dose of 75mg. 30 tablet 1     fluticasone (FLONASE) 50 MCG/ACT spray Spray 1 spray into both nostrils daily 1 Bottle 3     LORazepam (ATIVAN) 0.5 MG tablet Take 2 tablets (1.0mg) every morning and 1.5 tablets (0.75mg) in the evening. 105 tablet 1     triamcinolone (KENALOG) 0.1 % ointment Apply topically 2 times daily Avoid face, underarms, groin 80 g 3     vitamin D (ERGOCALCIFEROL) 98256 UNIT  capsule Take 1 capsule (50,000 Units) by mouth every 7 days 8 capsule 0     VITALS   /67  Pulse 67  Wt 87.7 kg (193 lb 6.4 oz)  BMI 31.49 kg/m2   MENTAL STATUS EXAM                                                           Alertness: alert  and oriented  Appearance: well groomed and appropriately dressed for weather  Behavior/Demeanor: cooperative and pleasant, with good  eye contact   Speech: normal  Language: intact  Psychomotor:  normal or unremarkable  Mood: depressed and anxious but continues to improve and be more mild in presentation  Affect: full range; was congruent to mood; was congruent to content  Thought Process/Associations: unremarkable  Thought Content:  Reports none;  Denies suicidal ideation, violent ideation and delusions  Perception:  Reports none;  Denies auditory hallucinations and visual hallucinations  Insight: fair   Judgment: adequate for safety  Cognition: does  appear grossly intact; formal cognitive testing was not done    LABS and DATA     RATING SCALES:  none    PHQ9 TODAY = 9  PHQ-9 SCORE 2/6/2018 2/6/2018 3/14/2018   Total Score 12 14 9     DIAGNOSIS     WILLIS with agoraphobia (improving)  Major Depression Disorder, single episode, mild    ASSESSMENT                                     TODAY Subhash again reports relative stability in mood symptoms despite some increase in stressors regarding work, school and relationships. No suicidal thoughts or safety concerns. Continues to tolerate 75mg dose of desvenlafaxine and since returning to the previous  of this medication has noticed an improvement in anxiety and mood and also a reduction in the sexual side effects.  He does not feel his anxiety is quite as well managed as he would like it to be and it has been interfering with his sleep, open to increasing desvenlafaxine next week after he completes some school projects to further to target this. He has historically had a hard time tolerating dose increases due to  reports of temporary side effects, which addition of lorazepam has been helpful for. Given he has reduced his dose of lorazepam to 0.25mg BID, will remain at this dose with extra 0.25mg PRN prescribed short term once he increases his dose of desvenlafaxine to help him tolerate this. Once he has been on 100mg of desvenlafaxine for several weeks will again look at continuing lorazepam taper as Subhash has the goal of being off of this medication in the near future. He will follow up in 4 weeks or sooner if needed.     CONTROLLED SUBSTANCE STATEMENT:  The use of Lorazepam (Ativan) is indicated and appropriate.  This regimen is both beneficial and well tolerated with no adverse effects or tolerance.  There is no evidence of abuse of this medication or other substances.  Warnings related to abuse potential, street value, adverse effects, abrupt cessation, withdrawal and need for emergent care have been discussed and are understood by the patient.  The patient has verbalized clear understanding of safety issues as well as the need to control use.  Plan to continue use at this time.   Controlled Substance Contract was not completed.    [place] PRESCRIPTION MONITORING PROGRAM [] was not checked today:  previously checked, brought bottles today, no evidence of misuse/abuse    PSYCHOTROPIC DRUG INTERACTIONS:   None.  MANAGEMENT:  N/A     PLAN                                                                                                       1) PSYCHOTROPIC MEDICATIONS:  - increase desvenlafaxine to 100mg daily next week  (prescribe [employer] )   - continue lorazepam 0.25mg BID with additional 0.25mg PRN daily (30 d/s x1 refill faxed today)    2) THERAPY:  Continue  TREATMENT PLAN   Date revised  -  3/14/18  Date next due- 6/14/18    3) NEXT DUE:    Labs- N/A  Rating Scales- N/A    4) REFERRALS:    NONE    5) RTC: 4 weeks    6) CRISIS NUMBERS:   Provided routinely in AVS.  Especially emphasized:  ONLY if a [facility]  PT: [facility] [phone] (clinic), [phone] (after hours)      TREATMENT RISK STATEMENT:  The risks, benefits, alternatives and potential adverse effects have been discussed and are understood by the patient/ patient's guardian. The pt understands the risks of using street drugs or alcohol.  There are no medical contraindications, the pt agrees to treatment with the ability to do so.  The patient understands to call 911 or come to the nearest ED if life threatening or urgent symptoms present.    PSYCHIATRY CLINIC INDIVIDUAL PSYCHOTHERAPY NOTE                                    16   Start time: 1315  End time: 1335  Subjective: This supportive psychotherapy session addressed issues related to goals of therapy and current stressors including relationships, work and school.  Patient's reaction: Preparatory in the context of mental status appropriate for ambulatory setting.  Progress: fair  Plan: RTC 4 weeks  Psychotherapy services during this visit included  myself and Subhash.   TREATMENT  PLAN          SYMPTOMS;PROBLEMS   MEASURABLE GOALS;    FUNCTIONAL IMPROVEMENT INTERVENTIONS;    GAINS MADE DISCHARGE CRITERIA   Anxiety: nervous/tense/restless/overwhelmed   Utilize mindfulness  -attend mindfulness classes 2x per month to continue practice  marked symptom improvement   Depression: depressed mood   improve/ maintain good physical health practices as a coping skill by exercising at least every other day and restart yoga practice in the New Year -currently getting exercise about 5x per week, wants to maintain at 3-5x; has not been returning to yoga yet and wants to keep this goal marked symptom improvement     RESIDENT:   She Rivera DO    Patient not staffed in clinic, note will be reviewed and signed by supervisor Dr. Michel.  I did not see this patient directly. I have reviewed the documentation and I agree with the resident's plan of care.     Alesia Michel MD

## 2018-04-26 ASSESSMENT — PATIENT HEALTH QUESTIONNAIRE - PHQ9: SUM OF ALL RESPONSES TO PHQ QUESTIONS 1-9: 9

## 2018-05-22 ENCOUNTER — OFFICE VISIT (OUTPATIENT)
Dept: PSYCHIATRY | Facility: CLINIC | Age: 28
End: 2018-05-22
Attending: PSYCHIATRY & NEUROLOGY
Payer: COMMERCIAL

## 2018-05-22 VITALS
WEIGHT: 210 LBS | BODY MASS INDEX: 34.2 KG/M2 | HEART RATE: 80 BPM | SYSTOLIC BLOOD PRESSURE: 124 MMHG | DIASTOLIC BLOOD PRESSURE: 73 MMHG

## 2018-05-22 DIAGNOSIS — F41.1 GAD (GENERALIZED ANXIETY DISORDER): Primary | ICD-10-CM

## 2018-05-22 PROCEDURE — G0463 HOSPITAL OUTPT CLINIC VISIT: HCPCS | Mod: ZF

## 2018-05-22 RX ORDER — DESVENLAFAXINE 100 MG/1
100 TABLET, EXTENDED RELEASE ORAL DAILY
Qty: 30 TABLET | Refills: 2 | Status: SHIPPED | OUTPATIENT
Start: 2018-05-22 | End: 2018-08-02

## 2018-05-22 ASSESSMENT — PAIN SCALES - GENERAL: PAINLEVEL: NO PAIN (0)

## 2018-05-22 NOTE — PATIENT INSTRUCTIONS
-continue current medications, no changes for next 2 weeks, then can consider reducing Ativan further by stopping morning or evening dose or changing this to PRN dose  -return to clinic in 4-6 weeks or sooner if needed

## 2018-05-22 NOTE — MR AVS SNAPSHOT
After Visit Summary   5/22/2018    Hal Woo    MRN: [id number]           Patient Information     Date Of Birth          1990        Visit Information        Provider Department      5/22/2018 7:55 AM She Rivera MD Psychiatry Clinic        Today's Diagnoses     WILLIS (generalized anxiety disorder)    -  1      Care Instructions    -continue current medications, no changes for next 2 weeks, then can consider reducing Ativan further by stopping morning or evening dose or changing this to PRN dose  -return to clinic in 4-6 weeks or sooner if needed            Follow-ups after your visit        Follow-up notes from your care team     Return in about 4 weeks (around 6/19/2018) for 30 MFU.      Your next 10 appointments already scheduled     Jun 26, 2018  8:25 AM CDT   Adult Med Follow UP with She Rivera MD   Psychiatry Clinic ([facility])    [place] [id number] [address] [address] [phone]              Who to contact     Please call your clinic at [phone] to:    Ask questions about your health    Make or cancel appointments    Discuss your medicines    Learn about your test results    Speak to your doctor            Additional Information About Your Visit        MyChart Information     [employer] gives you secure access to your electronic health record. If you see a primary care provider, you can also send messages to your care team and make appointments. If you have questions, please call your primary care clinic.  If you do not have a primary care provider, please call [phone] and they will assist you.      [employer] is an electronic gateway that provides easy, online access to your medical records. With [employer], you can request a clinic appointment, read your test results, renew a prescription or communicate with your care team.     To access your existing account, please contact your [facility] or call [phone] for assistance.        Care EveryWhere ID     This is your Care EveryWhere ID. This could be used by other organizations to access your [facility] medical records  [id number]        Your Vitals Were     Pulse BMI (Body Mass Index)                80 34.2 kg/m2           Blood Pressure from Last 3 Encounters:   05/22/18 124/73   04/25/18 109/67   03/14/18 122/84    Weight from Last 3 Encounters:   05/22/18 95.3 kg (210 lb)   04/25/18 87.7 kg (193 lb 6.4 oz)   03/14/18 88.6 kg (195 lb 6.4 oz)              Today, you had the following     No orders found for display         Where to get your medicines      These medications were sent to [id number] - [address] [address]     Phone:  [phone]     desvenlafaxine succinate 100 MG 24 hr tablet          Primary Care Provider Office Phone # Fax #    Leona Leon PA-C [phone]       [address] [place] [id number]        Equal Access to Services     ROBERT [facility] : Hadii aad ku hadasho Soomaali, waaxda luqadaha, qaybta kaalmada adeamberyada, rich wright . So [facility] [phone].    ATENCIÓN: Si habla español, tiene a anne disposición servicios gratuitos de asistencia lingüística. Ede al [phone].    We comply with applicable federal civil rights laws and [place] laws. We do not discriminate on the basis of race, color, national origin, age, disability, sex, sexual orientation, or gender identity.            Thank you!     Thank you for choosing PSYCHIATRY CLINIC  for your care. Our goal is always to provide you with excellent care. Hearing back from our patients is one way we can continue to improve our services. Please take a few minutes to complete the written survey that you may receive in the mail after your visit with us. Thank you!             Your Updated Medication List - Protect  others around you: Learn how to safely use, store and throw away your medicines at www.disposemymeds.org.          This list is accurate as of 5/22/18 11:59 PM.  Always use your most recent med list.                   Brand Name Dispense Instructions for use Diagnosis    cetirizine 10 MG tablet    zyrTEC     Take 10 mg by mouth        desvenlafaxine succinate 100 MG 24 hr tablet    PRISTIQ    30 tablet    Take 1 tablet (100 mg) by mouth daily    WILLIS (generalized anxiety disorder)       fluticasone 50 MCG/ACT spray    FLONASE    1 Bottle    Spray 1 spray into both nostrils daily    Acute seasonal allergic rhinitis due to pollen       LORazepam 0.5 MG tablet    ATIVAN    105 tablet    Take 0.5 tablets (0.25mg) every morning and night and can take additional 0.25mg as needed daily    WILLIS (generalized anxiety disorder)       triamcinolone 0.1 % ointment    KENALOG    80 g    Apply topically 2 times daily Avoid face, underarms, groin    Other atopic dermatitis       vitamin D 73255 UNIT capsule    ERGOCALCIFEROL    8 capsule    Take 1 capsule (50,000 Units) by mouth every 7 days    Vitamin D deficiency       VITAMIN D PO      Take by mouth daily

## 2018-05-22 NOTE — PROGRESS NOTES
"  PSYCHIATRY CLINIC PROGRESS NOTE   The initial diagnostic evaluation was on 2/13/17 and the last transfer of care evaluation was 7/11/17.    Pertinent Background:  This patient first experienced mental health issues in childhood where he saw a therapist in grade school and has received treatment for depression and anxiety.  See transfer evaluation for detailed history.  Notably, depression present in childhood along with a low level of anxiety, first noticed an increase in anxiety around May 2011 after graduation from college and moving in with parents for 2 months in the fall of that year. Oct 2011 he had first \"panic attack\" while at work in context of overuse of caffeine to help aid a hanover from a previous night of drinking EtOH. Since 2011 has had a high amount of \"fear and worry\" which is prominent when leaving his home and exacerbated by feeling like he is trapped and does not have an escape route. Hx of passive SI. Has experienced side effects (\"brain on fire\") to every medication tried aside from mirtazapine which was stopped 2/2 weight gain.      Psych critical item history includes suicidal ideation.    INTERIM HISTORY                                                 Hal Woo is a 28 year old male who was last seen in clinic on 4/25/18 at which time increasing desvenlafaxine was planned to further target mood and anxiety.  The patient reports good treatment adherence. History was provided by the patient who was a good historian.  Since the last visit:  -increased his desvenlafaxine about 2.5 weeks ago, he is tolerating this, only noted possible side effects being feeling more tired during the day, takes his medication at night, he also has been having more seasonal allergies  -no noted sexual side effects  -anxiety increased in the short term with dose change, now not as noticeable, has not required any PRN doses of lorazepam, is wanting to continue to taper this medication and once finished " "with his current course in about a week plans to decrease further and take only 0.25mg daily  -unsure about overall improvement in mood or anxiety with dose increase, will evaluate again in a few weeks  -school is going well, is able to tolerate 8 hour per day class for his accelerated course   -highest anxiety was when his school group went to [employer] [facility] [employer] [facility] for lunch and he worried about being around others and feeling like would need to \"escape\"--discussed cycle of being anxious about becoming anxious  -continues to engage in therapy and other activities that he enjoys such as exercise and music, continues to eat well   -has not had any alcohol since increasing his dose of desvenlafaxine  -denies having any safety concerns including thoughts of harming self or others    RECENT SYMPTOMS:   DEPRESSION:  reports-depressed mood, anhedonia, low energy, insomnia, poor concentration /memory and psychomotor changes [slowing];  DENIES- suicidal ideation, appetite changes and weight changes  DEVAUGHN/HYPOMANIA:  reports-none;  DENIES- increased energy, decreased sleep need, increased activity and grandiosity  PSYCHOSIS:  reports-none;  DENIES- delusions, auditory hallucinations and visual hallucinations  DYSREGULATION:  reports-none;  DENIES- suicidal ideation, violent ideation and SIB  PANIC ATTACK:  none   ANXIETY:  excessive worry and obsessions [decreasing]  TRAUMA RELATED:  none  COMPULSIVE:  none  SLEEP:  as above    EATING DISORDER: none    RECENT SUBSTANCE USE:     ALCOHOL- occasional, has not had any alcohol since recent dose increase   TOBACCO- none            CAFFEINE- limited intake 2/2 anxiety               CANNABIS- none  OPIOIDS- none            NARCAN KIT- N/A       OTHER ILLICIT DRUGS- none     CURRENT SOCIAL HISTORY:  FINANCIAL SUPPORT- working       CHILDREN- none       LIVING SITUATION- lives alone in apartment      SOCIAL/ SPIRITUAL SUPPORT- friends, family, therapist      FEELS SAFE AT HOME- Yes     MEDICAL " ROS:  Reports none Denies diarrhea, constipation, nausea, headaches, weight changes [increase, decrease], sedation, tremor, confusion, excessive diaphoresis, muscle twitches, bruxism, shiver and easy bruising, sexual side effects  and withdrawal symptoms.    PSYCH and CD Critical Summary Points since July 2017 July-August: Tried 2 trials of increasing sertraline to 37.5mg with increased anxiety and anger within 24 hours; obtained genesight testing  September: Attempted to start vilazodone-no covered by insurance and appeal denied; started desvenalfaxine 25mg   October: Increased desvenlafaxine to 50mg daily to further target anxiety and depression  November: Increased desvenlafaxine to 75mg daily  December: switched back to 140Fire  of Pristiq as he found other formulation less effective  February: Started slow taper of lorazepam  May: Increased desvenlafaxine to 100mg to further target anxiety and some depression symptoms    PAST PSYCH MED TRIALS   see EMR Problem List: Hx of psychiatric care    MEDICAL / SURGICAL HISTORY                                   CARE TEAM:    PCP- Dr. Brittany Penaloza   Therapist- Sofía Ramirez, MSW MPH LICSW    Patient Active Problem List   Diagnosis     Disturbance in sleep behavior     Generalized anxiety disorder     Insomnia     Panic disorder with agoraphobia     Vitamin D deficiency     Hx of psychiatric care     Moderate episode of recurrent major depressive disorder (H)     Vegan diet     Intrinsic eczema       ALLERGY                                Pineapple and Seasonal allergies  MEDICATIONS                               Current Outpatient Prescriptions   Medication Sig Dispense Refill     cetirizine (ZYRTEC) 10 MG tablet Take 10 mg by mouth       Cholecalciferol (VITAMIN D PO) Take by mouth daily       desvenlafaxine succinate (PRISTIQ) 100 MG 24 hr tablet Take 1 tablet (100 mg) by mouth daily 30 tablet 0     fluticasone (FLONASE) 50 MCG/ACT spray Spray  1 spray into both nostrils daily 1 Bottle 3     LORazepam (ATIVAN) 0.5 MG tablet Take 0.5 tablets (0.25mg) every morning and night and can take additional 0.25mg as needed daily 105 tablet 1     triamcinolone (KENALOG) 0.1 % ointment Apply topically 2 times daily Avoid face, underarms, groin 80 g 3     vitamin D (ERGOCALCIFEROL) 25394 UNIT capsule Take 1 capsule (50,000 Units) by mouth every 7 days (Patient not taking: Reported on 5/22/2018) 8 capsule 0     VITALS   /73  Pulse 80  Wt 95.3 kg (210 lb)  BMI 34.2 kg/m2   MENTAL STATUS EXAM                                                           Alertness: alert  and oriented  Appearance: well groomed and appropriately dressed for weather  Behavior/Demeanor: cooperative and pleasant, with good  eye contact   Speech: normal  Language: intact  Psychomotor:  normal or unremarkable  Mood: depressed and anxious but continues to improve and be more mild in presentation  Affect: full range; was congruent to mood; was congruent to content  Thought Process/Associations: unremarkable  Thought Content:  Reports none;  Denies suicidal ideation, violent ideation and delusions  Perception:  Reports none;  Denies auditory hallucinations and visual hallucinations  Insight: fair   Judgment: adequate for safety  Cognition: does  appear grossly intact; formal cognitive testing was not done    LABS and DATA     RATING SCALES:  none    PHQ9 TODAY = 8  PHQ-9 SCORE 2/6/2018 3/14/2018 4/25/2018   Total Score 14 9 9     DIAGNOSIS     WILLIS with agoraphobia (improving)  Major Depression Disorder, single episode, mild    ASSESSMENT                                     TODAY Subhash reports relative stability in mood symptoms, has tolerated the dose increase in desvenlafaxine that he made 2 weeks ago. Has not needed any PRN lorazepam to tolerate the dose increase of desvenlafaxine. No suicidal thoughts or safety concerns. Has noticed a small improvement in anxiety and mood with dose increase  but is unsure, does report no sexual side effects at this time. He has historically had a hard time tolerating dose increases due to reports of temporary side effects, which addition of lorazepam has been helpful for. Given he has reduced his dose of lorazepam to 0.25mg BID, will remain at this dose with plan to taper this medication further once he completes his current class. Subhash has the goal of being off of this medication in the near future, hoping to complete taper by next visit by reducing by 0.25mg every 2 weeks. He will follow up in 4 weeks or sooner if needed.     CONTROLLED SUBSTANCE STATEMENT:  The use of Lorazepam (Ativan) is indicated and appropriate.  This regimen is both beneficial and well tolerated with no adverse effects or tolerance.  There is no evidence of abuse of this medication or other substances.  Warnings related to abuse potential, street value, adverse effects, abrupt cessation, withdrawal and need for emergent care have been discussed and are understood by the patient.  The patient has verbalized clear understanding of safety issues as well as the need to control use.  Plan to continue use at this time.   Controlled Substance Contract was not completed.    [place] PRESCRIPTION MONITORING PROGRAM [] was not checked today:  indicates pt only receiving prescription from this clinic, no early refills or evidence of misuse/abuse    PSYCHOTROPIC DRUG INTERACTIONS:   None.  MANAGEMENT:  N/A     PLAN                                                                                                       1) PSYCHOTROPIC MEDICATIONS:  - continue desvenlafaxine 100mg daily (prescribe rachell )   - continue lorazepam 0.25mg BID (should have 1 month with 1 refill per , no refill given today)--will reduce by 0.25mg every 2 weeks as tolerated    2) THERAPY:  Continue  TREATMENT PLAN   Date revised  -  3/14/18  Date next due- 6/14/18    3) NEXT DUE:    Labs- N/A  Rating Scales- N/A    4)  REFERRALS:    NONE    5) RTC: 4 weeks    6) CRISIS NUMBERS:   Provided routinely in AVS.  Especially emphasized:  ONLY if a [facility] PT: Andres [place] Iain [phone] (clinic), [phone] (after hours)      TREATMENT RISK STATEMENT:  The risks, benefits, alternatives and potential adverse effects have been discussed and are understood by the patient/ patient's guardian. The pt understands the risks of using street drugs or alcohol.  There are no medical contraindications, the pt agrees to treatment with the ability to do so.  The patient understands to call 911 or come to the nearest ED if life threatening or urgent symptoms present.    PSYCHIATRY CLINIC INDIVIDUAL PSYCHOTHERAPY NOTE                                    16   Start time: 0800  End time: 0820  Subjective: This supportive psychotherapy session addressed issues related to goals of therapy and current stressors including relationships, work and school.  Patient's reaction: Preparatory in the context of mental status appropriate for ambulatory setting.  Progress: fair  Plan: RTC 4 weeks  Psychotherapy services during this visit included  myself and Subhash.   TREATMENT  PLAN          SYMPTOMS;PROBLEMS   MEASURABLE GOALS;    FUNCTIONAL IMPROVEMENT INTERVENTIONS;    GAINS MADE DISCHARGE CRITERIA   Anxiety: nervous/tense/restless/overwhelmed   Utilize mindfulness  -attend mindfulness classes 2x per month to continue practice  marked symptom improvement   Depression: depressed mood   improve/ maintain good physical health practices as a coping skill by exercising at least every other day and restart yoga practice in the New Year -currently getting exercise about 5x per week, wants to maintain at 3-5x; has not been returning to yoga yet and wants to keep this goal marked symptom improvement     RESIDENT:   She Rivera, DO    Patient not staffed in clinic, note will be reviewed and signed by supervisor Dr. Michel.  I did not see this patient directly. I  have reviewed the documentation and I agree with the resident's plan of care.     Alesia Michel MD

## 2018-05-22 NOTE — NURSING NOTE
Chief Complaint   Patient presents with     Recheck Medication     WILLIS (generalized anxiety disorder)

## 2018-05-23 ENCOUNTER — TELEPHONE (OUTPATIENT)
Dept: PSYCHIATRY | Facility: CLINIC | Age: 28
End: 2018-05-23

## 2018-05-23 DIAGNOSIS — F41.1 GAD (GENERALIZED ANXIETY DISORDER): ICD-10-CM

## 2018-05-23 RX ORDER — LORAZEPAM 0.5 MG/1
0.25 TABLET ORAL 2 TIMES DAILY
Qty: 30 TABLET | Refills: 0 | Status: SHIPPED | OUTPATIENT
Start: 2018-05-23 | End: 2019-11-12

## 2018-05-23 ASSESSMENT — PATIENT HEALTH QUESTIONNAIRE - PHQ9: SUM OF ALL RESPONSES TO PHQ QUESTIONS 1-9: 8

## 2018-05-23 NOTE — TELEPHONE ENCOUNTER
"-Received incoming fax from [facility] requesting a refill on Lorazepam 0.5 mg tabs with a direction of, \"Take 2 tabs PO qam and 1 and 1/2 tabs in the evening.\"  -In pt's chart, current Lorazepam 0.5 mg directions are, \"Take 0.5 tablets (0.25mg) every morning and night and can take additional 0.25mg as needed daily.\"   -Class under order is historical.   -Directions for medication matches with most recent office visit note from 5/22/18  -Writer called pharmacy and they do not have additional refills on file  -Will route to provider for clarification     "

## 2018-06-26 ENCOUNTER — OFFICE VISIT (OUTPATIENT)
Dept: PSYCHIATRY | Facility: CLINIC | Age: 28
End: 2018-06-26
Attending: PSYCHIATRY & NEUROLOGY
Payer: COMMERCIAL

## 2018-06-26 VITALS
WEIGHT: 188.4 LBS | BODY MASS INDEX: 30.68 KG/M2 | HEART RATE: 90 BPM | SYSTOLIC BLOOD PRESSURE: 128 MMHG | DIASTOLIC BLOOD PRESSURE: 76 MMHG

## 2018-06-26 DIAGNOSIS — F41.1 GAD (GENERALIZED ANXIETY DISORDER): Primary | ICD-10-CM

## 2018-06-26 PROCEDURE — G0463 HOSPITAL OUTPT CLINIC VISIT: HCPCS | Mod: ZF

## 2018-06-26 ASSESSMENT — PAIN SCALES - GENERAL: PAINLEVEL: MILD PAIN (3)

## 2018-06-26 NOTE — MR AVS SNAPSHOT
After Visit Summary   6/26/2018    Hal Woo    MRN: [id number]           Patient Information     Date Of Birth          1990        Visit Information        Provider Department      6/26/2018 8:25 AM She Rivera MD Psychiatry Clinic        Today's Diagnoses     WILLIS (generalized anxiety disorder)    -  1      Care Instructions    -continue current medication, will keep Ativan as needed for increasing anxiety/panic  -return to clinic in 4 weeks or sooner if needed            Follow-ups after your visit        Follow-up notes from your care team     Return in about 4 weeks (around 7/24/2018) for 30 MFU.      Your next 10 appointments already scheduled     Jul 25, 2018  7:45 AM CDT   (Arrive by 7:30 AM)   Return Visit with SENA Martinez [facility] ([facility])    [address] [id number] [phone]            Aug 02, 2018  8:30 AM CDT   Adult Med Follow UP with She Rivera MD   Psychiatry Clinic ([facility])    [place] [id number] [address] [address] [phone]              Who to contact     Please call your clinic at [phone] to:    Ask questions about your health    Make or cancel appointments    Discuss your medicines    Learn about your test results    Speak to your doctor            Additional Information About Your Visit        MyChart Information     [employer] gives you secure access to your electronic health record. If you see a primary care provider, you can also send messages to your care team and make appointments. If you have questions, please call your primary care clinic.  If you do not have a primary care provider, please call [phone] and they will assist you.      [employer] is an electronic gateway that provides easy, online access to your medical records. With [employer] you can request a clinic appointment,  read your test results, renew a prescription or communicate with your care team.     To access your existing account, please contact your [facility] or call [phone] for assistance.        Care EveryWhere ID     This is your Care EveryWhere ID. This could be used by other organizations to access your [facility] medical records  [id number]        Your Vitals Were     Pulse BMI (Body Mass Index)                90 30.68 kg/m2           Blood Pressure from Last 3 Encounters:   06/26/18 128/76   05/22/18 124/73   04/25/18 109/67    Weight from Last 3 Encounters:   06/26/18 85.5 kg (188 lb 6.4 oz)   05/22/18 95.3 kg (210 lb)   04/25/18 87.7 kg (193 lb 6.4 oz)              Today, you had the following     No orders found for display       Primary Care Provider Office Phone # Fax #    Leona Leon PA-C [phone]       [address] [place] [address]        Equal Access to Services     VAMSHI DODSON : Hadii aad ku hadasho Soomaali, waaxda luqadaha, qaybta kaalmada adeegyada, waxay cyrus wright . So [facility] [phone].    ATENCIÓN: Si habla español, tiene a anne disposición servicios gratuitos de asistencia lingüística. [facility] [phone].    We comply with applicable federal civil rights laws and [place] laws. We do not discriminate on the basis of race, color, national origin, age, disability, sex, sexual orientation, or gender identity.            Thank you!     Thank you for choosing PSYCHIATRY CLINIC  for your care. Our goal is always to provide you with excellent care. Hearing back from our patients is one way we can continue to improve our services. Please take a few minutes to complete the written survey that you may receive in the mail after your visit with us. Thank you!             Your Updated Medication List - Protect others around you: Learn how to safely use, store and throw away your medicines at www.disposemymeds.org.           This list is accurate as of 6/26/18 11:59 PM.  Always use your most recent med list.                   Brand Name Dispense Instructions for use Diagnosis    cetirizine 10 MG tablet    zyrTEC     Take 10 mg by mouth        desvenlafaxine succinate 100 MG 24 hr tablet    PRISTIQ    30 tablet    Take 1 tablet (100 mg) by mouth daily    WILLIS (generalized anxiety disorder)       fluticasone 50 MCG/ACT spray    FLONASE    1 Bottle    Spray 1 spray into both nostrils daily    Acute seasonal allergic rhinitis due to pollen       LORazepam 0.5 MG tablet    ATIVAN    30 tablet    Take 0.5 tablets (0.25 mg) by mouth 2 times daily    WILLIS (generalized anxiety disorder)       triamcinolone 0.1 % ointment    KENALOG    80 g    Apply topically 2 times daily Avoid face, underarms, groin    Other atopic dermatitis       vitamin B complex with vitamin C Tabs tablet      Take 1 tablet by mouth daily        vitamin D 21281 UNIT capsule    ERGOCALCIFEROL    8 capsule    Take 1 capsule (50,000 Units) by mouth every 7 days    Vitamin D deficiency       VITAMIN D PO      Take by mouth daily

## 2018-06-26 NOTE — PROGRESS NOTES
"  PSYCHIATRY CLINIC PROGRESS NOTE   The initial diagnostic evaluation was on 2/13/17 and the last transfer of care evaluation was 7/11/17.    Pertinent Background:  This patient first experienced mental health issues in childhood where he saw a therapist in grade school and has received treatment for depression and anxiety.  See transfer evaluation for detailed history.  Notably, depression present in childhood along with a low level of anxiety, first noticed an increase in anxiety around May 2011 after graduation from college and moving in with parents for 2 months in the fall of that year. Oct 2011 he had first \"panic attack\" while at work in context of overuse of caffeine to help aid a hangover from a previous night of drinking EtOH. Since 2011 has had a high amount of \"fear and worry\" which is prominent when leaving his home and exacerbated by feeling like he is trapped and does not have an escape route. Hx of passive SI. Has experienced side effects (\"brain on fire\") to every medication tried aside from mirtazapine which was stopped 2/2 weight gain.      Psych critical item history includes suicidal ideation.    INTERIM HISTORY                                                 Hal Woo is a 28 year old male who was last seen in clinic on 5/22/18 at which time slow taper of lorazepam was continued.  The patient reports good treatment adherence. History was provided by the patient who was a good historian.  Since the last visit:  -has been doing relatively well, has been off the scheduled lorazepam since Friday, has noticed some increase in anxiety around this   -anxiety has not been impacting his ability to do activities  -one big stressor is thinking about moving to [place] once he is done with school and trying to figure out what his next steps will be  -has not had any panic attack but thinks this may be building in that direction but feels he could handle this and has coping skills   -has been " discussing the stressors and decisions he is trying to make in his therapy sessions which has been helpful  -has not been drinking much alcohol, thinking about having some when at social events, understands this is not recommended for those with depression/anxiety  -able to drink some decaf coffee, anxiety was a little worse with this but tolerated it okay overall   -continues to get physical activity and finds this to be a main coping mechanism for his anxiety and low mood   -denies having any safety concerns including thoughts of harming self or others    RECENT SYMPTOMS:   DEPRESSION:  reports-depressed mood, anhedonia, low energy, insomnia, appetite changes and poor concentration /memory;  DENIES- suicidal ideation, excessive guilt and psychomotor changes [slowing/fidgeting]  DEVAUGHN/HYPOMANIA:  reports-none;  DENIES- increased energy, decreased sleep need, increased activity and grandiosity  PSYCHOSIS:  reports-none;  DENIES- delusions, auditory hallucinations and visual hallucinations  DYSREGULATION:  reports-none;  DENIES- suicidal ideation, violent ideation and SIB  PANIC ATTACK:  none   ANXIETY:  excessive worry, social anxiety and nervous/overwhelmed  TRAUMA RELATED:  none  COMPULSIVE:  none  SLEEP:  as above    EATING DISORDER: none    RECENT SUBSTANCE USE:     ALCOHOL- occasional  TOBACCO- none            CAFFEINE- limited intake 2/2 anxiety               CANNABIS- none  OPIOIDS- none            NARCAN KIT- N/A       OTHER ILLICIT DRUGS- none     CURRENT SOCIAL HISTORY:  FINANCIAL SUPPORT- working       CHILDREN- none       LIVING SITUATION- lives alone in apartment      SOCIAL/ SPIRITUAL SUPPORT- friends, family, therapist      FEELS SAFE AT HOME- Yes     MEDICAL ROS:  Reports none Denies diarrhea, constipation, nausea, headaches, weight changes [increase, decrease], sedation, tremor, confusion, excessive diaphoresis, muscle twitches, bruxism, shiver and easy bruising, sexual side effects  and withdrawal  symptoms.    PSYCH and CD Critical Summary Points since July 2017 July-August: Tried 2 trials of increasing sertraline to 37.5mg with increased anxiety and anger within 24 hours; obtained genesight testing  September: Attempted to start vilazodone-no covered by insurance and appeal denied; started desvenalfaxine 25mg   October: Increased desvenlafaxine to 50mg daily to further target anxiety and depression  November: Increased desvenlafaxine to 75mg daily  December: switched back to Surphace  of Pristiq as he found other formulation less effective  February: Started slow taper of lorazepam  May: Increased desvenlafaxine to 100mg to further target anxiety and some depression symptoms    PAST PSYCH MED TRIALS   see EMR Problem List: Hx of psychiatric care    MEDICAL / SURGICAL HISTORY                                   CARE TEAM:    PCP- Dr. Brittany Penaloza   Therapist- Sofía Ramirez, MSW MPH LICSW    Patient Active Problem List   Diagnosis     Disturbance in sleep behavior     Generalized anxiety disorder     Insomnia     Panic disorder with agoraphobia     Vitamin D deficiency     Hx of psychiatric care     Moderate episode of recurrent major depressive disorder (H)     Vegan diet     Intrinsic eczema       ALLERGY                                Pineapple and Seasonal allergies  MEDICATIONS                               Current Outpatient Prescriptions   Medication Sig Dispense Refill     cetirizine (ZYRTEC) 10 MG tablet Take 10 mg by mouth       Cholecalciferol (VITAMIN D PO) Take by mouth daily       desvenlafaxine succinate (PRISTIQ) 100 MG 24 hr tablet Take 1 tablet (100 mg) by mouth daily 30 tablet 2     triamcinolone (KENALOG) 0.1 % ointment Apply topically 2 times daily Avoid face, underarms, groin 80 g 3     vitamin B complex with vitamin C (VITAMIN  B COMPLEX) TABS tablet Take 1 tablet by mouth daily       fluticasone (FLONASE) 50 MCG/ACT spray Spray 1 spray into both nostrils daily  (Patient not taking: Reported on 6/26/2018) 1 Bottle 3     LORazepam (ATIVAN) 0.5 MG tablet Take 0.5 tablets (0.25 mg) by mouth 2 times daily (Patient not taking: Reported on 6/26/2018) 30 tablet 0     vitamin D (ERGOCALCIFEROL) 43112 UNIT capsule Take 1 capsule (50,000 Units) by mouth every 7 days (Patient not taking: Reported on 5/22/2018) 8 capsule 0     VITALS   /76  Pulse 90  Wt 85.5 kg (188 lb 6.4 oz)  BMI 30.68 kg/m2   MENTAL STATUS EXAM                                                           Alertness: alert  and oriented  Appearance: well groomed and appropriately dressed for weather  Behavior/Demeanor: cooperative and pleasant, with good  eye contact   Speech: normal  Language: intact  Psychomotor:  normal or unremarkable  Mood: depressed and anxious but continues to improve and be more mild in presentation  Affect: full range; was congruent to mood; was congruent to content  Thought Process/Associations: unremarkable  Thought Content:  Reports none;  Denies suicidal ideation, violent ideation and delusions  Perception:  Reports none;  Denies auditory hallucinations and visual hallucinations  Insight: fair   Judgment: adequate for safety  Cognition: does  appear grossly intact; formal cognitive testing was not done    LABS and DATA     RATING SCALES:  none    PHQ9 TODAY = 9  PHQ-9 SCORE 3/14/2018 4/25/2018 5/22/2018   Total Score 9 9 8     DIAGNOSIS     WILLIS with agoraphobia (improving)  Major Depression Disorder, single episode, mild    ASSESSMENT                                     TODAY Subhash reports relative stability in mood symptoms, has been tolerating tapering off of scheduled lorazepam. Continues to have some anxiety at times but has been able to manage with other coping mechanisms and also able to process stressors within therapy sessions. Does understand he has lorazepam available PRN should anxiety increase and he have episodes of panic. No suicidal thoughts or safety concerns.  Advocates for no changes today which is reasonable. He will follow up in 4 weeks or sooner if needed.     CONTROLLED SUBSTANCE STATEMENT:  The use of Lorazepam (Ativan) is indicated and appropriate.  This regimen is both beneficial and well tolerated with no adverse effects or tolerance.  There is no evidence of abuse of this medication or other substances.  Warnings related to abuse potential, street value, adverse effects, abrupt cessation, withdrawal and need for emergent care have been discussed and are understood by the patient.  The patient has verbalized clear understanding of safety issues as well as the need to control use.  Plan to continue use at this time.   Controlled Substance Contract was not completed.    [place] PRESCRIPTION MONITORING PROGRAM [] was not checked today:  indicates pt only receiving prescription from this clinic, no early refills or evidence of misuse/abuse    PSYCHOTROPIC DRUG INTERACTIONS:   None.  MANAGEMENT:  N/A     PLAN                                                                                                       1) PSYCHOTROPIC MEDICATIONS:  - continue desvenlafaxine 100mg daily (prescribe [facility] )   - continue lorazepam 0.25mg BID PRN (has not taken in several weeks)    2) THERAPY:  Continue  TREATMENT PLAN   Date revised  -  6/26/18  Date next due- 9/26/18    3) NEXT DUE:    Labs- N/A  Rating Scales- N/A    4) REFERRALS:    NONE    5) RTC: 4 weeks    6) CRISIS NUMBERS:   Provided routinely in AVS.  Especially emphasized:  ONLY if a JOSTIN PT: [facility] [phone] (clinic), [phone] (after hours)      TREATMENT RISK STATEMENT:  The risks, benefits, alternatives and potential adverse effects have been discussed and are understood by the patient/ patient's guardian. The pt understands the risks of using street drugs or alcohol.  There are no medical contraindications, the pt agrees to treatment with the ability to do so.  The patient  understands to call 911 or come to the nearest ED if life threatening or urgent symptoms present.    PSYCHIATRY CLINIC INDIVIDUAL PSYCHOTHERAPY NOTE                                    16   Start time: 0830  End time: 0850  Subjective: This supportive psychotherapy session addressed issues related to goals of therapy and current stressors including relationships, work and school.  Patient's reaction: Preparatory in the context of mental status appropriate for ambulatory setting.  Progress: fair, updated goals today  Plan: RTC 4 weeks  Psychotherapy services during this visit included  myself and Subhash.   TREATMENT  PLAN          SYMPTOMS;PROBLEMS   MEASURABLE GOALS;    FUNCTIONAL IMPROVEMENT INTERVENTIONS;    GAINS MADE DISCHARGE CRITERIA   Anxiety: nervous/tense/restless/overwhelmed   Utilize mindfulness  -attend mindfulness/meditation classes 1x per month to continue practice and be intentional about incorporating mindfulness daily marked symptom improvement   Depression: depressed mood   improve/ maintain good physical health practices as a coping skill by exercising at least every other day and restart yoga practice in the New Year -currently getting exercise about 5x per week, wants to maintain at 5x; has been to yoga 2x in past three months, wants to have goal of going 2x per month  marked symptom improvement     RESIDENT:   She Rivera DO    Patient not staffed in clinic, note will be reviewed and signed by supervisor Dr. Michel.  I did not see this patient directly. I have reviewed the documentation and I agree with the resident's plan of care.     Alesia Michel MD

## 2018-06-26 NOTE — PATIENT INSTRUCTIONS
-continue current medication, will keep Ativan as needed for increasing anxiety/panic  -return to clinic in 4 weeks or sooner if needed

## 2018-06-27 ASSESSMENT — PATIENT HEALTH QUESTIONNAIRE - PHQ9: SUM OF ALL RESPONSES TO PHQ QUESTIONS 1-9: 9

## 2018-07-11 ENCOUNTER — OFFICE VISIT (OUTPATIENT)
Dept: DERMATOLOGY | Facility: CLINIC | Age: 28
End: 2018-07-11
Payer: COMMERCIAL

## 2018-07-11 DIAGNOSIS — B07.0 PLANTAR WART, LEFT FOOT: Primary | ICD-10-CM

## 2018-07-11 DIAGNOSIS — R21 RASH: ICD-10-CM

## 2018-07-11 ASSESSMENT — PAIN SCALES - GENERAL
PAINLEVEL: NO PAIN (0)
PAINLEVEL: NO PAIN (0)

## 2018-07-11 NOTE — LETTER
7/11/2018       RE: Hal Woo  [address]  [place] [address]     Dear Colleague,    Thank you for referring your patient, Hal Woo, to the [facility] DERMATOLOGY at [facility]. Please see a copy of my visit note below.    [facility] Dermatology Note      Dermatology Problem List:  1. Adult-onset atopic dermatitis. Primarily flexural involvement.   - desonide 0.05% ointment BID PRN  - triamcinolone 0.1% ointment BID PRN for flares  - CeraVe eczema moisturizer  2. Verruca plantaris s/p cryo   3. Rash left upper back s/p biopsy 7/11/18    Encounter Date: Jul 11, 2018    CC:  Chief Complaint   Patient presents with     Derm Problem     Hal is here regarding a rash on his back. He states that he has had it for a while.        History of Present Illness:  Mr. Hal Woo is a 28 year old male who presents in follow-up for his atopic dermatitis. He was last seen in the dermatology clinic on 3/28/2017 during which no major changes were made to the plan of care regarding his atopic dermatitis. Today the patient reports a rash on his back which he has had for the last year. Admits that it is pruritic. His parents saw it and thought that he might have shingles. He tried treating it with triamcinolone, which he said marginally improved it at best. The patient last used triamcinolone about a week ago.In addition to the rash on his back, the patient has another rash on his inner thighs which he would like discussed which does not resolve on its own. He also has what he believes is a wart on his foot which he would like frozen off. He has over the counter medication but has not used it yet. The patient says that he had moderate sun exposure throughout his lifetime.    Otherwise the patient is feeling well and reports no additional skin concerns.   Past Medical History:   Patient Active Problem List   Diagnosis     Disturbance in sleep  behavior     Generalized anxiety disorder     Insomnia     Panic disorder with agoraphobia     Vitamin D deficiency     Hx of psychiatric care     Moderate episode of recurrent major depressive disorder (H)     Vegan diet     Intrinsic eczema     Past Medical History:   Diagnosis Date     Depression, major      Severe anxiety with panic 09/2014     Past Surgical History:   Procedure Laterality Date     HC REMOVAL ADENOIDS,PRIMARY,<11 Y/O       HERNIA REPAIR      infancy     MANDIBLE SURGERY       NOSE SURGERY      deviated septum       Social History:  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    Family History:  There is no family history of skin cancer.    Medications:  Current Outpatient Prescriptions   Medication Sig Dispense Refill     cetirizine (ZYRTEC) 10 MG tablet Take 10 mg by mouth       Cholecalciferol (VITAMIN D PO) Take by mouth daily       desvenlafaxine succinate (PRISTIQ) 100 MG 24 hr tablet Take 1 tablet (100 mg) by mouth daily 30 tablet 2     fluticasone (FLONASE) 50 MCG/ACT spray Spray 1 spray into both nostrils daily (Patient not taking: Reported on 6/26/2018) 1 Bottle 3     LORazepam (ATIVAN) 0.5 MG tablet Take 0.5 tablets (0.25 mg) by mouth 2 times daily (Patient not taking: Reported on 6/26/2018) 30 tablet 0     triamcinolone (KENALOG) 0.1 % ointment Apply topically 2 times daily Avoid face, underarms, groin 80 g 3     vitamin B complex with vitamin C (VITAMIN  B COMPLEX) TABS tablet Take 1 tablet by mouth daily       vitamin D (ERGOCALCIFEROL) 06283 UNIT capsule Take 1 capsule (50,000 Units) by mouth every 7 days (Patient not taking: Reported on 5/22/2018) 8 capsule 0     Allergies   Allergen Reactions     Pineapple Difficulty breathing, Hives, Itching, Rash and Shortness Of Breath     Seasonal Allergies          Review of Systems:  -As per HPI  -Constitutional: The patient is feeling generally well.  -Skin: As above in HPI.  No additional skin concerns.    Physical exam:  Vitals: There were no vitals taken for this visit.  GEN: This is a well developed, well-nourished male in no acute distress, in a pleasant mood.    SKIN: Total skin excluding the undergarment areas was performed. The exam included the head/face, neck, both arms, chest, back, abdomen, both legs, digits and/or nails.   -Pink scaly papules coalescing into plaques on the left upper back, bilateral lower back, and bilateral mid back as well as the left inner upper thigh with some hyperpigmentation  - There is a 6 mm verrucous papule with thrombosed capillaries interrupting dermatoglyphics on the left heel.  - No other lesions of concern on areas examined.     Impression/Plan:    1.  History of atopic dermatitis. Unresponsive to treatment; r/o other causes including MF vs contact dermatitis    Continue CeraVe anti-itch moisturizer BID    Continue desonide 0.05% ointment BID PRNbody for mild eczema    Continue triamcinolone 0.1% ointment BID PRN to face or body for moderate or severe eczema. Advised to limit exposure to face to no more than 1-2 weeks to limit risk of atrophy. OK to apply to body for extended periods.     After discussion of benefits and risks including but not limited to bleeding, infection, scar, incomplete removal, recurrence, and non-diagnostic biopsy, written consent and photographs were obtained. The area was cleaned with isopropyl alcohol. 0.5 mL of 1% lidocaine was injected to obtain adequate anesthesia of the lesion on the left upper back. A 4 mm punch biopsy was performed.  4-0 Nylon sutures were utilized to approximate the epidermal edges.  White petroleum jelly/VaselineTM and a bandage was applied to the wound.  Explicit verbal and written wound care instructions were provided.  The patient left the Dermatology Clinic in good condition.    2. Verruca plantaris, left heel  Cryotherapy procedure note: After verbal consent and discussion of risks and  benefits including but no limited to dyspigmentation/scar, blister, and pain, 1 was(were) treated with 1-2mm freeze border for 2 cycles with liquid nitrogen. Post cryotherapy instructions were provided.     Start salicylic acid OTC. Apply daily.    Follow-up in two weeks for suture removal/discussion of biopsy results.     Staff Involved:  Scribe/Staff    Scribe Disclosure:  I, Bran Azar, am serving as a scribe to document services personally performed by Dr. Alycia Madrigal PA-C, based on data collection and the provider's statements to me.     Provider Disclosure:   The documentation recorded by the scribe accurately reflects the services I personally performed and the decisions made by me.    All risks, benefits and alternatives were discussed with patient.  Patient is in agreement and understands the assessment and plan.  All questions were answered.  Sun Screen Education was given.   Return to Clinic in 2 weeks or sooner as needed.     Again, thank you for allowing me to participate in the care of your patient.      Sincerely,    Alycia Madrigal PA-C

## 2018-07-11 NOTE — MR AVS SNAPSHOT
After Visit Summary   7/11/2018    Hal Woo    MRN: [id number]           Patient Information     Date Of Birth          1990        Visit Information        Provider Department      7/11/2018 7:45 AM Alycia Madrigal PA-C [facility]        Today's Diagnoses     Plantar wart, left foot    -  1    Rash          Care Instructions    Cryotherapy    What is it?    Use of a very cold liquid, such as liquid nitrogen, to freeze and destroy abnormal skin cells that need to be removed    What should I expect?    Tenderness and redness    A small blister that might grow and fill with dark purple blood. There may be crusting.    More than one treatment may be needed if the lesions do not go away.    How do I care for the treated area?    Gently wash the area with your hands when bathing.    Use a thin layer of Vaseline to help with healing. You may use a Band-Aid.     The area should heal within 7-10 days and may leave behind a pink or lighter color.     Do not use an antibiotic or Neosporin ointment.     You may take acetaminophen (Tylenol) for pain.     Call your Doctor if you have:    Severe pain    Signs of infection (warmth, redness, cloudy yellow drainage, and or a bad smell)    Questions or concerns    Who should I call with questions?       [facility]: [phone]       [facility]: [phone]       For urgent needs outside of business hours call the [facility] at [phone]        and ask for the dermatology resident on call      Wound Care After a Biopsy    What is a skin biopsy?  A skin biopsy allows the doctor to examine a very small piece of tissue under the microscope to determine the diagnosis and the best treatment for the skin condition. A local anesthetic (numbing medicine)  is injected with a very small needle into the skin area to be tested. A small piece of skin is taken from the area.  Sometimes a suture (stitch) is used.     What are the risks of a skin biopsy?  I will experience scar, bleeding, swelling, pain, crusting and redness. I may experience incomplete removal or recurrence. Risks of this procedure are excessive bleeding, bruising, infection, nerve damage, numbness, thick (hypertrophic or keloidal) scar and non-diagnostic biopsy.    How should I care for my wound for the first 24 hours?    Keep the wound dry and covered for 24 hours    If it bleeds, hold direct pressure on the area for 15 minutes. If bleeding does not stop then go to the emergency room    Avoid strenuous exercise the first 1-2 days or as your doctor instructs you    How should I care for the wound after 24 hours?    After 24 hours, remove the bandage    You may bathe or shower as normal    If you had a scalp biopsy, you can shampoo as usual and can use shower water to clean the biopsy site daily    Clean the wound twice a day with gentle soap and water    Do not scrub, be gentle    Apply white petroleum/Vaseline after cleaning the wound with a cotton swab or a clean finger, and keep the site covered with a Bandaid /bandage. Bandages are not necessary with a scalp biopsy    If you are unable to cover the site with a Bandaid /bandage, re-apply ointment 2-3 times a day to keep the site moist. Moisture will help with healing    Avoid strenuous activity for first 1-2 days    Avoid lakes, rivers, pools, and oceans until the stitches are removed or the site is healed    How do I clean my wound?    Wash hands thoroughly with soap or use hand  before all wound care    Clean the wound with gentle soap and water    Apply white petroleum/Vaseline  to wound after it is clean    Replace the Bandaid /bandage to keep the wound covered for the first few days or as instructed by your doctor    If you had a scalp biopsy, warm shower water to the area on a daily basis should suffice    What should I use to clean my wound?      Cotton-tipped applicators (Qtips )    White petroleum jelly (Vaseline ). Use a clean new container and use Q-tips to apply.    Bandaids   as needed    Gentle soap     How should I care for my wound long term?    Do not get your wound dirty    Keep up with wound care for one week or until the area is healed.    A small scab will form and fall off by itself when the area is completely healed. The area will be red and will become pink in color as it heals. Sun protection is very important for how your scar will turn out. Sunscreen with an SPF 30 or greater is recommended once the area is healed.    If you have stitches, stitches need to be removed in 14 days. You may return to our clinic for this or you may have it done locally at your doctor s office.    You should have some soreness but it should be mild and slowly go away over several days. Talk to your doctor about using tylenol for pain,    When should I call my doctor?  If you have increased:     Pain or swelling    Pus or drainage (clear or slightly yellow drainage is ok)    Temperature over 100F    Spreading redness or warmth around wound    When will I hear about my results?  The biopsy results can take 2-3 weeks to come back. The clinic will call you with the results, send you a [employer] message, or have you schedule a follow-up clinic or phone time to discuss the results. Contact our clinics if you do not hear from us in 3 weeks.     Who should I call with questions?    [facility]: [phone]     [facility]: [phone]    For urgent needs outside of business hours call the [facility] at [phone] and ask for the dermatology resident on call              Follow-ups after your visit        Your next 10 appointments already scheduled     Jul 25, 2018  7:45 AM CDT   (Arrive by 7:30 AM)   Return Visit with SENA Martinez [facility]    [address]  [address] [facility] [address] [phone]            Aug 02, 2018  8:30 AM CDT   Adult Med Follow UP with She Rivera MD   Psychiatry Clinic ([facility])    [address]  [address] [id number] [phone]              Who to contact     Please call your clinic at [phone] to:    Ask questions about your health    Make or cancel appointments    Discuss your medicines    Learn about your test results    Speak to your doctor            Additional Information About Your Visit        [employer] Information     [email or web address] gives you secure access to your electronic health record. If you see a primary care provider, you can also send messages to your care team and make appointments. If you have questions, please call your primary care clinic.  If you do not have a primary care provider, please call [phone] and they will assist you.      [employer] is an electronic gateway that provides easy, online access to your medical records. With [employer], you can request a clinic appointment, read your test results, renew a prescription or communicate with your care team.     To access your existing account, please contact your [facility] or call [phone] for assistance.        Care EveryWhere ID     This is your Care EveryWhere ID. This could be used by other organizations to access your [place] medical records  [id number]         Blood Pressure from Last 3 Encounters:   02/06/18 128/75   02/02/18 125/75   09/12/17 122/72    Weight from Last 3 Encounters:   02/06/18 86.6 kg (191 lb)   02/02/18 92.5 kg (204 lb)   09/12/17 92.4 kg (203 lb 13 oz)              We Performed the Following     BIOPSY SKIN/SUBQ/MUC MEM, SINGLE LESION     Dermatological path order and indications     DESTRUCT BENIGN LESION, UP TO 14        Primary Care Provider Office Phone # Fax #    Leona BRUCE  SENA Leon [phone]       [address] [place] [address]        Equal Access to Services     VAMSHI DODSON : Hadii aad ku hadvincentsarbjit Buchanan, kellienenita felicianoluisha, padmini ariemaliha dong, rich sorto harleenamber servin laLexusrobert garcía. So [facility] [phone].    ATENCIÓN: Si habla español, tiene a anne disposición servicios gratuitos de asistencia lingüística. Llame al [phone].    We comply with applicable federal civil rights laws and [place] laws. We do not discriminate on the basis of race, color, national origin, age, disability, sex, sexual orientation, or gender identity.            Thank you!     Thank you for choosing [facility]  for your care. Our goal is always to provide you with excellent care. Hearing back from our patients is one way we can continue to improve our services. Please take a few minutes to complete the written survey that you may receive in the mail after your visit with us. Thank you!             Your Updated Medication List - Protect others around you: Learn how to safely use, store and throw away your medicines at www.disposemymeds.org.          This list is accurate as of 7/11/18  8:42 AM.  Always use your most recent med list.                   Brand Name Dispense Instructions for use Diagnosis    cetirizine 10 MG tablet    zyrTEC     Take 10 mg by mouth        desvenlafaxine succinate 100 MG 24 hr tablet    PRISTIQ    30 tablet    Take 1 tablet (100 mg) by mouth daily    WILLIS (generalized anxiety disorder)       fluticasone 50 MCG/ACT spray    FLONASE    1 Bottle    Spray 1 spray into both nostrils daily    Acute seasonal allergic rhinitis due to pollen       LORazepam 0.5 MG tablet    ATIVAN    30 tablet    Take 0.5 tablets (0.25 mg) by mouth 2 times daily    WILLIS (generalized anxiety disorder)       triamcinolone 0.1 % ointment    KENALOG    80 g    Apply topically 2 times daily Avoid face, underarms, groin    Other atopic dermatitis       vitamin B  complex with vitamin C Tabs tablet      Take 1 tablet by mouth daily        vitamin D 88894 UNIT capsule    ERGOCALCIFEROL    8 capsule    Take 1 capsule (50,000 Units) by mouth every 7 days    Vitamin D deficiency       VITAMIN D PO      Take by mouth daily

## 2018-07-11 NOTE — NURSING NOTE
Dermatology Rooming Note    Hal Woo's goals for this visit include:   Chief Complaint   Patient presents with     Derm Problem     Hal is here regarding a rash on his back. He states that he has had it for a while. He would also like to have a wart on his foot removed     Crystal Lancaster LPN

## 2018-07-11 NOTE — NURSING NOTE
Lidocaine 1 % injection   1.5mL once for one use, starting 7/11/2018 ending 7/11/2018,  2mL disp, R-0, injection  Injected by Crystal Lancaster LPN

## 2018-07-11 NOTE — PATIENT INSTRUCTIONS
Cryotherapy    What is it?    Use of a very cold liquid, such as liquid nitrogen, to freeze and destroy abnormal skin cells that need to be removed    What should I expect?    Tenderness and redness    A small blister that might grow and fill with dark purple blood. There may be crusting.    More than one treatment may be needed if the lesions do not go away.    How do I care for the treated area?    Gently wash the area with your hands when bathing.    Use a thin layer of Vaseline to help with healing. You may use a Band-Aid.     The area should heal within 7-10 days and may leave behind a pink or lighter color.     Do not use an antibiotic or Neosporin ointment.     You may take acetaminophen (Tylenol) for pain.     Call your Doctor if you have:    Severe pain    Signs of infection (warmth, redness, cloudy yellow drainage, and or a bad smell)    Questions or concerns    Who should I call with questions?       [facility]: [phone]       [facility]: [phone]       For urgent needs outside of business hours call the [facility] at [phone]        and ask for the dermatology resident on call      Wound Care After a Biopsy    What is a skin biopsy?  A skin biopsy allows the doctor to examine a very small piece of tissue under the microscope to determine the diagnosis and the best treatment for the skin condition. A local anesthetic (numbing medicine)  is injected with a very small needle into the skin area to be tested. A small piece of skin is taken from the area. Sometimes a suture (stitch) is used.     What are the risks of a skin biopsy?  I will experience scar, bleeding, swelling, pain, crusting and redness. I may experience incomplete removal or recurrence. Risks of this procedure are excessive bleeding, bruising, infection, nerve damage, numbness, thick (hypertrophic or keloidal) scar and non-diagnostic biopsy.    How should I care  for my wound for the first 24 hours?    Keep the wound dry and covered for 24 hours    If it bleeds, hold direct pressure on the area for 15 minutes. If bleeding does not stop then go to the emergency room    Avoid strenuous exercise the first 1-2 days or as your doctor instructs you    How should I care for the wound after 24 hours?    After 24 hours, remove the bandage    You may bathe or shower as normal    If you had a scalp biopsy, you can shampoo as usual and can use shower water to clean the biopsy site daily    Clean the wound twice a day with gentle soap and water    Do not scrub, be gentle    Apply white petroleum/Vaseline after cleaning the wound with a cotton swab or a clean finger, and keep the site covered with a Bandaid /bandage. Bandages are not necessary with a scalp biopsy    If you are unable to cover the site with a Bandaid /bandage, re-apply ointment 2-3 times a day to keep the site moist. Moisture will help with healing    Avoid strenuous activity for first 1-2 days    Avoid lakes, rivers, pools, and oceans until the stitches are removed or the site is healed    How do I clean my wound?    Wash hands thoroughly with soap or use hand  before all wound care    Clean the wound with gentle soap and water    Apply white petroleum/Vaseline  to wound after it is clean    Replace the Bandaid /bandage to keep the wound covered for the first few days or as instructed by your doctor    If you had a scalp biopsy, warm shower water to the area on a daily basis should suffice    What should I use to clean my wound?     Cotton-tipped applicators (Qtips )    White petroleum jelly (Vaseline ). Use a clean new container and use Q-tips to apply.    Bandaids   as needed    Gentle soap     How should I care for my wound long term?    Do not get your wound dirty    Keep up with wound care for one week or until the area is healed.    A small scab will form and fall off by itself when the area is completely  healed. The area will be red and will become pink in color as it heals. Sun protection is very important for how your scar will turn out. Sunscreen with an SPF 30 or greater is recommended once the area is healed.    If you have stitches, stitches need to be removed in 14 days. You may return to our clinic for this or you may have it done locally at your doctor s office.    You should have some soreness but it should be mild and slowly go away over several days. Talk to your doctor about using tylenol for pain,    When should I call my doctor?  If you have increased:     Pain or swelling    Pus or drainage (clear or slightly yellow drainage is ok)    Temperature over 100F    Spreading redness or warmth around wound    When will I hear about my results?  The biopsy results can take 2-3 weeks to come back. The clinic will call you with the results, send you a [employer] message, or have you schedule a follow-up clinic or phone time to discuss the results. Contact our clinics if you do not hear from us in 3 weeks.     Who should I call with questions?    [facility]: [phone]     [facility]: [phone]    For urgent needs outside of business hours call the [facility] at [phone] and ask for the dermatology resident on call

## 2018-07-11 NOTE — PROGRESS NOTES
[facility] Dermatology Note      Dermatology Problem List:  1. Adult-onset atopic dermatitis. Primarily flexural involvement.   - desonide 0.05% ointment BID PRN  - triamcinolone 0.1% ointment BID PRN for flares  - CeraVe eczema moisturizer  2. Verruca plantaris s/p cryo   3. Rash left upper back s/p biopsy 7/11/18    Encounter Date: Jul 11, 2018    CC:  Chief Complaint   Patient presents with     Derm Problem     Hal is here regarding a rash on his back. He states that he has had it for a while.        History of Present Illness:  Mr. Hal Woo is a 28 year old male who presents in follow-up for his atopic dermatitis. He was last seen in the dermatology clinic on 3/28/2017 during which no major changes were made to the plan of care regarding his atopic dermatitis. Today the patient reports a rash on his back which he has had for the last year. Admits that it is pruritic. His parents saw it and thought that he might have shingles. He tried treating it with triamcinolone, which he said marginally improved it at best. The patient last used triamcinolone about a week ago.In addition to the rash on his back, the patient has another rash on his inner thighs which he would like discussed which does not resolve on its own. He also has what he believes is a wart on his foot which he would like frozen off. He has over the counter medication but has not used it yet. The patient says that he had moderate sun exposure throughout his lifetime.    Otherwise the patient is feeling well and reports no additional skin concerns.   Past Medical History:   Patient Active Problem List   Diagnosis     Disturbance in sleep behavior     Generalized anxiety disorder     Insomnia     Panic disorder with agoraphobia     Vitamin D deficiency     Hx of psychiatric care     Moderate episode of recurrent major depressive disorder (H)     Vegan diet     Intrinsic eczema     Past Medical History:   Diagnosis Date      Depression, major      Severe anxiety with panic 09/2014     Past Surgical History:   Procedure Laterality Date     HC REMOVAL ADENOIDS,PRIMARY,<13 Y/O       HERNIA REPAIR      infancy     MANDIBLE SURGERY       NOSE SURGERY      deviated septum       Social History:  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    Family History:  There is no family history of skin cancer.    Medications:  Current Outpatient Prescriptions   Medication Sig Dispense Refill     cetirizine (ZYRTEC) 10 MG tablet Take 10 mg by mouth       Cholecalciferol (VITAMIN D PO) Take by mouth daily       desvenlafaxine succinate (PRISTIQ) 100 MG 24 hr tablet Take 1 tablet (100 mg) by mouth daily 30 tablet 2     fluticasone (FLONASE) 50 MCG/ACT spray Spray 1 spray into both nostrils daily (Patient not taking: Reported on 6/26/2018) 1 Bottle 3     LORazepam (ATIVAN) 0.5 MG tablet Take 0.5 tablets (0.25 mg) by mouth 2 times daily (Patient not taking: Reported on 6/26/2018) 30 tablet 0     triamcinolone (KENALOG) 0.1 % ointment Apply topically 2 times daily Avoid face, underarms, groin 80 g 3     vitamin B complex with vitamin C (VITAMIN  B COMPLEX) TABS tablet Take 1 tablet by mouth daily       vitamin D (ERGOCALCIFEROL) 17762 UNIT capsule Take 1 capsule (50,000 Units) by mouth every 7 days (Patient not taking: Reported on 5/22/2018) 8 capsule 0     Allergies   Allergen Reactions     Pineapple Difficulty breathing, Hives, Itching, Rash and Shortness Of Breath     Seasonal Allergies          Review of Systems:  -As per HPI  -Constitutional: The patient is feeling generally well.  -Skin: As above in HPI. No additional skin concerns.    Physical exam:  Vitals: There were no vitals taken for this visit.  GEN: This is a well developed, well-nourished male in no acute distress, in a pleasant mood.    SKIN: Total skin excluding the undergarment areas was performed. The exam included the  head/face, neck, both arms, chest, back, abdomen, both legs, digits and/or nails.   -Pink scaly papules coalescing into plaques on the left upper back, bilateral lower back, and bilateral mid back as well as the left inner upper thigh with some hyperpigmentation  - There is a 6 mm verrucous papule with thrombosed capillaries interrupting dermatoglyphics on the left heel.  - No other lesions of concern on areas examined.     Impression/Plan:    1.  History of atopic dermatitis. Unresponsive to treatment; r/o other causes including MF vs contact dermatitis    Continue CeraVe anti-itch moisturizer BID    Continue desonide 0.05% ointment BID PRNbody for mild eczema    Continue triamcinolone 0.1% ointment BID PRN to face or body for moderate or severe eczema. Advised to limit exposure to face to no more than 1-2 weeks to limit risk of atrophy. OK to apply to body for extended periods.     After discussion of benefits and risks including but not limited to bleeding, infection, scar, incomplete removal, recurrence, and non-diagnostic biopsy, written consent and photographs were obtained. The area was cleaned with isopropyl alcohol. 0.5 mL of 1% lidocaine was injected to obtain adequate anesthesia of the lesion on the left upper back. A 4 mm punch biopsy was performed.  4-0 Nylon sutures were utilized to approximate the epidermal edges.  White petroleum jelly/VaselineTM and a bandage was applied to the wound.  Explicit verbal and written wound care instructions were provided.  The patient left the Dermatology Clinic in good condition.    2. Verruca plantaris, left heel  Cryotherapy procedure note: After verbal consent and discussion of risks and benefits including but no limited to dyspigmentation/scar, blister, and pain, 1 was(were) treated with 1-2mm freeze border for 2 cycles with liquid nitrogen. Post cryotherapy instructions were provided.     Start salicylic acid OTC. Apply daily.    Follow-up in two weeks for suture  removal/discussion of biopsy results.     Staff Involved:  Scribe/Staff    Scribe Disclosure:  I, Bran Azar, am serving as a scribe to document services personally performed by Dr. Alycia Madrigal PA-C, based on data collection and the provider's statements to me.     Provider Disclosure:   The documentation recorded by the scribe accurately reflects the services I personally performed and the decisions made by me.    All risks, benefits and alternatives were discussed with patient.  Patient is in agreement and understands the assessment and plan.  All questions were answered.  Sun Screen Education was given.   Return to Clinic in 2 weeks or sooner as needed.   Alycia Madrigal PA-C   [facility]

## 2018-07-18 LAB — COPATH REPORT: NORMAL

## 2018-07-19 DIAGNOSIS — L20.84 INTRINSIC ATOPIC DERMATITIS: Primary | ICD-10-CM

## 2018-07-19 RX ORDER — FLUOCINONIDE 0.5 MG/G
OINTMENT TOPICAL 2 TIMES DAILY
Qty: 120 G | Refills: 1 | Status: SHIPPED | OUTPATIENT
Start: 2018-07-19 | End: 2021-12-06

## 2018-07-25 ENCOUNTER — OFFICE VISIT (OUTPATIENT)
Dept: DERMATOLOGY | Facility: CLINIC | Age: 28
End: 2018-07-25
Payer: COMMERCIAL

## 2018-07-25 DIAGNOSIS — L20.84 INTRINSIC ECZEMA: ICD-10-CM

## 2018-07-25 DIAGNOSIS — B07.0 PLANTAR WART, LEFT FOOT: Primary | ICD-10-CM

## 2018-07-25 ASSESSMENT — PAIN SCALES - GENERAL: PAINLEVEL: NO PAIN (0)

## 2018-07-25 NOTE — PROGRESS NOTES
[facility] Dermatology Note      Dermatology Problem List:  1. Adult-onset atopic dermatitis. Primarily flexural involvement.   - desonide 0.05% ointment BID PRN  - triamcinolone 0.1% ointment BID PRN for flares  - CeraVe eczema moisturizer  2. Verruca plantaris s/p cryo   3. Rash left upper back s/p biopsy 7/11/18  -eczema    Encounter Date: Jul 25, 2018    CC:  Chief Complaint   Patient presents with     Derm Problem     Subhash is here today for a rash follow up, and for a suture removal.        History of Present Illness:  Mr. Hal Woo is a 28 year old male who presents in follow-up for his atopic dermatitis. He was last seen in the dermatology clinic on 7/11/2018 during which 1 wart on his left foot was treated with cryo and a biopsy was taken from a rash on his back. The biopsy returned from pathology as subacute spongiotic dermatitis. The patient was then prescribed fluocinonide 0.05% ointment to apply BID for two weeks.     Today the patient reports the the wart on his heel has improved- he is unsure if the wart is fully gone. He noted a small improvement in the rash on his back. However, he expressed difficulty caring for the biopsy site as it was difficult to reach.  He has questions on what a back applicator looks like and how it is used.    He uses dove soap in the shower. He also presents with a site of irritation along the waist line. There is a wart on his foot that he would like to have refrozen today. The patient says that he had moderate sun exposure throughout his lifetime.    Otherwise the patient reports no additional painful, bleeding, nonhealing or pruritic lesions and denies any new or changing moles.    Past Medical History:   Patient Active Problem List   Diagnosis     Disturbance in sleep behavior     Generalized anxiety disorder     Insomnia     Panic disorder with agoraphobia     Vitamin D deficiency     Hx of psychiatric care     Moderate episode of recurrent  major depressive disorder (H)     Vegan diet     Intrinsic eczema     Past Medical History:   Diagnosis Date     Depression, major      Severe anxiety with panic 09/2014     Past Surgical History:   Procedure Laterality Date     HC REMOVAL ADENOIDS,PRIMARY,<13 Y/O       HERNIA REPAIR      infancy     MANDIBLE SURGERY       NOSE SURGERY      deviated septum       Social History:  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    Family History:  There is no family history of skin cancer.    Medications:  Current Outpatient Prescriptions   Medication Sig Dispense Refill     cetirizine (ZYRTEC) 10 MG tablet Take 10 mg by mouth       Cholecalciferol (VITAMIN D PO) Take by mouth daily       desvenlafaxine succinate (PRISTIQ) 100 MG 24 hr tablet Take 1 tablet (100 mg) by mouth daily 30 tablet 2     fluocinonide (LIDEX) 0.05 % ointment Apply topically 2 times daily To affected areas of eczema (avoid face, groin and axilla), for approx 2 weeks 120 g 1     LORazepam (ATIVAN) 0.5 MG tablet Take 0.5 tablets (0.25 mg) by mouth 2 times daily 30 tablet 0     triamcinolone (KENALOG) 0.1 % ointment Apply topically 2 times daily Avoid face, underarms, groin 80 g 3     vitamin B complex with vitamin C (VITAMIN  B COMPLEX) TABS tablet Take 1 tablet by mouth daily       vitamin D (ERGOCALCIFEROL) 87342 UNIT capsule Take 1 capsule (50,000 Units) by mouth every 7 days 8 capsule 0     fluticasone (FLONASE) 50 MCG/ACT spray Spray 1 spray into both nostrils daily (Patient not taking: Reported on 6/26/2018) 1 Bottle 3     Allergies   Allergen Reactions     Pineapple Difficulty breathing, Hives, Itching, Rash and Shortness Of Breath     Seasonal Allergies          Review of Systems:  -Constitutional: The patient is feeling generally well.  -Skin: As above in HPI. No additional skin concerns.    Physical exam:  Vitals: There were no vitals taken for this visit.  GEN: This is a well  developed, well-nourished male in no acute distress, in a pleasant mood.    SKIN:  Focused examination of the left heel and trunk was performed.  - Pink scaly papules coalescing into plaques on the left upper back, bilateral lower back, and bilateral mid back as well as the left inner upper thigh with some hyperpigmentation, slightly thinner and less erythemetous  - There is a 6 mm verrucous papule with thrombosed capillaries interrupting dermatoglyphics on the left heel.  - No other lesions of concern on areas examined.     Impression/Plan:    1.  Atopic dermatitis, consistent with biopsy performed two weeks ago    Continue CeraVe anti-itch moisturizer BID    Continue fluocinide 0.05% ointment BID PRN body     Continue triamcinolone 0.1% ointment BID PRN to face or body for moderate or severe eczema. Advised to limit exposure to face to no more than 1-2 weeks to limit risk of atrophy. OK to apply to body for extended periods.     Sutures seemed to have come out on their own- no removal required at this visit.     2. Verruca plantaris, left heel  Lesions were pared with blade prior to cryotherapy  Cryotherapy procedure note: After verbal consent and discussion of risks and benefits including but no limited to dyspigmentation/scar, blister, and pain, 1 was(were) treated with 1-2mm freeze border for 3 cycles with liquid nitrogen. Post cryotherapy instructions were provided.     Continue salicylic acid OTC. Apply daily as desired.    Patient differs follow up    Recommend follow-up in 1 month, or sooner for worsening or changing. However, the patient defers a follow up at this time.      Staff Involved:  Scribe/Staff    Scribe Disclosure:   DONOVAN, Ana Maria Harris, am serving as a scribe to document services personally performed by Alycia Madrigal PA-C, based on data collection and the provider's statements to me.    Provider Disclosure:   The documentation recorded by the scribe accurately reflects the services I personally  performed and the decisions made by me.    All risks, benefits and alternatives were discussed with patient.  Patient is in agreement and understands the assessment and plan.  All questions were answered.  Sun Screen Education was given.   Return to Clinic in 1 months or sooner as needed.   Alycia Madrigal PA-C   [facility]

## 2018-07-25 NOTE — LETTER
7/25/2018       RE: Hal Woo  [address]  [place] [address]     Dear Colleague,    Thank you for referring your patient, Hal Woo, to the [facility] DERMATOLOGY at [facility]. Please see a copy of my visit note below.    [facility] Dermatology Note      Dermatology Problem List:  1. Adult-onset atopic dermatitis. Primarily flexural involvement.   - desonide 0.05% ointment BID PRN  - triamcinolone 0.1% ointment BID PRN for flares  - CeraVe eczema moisturizer  2. Verruca plantaris s/p cryo   3. Rash left upper back s/p biopsy 7/11/18  -eczema    Encounter Date: Jul 25, 2018    CC:  Chief Complaint   Patient presents with     Derm Problem     Subhash is here today for a rash follow up, and for a suture removal.        History of Present Illness:  Mr. Hal Woo is a 28 year old male who presents in follow-up for his atopic dermatitis. He was last seen in the dermatology clinic on 7/11/2018 during which 1 wart on his left foot was treated with cryo and a biopsy was taken from a rash on his back. The biopsy returned from pathology as subacute spongiotic dermatitis. The patient was then prescribed fluocinonide 0.05% ointment to apply BID for two weeks.     Today the patient reports the the wart on his heel has improved- he is unsure if the wart is fully gone. He noted a small improvement in the rash on his back. However, he expressed difficulty caring for the biopsy site as it was difficult to reach.  He has questions on what a back applicator looks like and how it is used.    He uses dove soap in the shower. He also presents with a site of irritation along the waist line. There is a wart on his foot that he would like to have refrozen today. The patient says that he had moderate sun exposure throughout his lifetime.    Otherwise the patient reports no additional painful, bleeding, nonhealing or pruritic lesions and denies any new  or changing moles.    Past Medical History:   Patient Active Problem List   Diagnosis     Disturbance in sleep behavior     Generalized anxiety disorder     Insomnia     Panic disorder with agoraphobia     Vitamin D deficiency     Hx of psychiatric care     Moderate episode of recurrent major depressive disorder (H)     Vegan diet     Intrinsic eczema     Past Medical History:   Diagnosis Date     Depression, major      Severe anxiety with panic 09/2014     Past Surgical History:   Procedure Laterality Date     HC REMOVAL ADENOIDS,PRIMARY,<13 Y/O       HERNIA REPAIR      infancy     MANDIBLE SURGERY       NOSE SURGERY      deviated septum       Social History:  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    Family History:  There is no family history of skin cancer.    Medications:  Current Outpatient Prescriptions   Medication Sig Dispense Refill     cetirizine (ZYRTEC) 10 MG tablet Take 10 mg by mouth       Cholecalciferol (VITAMIN D PO) Take by mouth daily       desvenlafaxine succinate (PRISTIQ) 100 MG 24 hr tablet Take 1 tablet (100 mg) by mouth daily 30 tablet 2     fluocinonide (LIDEX) 0.05 % ointment Apply topically 2 times daily To affected areas of eczema (avoid face, groin and axilla), for approx 2 weeks 120 g 1     LORazepam (ATIVAN) 0.5 MG tablet Take 0.5 tablets (0.25 mg) by mouth 2 times daily 30 tablet 0     triamcinolone (KENALOG) 0.1 % ointment Apply topically 2 times daily Avoid face, underarms, groin 80 g 3     vitamin B complex with vitamin C (VITAMIN  B COMPLEX) TABS tablet Take 1 tablet by mouth daily       vitamin D (ERGOCALCIFEROL) 09686 UNIT capsule Take 1 capsule (50,000 Units) by mouth every 7 days 8 capsule 0     fluticasone (FLONASE) 50 MCG/ACT spray Spray 1 spray into both nostrils daily (Patient not taking: Reported on 6/26/2018) 1 Bottle 3     Allergies   Allergen Reactions     Pineapple Difficulty breathing, Hives,  Itching, Rash and Shortness Of Breath     Seasonal Allergies          Review of Systems:  -Constitutional: The patient is feeling generally well.  -Skin: As above in HPI. No additional skin concerns.    Physical exam:  Vitals: There were no vitals taken for this visit.  GEN: This is a well developed, well-nourished male in no acute distress, in a pleasant mood.    SKIN:  Focused examination of the left heel and trunk was performed.  - Pink scaly papules coalescing into plaques on the left upper back, bilateral lower back, and bilateral mid back as well as the left inner upper thigh with some hyperpigmentation, slightly thinner and less erythemetous  - There is a 6 mm verrucous papule with thrombosed capillaries interrupting dermatoglyphics on the left heel.  - No other lesions of concern on areas examined.     Impression/Plan:    1.  Atopic dermatitis, consistent with biopsy performed two weeks ago    Continue CeraVe anti-itch moisturizer BID    Continue fluocinide 0.05% ointment BID PRN body     Continue triamcinolone 0.1% ointment BID PRN to face or body for moderate or severe eczema. Advised to limit exposure to face to no more than 1-2 weeks to limit risk of atrophy. OK to apply to body for extended periods.     Sutures seemed to have come out on their own- no removal required at this visit.     2. Verruca plantaris, left heel  Lesions were pared with blade prior to cryotherapy  Cryotherapy procedure note: After verbal consent and discussion of risks and benefits including but no limited to dyspigmentation/scar, blister, and pain, 1 was(were) treated with 1-2mm freeze border for 3 cycles with liquid nitrogen. Post cryotherapy instructions were provided.     Continue salicylic acid OTC. Apply daily as desired.    Patient differs follow up    Recommend follow-up in 1 month, or sooner for worsening or changing. However, the patient defers a follow up at this time.      Staff Involved:  Scribe/Staff    Scribe  Disclosure:   I, Ana Maria Harris, am serving as a scribe to document services personally performed by Alycia Madrigal PA-C, based on data collection and the provider's statements to me.    Provider Disclosure:   The documentation recorded by the scribe accurately reflects the services I personally performed and the decisions made by me.    All risks, benefits and alternatives were discussed with patient.  Patient is in agreement and understands the assessment and plan.  All questions were answered.  Sun Screen Education was given.   Return to Clinic in 1 months or sooner as needed.   Alycia Madrigal PA-C   [facility]

## 2018-07-25 NOTE — NURSING NOTE
Dermatology Rooming Note    Hal Woo's goals for this visit include:   Chief Complaint   Patient presents with     Derm Problem     Subhash is here today for a rash follow up, and for a suture removal.      Abimbola Neves MA

## 2018-07-25 NOTE — MR AVS SNAPSHOT
After Visit Summary   7/25/2018    Hal Woo    MRN: [id number]           Patient Information     Date Of Birth          1990        Visit Information        Provider Department      7/25/2018 7:45 AM Alycia Madrigal PA-C [facility]        Today's Diagnoses     Plantar wart, left foot    -  1      Care Instructions    Cryotherapy    What is it?    Use of a very cold liquid, such as liquid nitrogen, to freeze and destroy abnormal skin cells that need to be removed    What should I expect?    Tenderness and redness    A small blister that might grow and fill with dark purple blood. There may be crusting.    More than one treatment may be needed if the lesions do not go away.    How do I care for the treated area?    Gently wash the area with your hands when bathing.    Use a thin layer of Vaseline to help with healing. You may use a Band-Aid.     The area should heal within 7-10 days and may leave behind a pink or lighter color.     Do not use an antibiotic or Neosporin ointment.     You may take acetaminophen (Tylenol) for pain.     Call your Doctor if you have:    Severe pain    Signs of infection (warmth, redness, cloudy yellow drainage, and or a bad smell)    Questions or concerns    Who should I call with questions?       [facility]: [phone]       [facility]: [phone]       For urgent needs outside of business hours call the [facility] at [phone]        and ask for the dermatology resident on call            Follow-ups after your visit        Your next 10 appointments already scheduled     Aug 02, 2018  8:30 AM CDT   Adult Med Follow UP with She Rivera MD   Psychiatry Clinic ([facility])    [address] [address] [phone]              Who to contact     Please call your clinic at [phone]  to:    Ask questions about your health    Make or cancel appointments    Discuss your medicines    Learn about your test results    Speak to your doctor            Additional Information About Your Visit        [employer] Information     [employer] gives you secure access to your electronic health record. If you see a primary care provider, you can also send messages to your care team and make appointments. If you have questions, please call your primary care clinic.  If you do not have a primary care provider, please call [phone] and they will assist you.      [employer] is an electronic gateway that provides easy, online access to your medical records. With [employer], you can request a clinic appointment, read your test results, renew a prescription or communicate with your care team.     To access your existing account, please contact your [facility] or call [phone] for assistance.        Care EveryWhere ID     This is your Care EveryWhere ID. This could be used by other organizations to access your [place] medical records  [id number]         Blood Pressure from Last 3 Encounters:   02/06/18 128/75   02/02/18 125/75   09/12/17 122/72    Weight from Last 3 Encounters:   02/06/18 86.6 kg (191 lb)   02/02/18 92.5 kg (204 lb)   09/12/17 92.4 kg (203 lb 13 oz)              We Performed the Following     DESTRUCT BENIGN LESION, UP TO 14        Primary Care Provider Office Phone # Fax #    Leona Leon PA-C [phone]       [address] [place] [address]        Equal Access to Services     VAMSHI DODSON : Hadii aad ku hadasho Soomaali, waaxda luqadaha, qaybta kaalmada adeegyada, waxay cyrus wright . So [facility] [phone].    ATENCIÓN: Si habla español, tiene a anne disposición servicios gratuitos de asistencia lingüística. Llame al [phone].    We comply with applicable federal civil rights laws and [place] laws. We do not  discriminate on the basis of race, color, national origin, age, disability, sex, sexual orientation, or gender identity.            Thank you!     Thank you for choosing [facility]  for your care. Our goal is always to provide you with excellent care. Hearing back from our patients is one way we can continue to improve our services. Please take a few minutes to complete the written survey that you may receive in the mail after your visit with us. Thank you!             Your Updated Medication List - Protect others around you: Learn how to safely use, store and throw away your medicines at www.disposemymeds.org.          This list is accurate as of 7/25/18  8:09 AM.  Always use your most recent med list.                   Brand Name Dispense Instructions for use Diagnosis    cetirizine 10 MG tablet    zyrTEC     Take 10 mg by mouth        desvenlafaxine succinate 100 MG 24 hr tablet    PRISTIQ    30 tablet    Take 1 tablet (100 mg) by mouth daily    WILLIS (generalized anxiety disorder)       fluocinonide 0.05 % ointment    LIDEX    120 g    Apply topically 2 times daily To affected areas of eczema (avoid face, groin and axilla), for approx 2 weeks    Intrinsic atopic dermatitis       fluticasone 50 MCG/ACT spray    FLONASE    1 Bottle    Spray 1 spray into both nostrils daily    Acute seasonal allergic rhinitis due to pollen       LORazepam 0.5 MG tablet    ATIVAN    30 tablet    Take 0.5 tablets (0.25 mg) by mouth 2 times daily    WILLIS (generalized anxiety disorder)       triamcinolone 0.1 % ointment    KENALOG    80 g    Apply topically 2 times daily Avoid face, underarms, groin    Other atopic dermatitis       vitamin B complex with vitamin C Tabs tablet      Take 1 tablet by mouth daily        vitamin D 62221 UNIT capsule    ERGOCALCIFEROL    8 capsule    Take 1 capsule (50,000 Units) by mouth every 7 days    Vitamin D deficiency       VITAMIN D PO      Take by mouth daily

## 2018-07-25 NOTE — PATIENT INSTRUCTIONS
Cryotherapy    What is it?    Use of a very cold liquid, such as liquid nitrogen, to freeze and destroy abnormal skin cells that need to be removed    What should I expect?    Tenderness and redness    A small blister that might grow and fill with dark purple blood. There may be crusting.    More than one treatment may be needed if the lesions do not go away.    How do I care for the treated area?    Gently wash the area with your hands when bathing.    Use a thin layer of Vaseline to help with healing. You may use a Band-Aid.     The area should heal within 7-10 days and may leave behind a pink or lighter color.     Do not use an antibiotic or Neosporin ointment.     You may take acetaminophen (Tylenol) for pain.     Call your Doctor if you have:    Severe pain    Signs of infection (warmth, redness, cloudy yellow drainage, and or a bad smell)    Questions or concerns    Who should I call with questions?       [facility]: [phone]       [facility]: [phone]       For urgent needs outside of business hours call the [facility] at [phone]        and ask for the dermatology resident on call

## 2018-08-02 ENCOUNTER — OFFICE VISIT (OUTPATIENT)
Dept: PSYCHIATRY | Facility: CLINIC | Age: 28
End: 2018-08-02
Attending: PSYCHIATRY & NEUROLOGY
Payer: COMMERCIAL

## 2018-08-02 VITALS
SYSTOLIC BLOOD PRESSURE: 128 MMHG | HEART RATE: 93 BPM | DIASTOLIC BLOOD PRESSURE: 75 MMHG | WEIGHT: 190.4 LBS | BODY MASS INDEX: 31 KG/M2

## 2018-08-02 DIAGNOSIS — F41.1 GAD (GENERALIZED ANXIETY DISORDER): ICD-10-CM

## 2018-08-02 PROCEDURE — G0463 HOSPITAL OUTPT CLINIC VISIT: HCPCS | Mod: ZF

## 2018-08-02 RX ORDER — DESVENLAFAXINE 100 MG/1
100 TABLET, EXTENDED RELEASE ORAL DAILY
Qty: 90 TABLET | Refills: 0 | Status: SHIPPED | OUTPATIENT
Start: 2018-08-30 | End: 2018-11-07

## 2018-08-02 ASSESSMENT — PAIN SCALES - GENERAL: PAINLEVEL: NO PAIN (0)

## 2018-08-02 NOTE — MR AVS SNAPSHOT
After Visit Summary   8/2/2018    Hal Woo    MRN: [id number]           Patient Information     Date Of Birth          1990        Visit Information        Provider Department      8/2/2018 8:30 AM She Rivera MD [facility]iatry [facility]        Today's Diagnoses     WILLIS (generalized anxiety disorder)          Care Instructions    -continue current medications, no changes   -return to clinic in 3 months or sooner if needed          Follow-ups after your visit        Follow-up notes from your care team     Return in about 3 months (around 11/2/2018) for 30 MFU.      Your next 10 appointments already scheduled     Nov 07, 2018  8:30 AM [facility]   Adult Med Follow UP with She Rivera MD   Psychiatry Clinic ([facility])    [place] [id number] [address] [address] [address] [phone]              Who to contact     Please call your clinic at [phone] to:    Ask questions about your health    Make or cancel appointments    Discuss your medicines    Learn about your test results    Speak to your doctor            Additional Information About Your Visit        MyChart Information     Blockboard gives you secure access to your electronic health record. If you see a primary care provider, you can also send messages to your care team and make appointments. If you have questions, please call your primary care clinic.  If you do not have a primary care provider, please call [phone] and they will assist you.      Blockboard is an electronic gateway that provides easy, online access to your medical records. With Blockboard, you can request a clinic appointment, read your test results, renew a prescription or communicate with your care team.     To access your existing account, please contact your [facility] or call [phone] for assistance.        Care EveryWhere ID     This is your Care EveryWhere ID. This could be  used by other organizations to access your [facility] medical records  [id number]        Your Vitals Were     Pulse BMI (Body Mass Index)                93 31 kg/m2           Blood Pressure from Last 3 Encounters:   08/02/18 128/75   06/26/18 128/76   05/22/18 124/73    Weight from Last 3 Encounters:   08/02/18 86.4 kg (190 lb 6.4 oz)   06/26/18 85.5 kg (188 lb 6.4 oz)   05/22/18 95.3 kg (210 lb)              Today, you had the following     No orders found for display         Today's Medication Changes          These changes are accurate as of 8/2/18  8:19 PM.  If you have any questions, ask your nurse or doctor.               These medicines have changed or have updated prescriptions.        Dose/Directions    desvenlafaxine succinate 100 MG 24 hr tablet   Commonly known as:  PRISTIQ   This may have changed:  These instructions start on 8/30/2018. If you are unsure what to do until then, ask your doctor or other care provider.   Used for:  WILLIS (generalized anxiety disorder)   Changed by:  She Rivera MD        Dose:  100 mg   Start taking on:  8/30/2018   Take 1 tablet (100 mg) by mouth daily   Quantity:  90 tablet   Refills:  0            Where to get your medicines      These medications were sent to [facility][id number] - [address] [address]     Phone:  [phone]     desvenlafaxine succinate 100 MG 24 hr tablet                Primary Care Provider Office Phone # Fax #    Leona Leon PA-C [phone]       [address] [place] [address]        Equal Access to Services     [facility] AH: Hadii candido freeman Soivory, waaxda luqadaha, qaybta kaalmarich huff. So [facility] [phone].    ATENCIÓN: Si habla español, tiene a anne disposición servicios gratuitos de asistencia lingüística. Ede pitt [phone].    We comply with applicable federal civil rights  laws and [place] laws. We do not discriminate on the basis of race, color, national origin, age, disability, sex, sexual orientation, or gender identity.            Thank you!     Thank you for choosing PSYCHIATRY CLINIC  for your care. Our goal is always to provide you with excellent care. Hearing back from our patients is one way we can continue to improve our services. Please take a few minutes to complete the written survey that you may receive in the mail after your visit with us. Thank you!             Your Updated Medication List - Protect others around you: Learn how to safely use, store and throw away your medicines at www.disposemymeds.org.          This list is accurate as of 8/2/18  8:19 PM.  Always use your most recent med list.                   Brand Name Dispense Instructions for use Diagnosis    cetirizine 10 MG tablet    zyrTEC     Take 10 mg by mouth        desvenlafaxine succinate 100 MG 24 hr tablet   Start taking on:  8/30/2018    PRISTIQ    90 tablet    Take 1 tablet (100 mg) by mouth daily    WILLIS (generalized anxiety disorder)       fluocinonide 0.05 % ointment    LIDEX    120 g    Apply topically 2 times daily To affected areas of eczema (avoid face, groin and axilla), for approx 2 weeks    Intrinsic atopic dermatitis       fluticasone 50 MCG/ACT spray    FLONASE    1 Bottle    Spray 1 spray into both nostrils daily    Acute seasonal allergic rhinitis due to pollen       LORazepam 0.5 MG tablet    ATIVAN    30 tablet    Take 0.5 tablets (0.25 mg) by mouth 2 times daily    WILLIS (generalized anxiety disorder)       triamcinolone 0.1 % ointment    KENALOG    80 g    Apply topically 2 times daily Avoid face, underarms, groin    Other atopic dermatitis       vitamin B complex with vitamin C Tabs tablet      Take 1 tablet by mouth daily        vitamin D 92483 UNIT capsule    ERGOCALCIFEROL    8 capsule    Take 1 capsule (50,000 Units) by mouth every 7 days    Vitamin D deficiency       VITAMIN D  PO      Take by mouth daily

## 2018-08-02 NOTE — PROGRESS NOTES
"  PSYCHIATRY CLINIC PROGRESS NOTE   The initial diagnostic evaluation was on 2/13/17 and the last transfer of care evaluation was 7/11/17.    Pertinent Background:  This patient first experienced mental health issues in childhood where he saw a therapist in grade school and has received treatment for depression and anxiety.  See transfer evaluation for detailed history.  Notably, depression present in childhood along with a low level of anxiety, first noticed an increase in anxiety around May 2011 after graduation from college and moving in with parents for 2 months in the fall of that year. Oct 2011 he had first \"panic attack\" while at work in context of overuse of caffeine to help aid a hangover from a previous night of drinking EtOH. Since 2011 has had a high amount of \"fear and worry\" which is prominent when leaving his home and exacerbated by feeling like he is trapped and does not have an escape route. Hx of passive SI. Has experienced side effects (\"brain on fire\") to every medication tried aside from mirtazapine which was stopped 2/2 weight gain.      Psych critical item history includes suicidal ideation.    INTERIM HISTORY                                                 Hal Woo is a 28 year old male who was last seen in clinic on 6/26/18 at which time scheduled lorazepam was discontinued and changed to PRN.  The patient reports good treatment adherence. History was provided by the patient who was a good historian.  Since the last visit:  -doing well since last visit   -anxiety has been relatively well managed, no panic attacks, did have some situational anxiety with going to meetings across campus but has been doing more biking and walking which is going well and is able to be out of his apartment and does not feel limited in activities due to his anxiety   -has not used any PRN lorazepam since last visit, does always keep it on him \"just in case\"  -continues to have some sexual side effects from " desvenlafaxine but these are tolerable and not impacting his current overall quality of life, will continue to check in regarding this and agreed to let clinic know should this change  -mood has been euthymic despite the amount of stress he has been under with his accelerated course at school and increase in work stressors  -has continued to be very physically active at the gym and working on his nutrition which helps with stress, has not been doing his yoga practice and now that some of his stressors have decreased is planning on getting back to his yoga for coping   -continues to work with his therapist and this is going well and is helpful  -has made the decision to move to [place] once he is done with school in a year to look at employment opportunities, will keep clinic in the loop to help transition his care once he has a time frame for his move  -denies having any safety concerns including thoughts of harming self or others    RECENT SYMPTOMS:   DEPRESSION:  reports-anhedonia, low energy, insomnia, appetite changes, poor concentration /memory and psychomotor changes [fidgeting];  DENIES- suicidal ideation, depressed mood and excessive guilt  DEVAUGHN/HYPOMANIA:  reports-none;  DENIES- increased energy, decreased sleep need, increased activity and grandiosity  PSYCHOSIS:  reports-none;  DENIES- delusions, auditory hallucinations and visual hallucinations  DYSREGULATION:  reports-none;  DENIES- suicidal ideation, violent ideation and SIB  PANIC ATTACK:  none   ANXIETY:  excessive worry, social anxiety and nervous/overwhelmed  TRAUMA RELATED:  none  COMPULSIVE:  none  SLEEP:  as above    EATING DISORDER: none    RECENT SUBSTANCE USE:     ALCOHOL- occasional  TOBACCO- none            CAFFEINE- limited intake 2/2 anxiety, but has been drinking more half caf coffee recently              CANNABIS- none  OPIOIDS- none            NARCAN KIT- N/A       OTHER ILLICIT DRUGS- none     CURRENT SOCIAL HISTORY:  FINANCIAL  SUPPORT- working       CHILDREN- none       LIVING SITUATION- lives alone in apartment      SOCIAL/ SPIRITUAL SUPPORT- friends, family, therapist      FEELS SAFE AT HOME- Yes     MEDICAL ROS:  Reports sexual side effects (decreased drive and difficulty maintaining erection) Denies diarrhea, constipation, nausea, headaches, weight changes [increase, decrease], sedation, tremor, confusion, excessive diaphoresis, muscle twitches, bruxism, shiver and easy bruising, slowed reaction time and withdrawal symptoms.    PSYCH and CD Critical Summary Points since July 2017 July-August: Tried 2 trials of increasing sertraline to 37.5mg with increased anxiety and anger within 24 hours; obtained [employer] testing  September: Attempted to start vilazodone-no covered by insurance and appeal denied; started desvenalfaxine 25mg   October: Increased desvenlafaxine to 50mg daily to further target anxiety and depression  November: Increased desvenlafaxine to 75mg daily  December: switched back to [employer]  of Pristiq as he found other formulation less effective  February: Started slow taper of lorazepam  May: Increased desvenlafaxine to 100mg to further target anxiety and some depression symptoms  Michelle: discontinued all scheduled lorazepam, changed to PRN    PAST PSYCH MED TRIALS   see EMR Problem List: Hx of psychiatric care    MEDICAL / SURGICAL HISTORY                                   CARE TEAM:    PCP- Dr. Brittany Penaloza   Therapist- Sofía Ramirez MSW MPH LICSW    Patient Active Problem List   Diagnosis     Disturbance in sleep behavior     Generalized anxiety disorder     Insomnia     Panic disorder with agoraphobia     Vitamin D deficiency     Hx of psychiatric care     Moderate episode of recurrent major depressive disorder (H)     Vegan diet     Intrinsic eczema       ALLERGY                                Pineapple and Seasonal allergies  MEDICATIONS                               Current Outpatient  Prescriptions   Medication Sig Dispense Refill     cetirizine (ZYRTEC) 10 MG tablet Take 10 mg by mouth       Cholecalciferol (VITAMIN D PO) Take by mouth daily       desvenlafaxine succinate (PRISTIQ) 100 MG 24 hr tablet Take 1 tablet (100 mg) by mouth daily 30 tablet 2     fluocinonide (LIDEX) 0.05 % ointment Apply topically 2 times daily To affected areas of eczema (avoid face, groin and axilla), for approx 2 weeks 120 g 1     LORazepam (ATIVAN) 0.5 MG tablet Take 0.5 tablets (0.25 mg) by mouth 2 times daily 30 tablet 0     triamcinolone (KENALOG) 0.1 % ointment Apply topically 2 times daily Avoid face, underarms, groin 80 g 3     vitamin B complex with vitamin C (VITAMIN  B COMPLEX) TABS tablet Take 1 tablet by mouth daily       vitamin D (ERGOCALCIFEROL) 95926 UNIT capsule Take 1 capsule (50,000 Units) by mouth every 7 days 8 capsule 0     fluticasone (FLONASE) 50 MCG/ACT spray Spray 1 spray into both nostrils daily (Patient not taking: Reported on 6/26/2018) 1 Bottle 3     VITALS   /75  Pulse 93  Wt 86.4 kg (190 lb 6.4 oz)  BMI 31 kg/m2   MENTAL STATUS EXAM                                                           Alertness: alert  and oriented  Appearance: well groomed and appropriately dressed for weather  Behavior/Demeanor: cooperative and pleasant, with good  eye contact   Speech: normal  Language: intact  Psychomotor:  normal or unremarkable  Mood: anxious but continues to improve and be more mild in presentation  Affect: full range; was congruent to mood; was congruent to content  Thought Process/Associations: unremarkable  Thought Content:  Reports none;  Denies suicidal ideation, violent ideation and delusions  Perception:  Reports none;  Denies auditory hallucinations and visual hallucinations  Insight: good   Judgment: good  Cognition: does  appear grossly intact; formal cognitive testing was not done    LABS and DATA     RATING SCALES:  none    PHQ9 TODAY = 7  PHQ-9 SCORE 4/25/2018  5/22/2018 6/26/2018   Total Score 9 8 9     DIAGNOSIS     WILLIS with agoraphobia (improving)  Major Depression Disorder, single episode, mild    ASSESSMENT                                     TODAY Subhash reports continued stability in mood symptoms, has been tolerating tapering off of scheduled lorazepam. Continues to have some anxiety at times but has been able to manage with other coping mechanisms and also able to process stressors within therapy sessions. Does understand he has lorazepam available PRN should anxiety increase and he have episodes of panic but he has not had to use this since last appointment. Will consider discontinuing this PRN if stability in anxiety continues over the next few months. No suicidal thoughts or safety concerns. Advocates for no changes today which is reasonable. He will follow up in 2-3 months per his request given current stability and understands he can be seen sooner if needed.     CONTROLLED SUBSTANCE STATEMENT:  The use of Lorazepam (Ativan) is indicated and appropriate.  This regimen is both beneficial and well tolerated with no adverse effects or tolerance.  There is no evidence of abuse of this medication or other substances.  Warnings related to abuse potential, street value, adverse effects, abrupt cessation, withdrawal and need for emergent care have been discussed and are understood by the patient.  The patient has verbalized clear understanding of safety issues as well as the need to control use.  Plan to continue use at this time.   Controlled Substance Contract was not completed.    [place] PRESCRIPTION MONITORING PROGRAM [] was not checked today:  indicates pt only receiving prescription from this clinic, no early refills or evidence of misuse/abuse    PSYCHOTROPIC DRUG INTERACTIONS:   None.  MANAGEMENT:  N/A     PLAN                                                                                                       1) PSYCHOTROPIC MEDICATIONS:  - continue  desvenlafaxine 100mg daily (prescribe Verified Person )   - continue lorazepam 0.25mg BID PRN (has not taken since last appointment)    2) THERAPY:  Continue  TREATMENT PLAN   Date revised  -  6/26/18  Date next due- 9/26/18    3) NEXT DUE:    Labs- N/A  Rating Scales- N/A    4) REFERRALS:    NONE    5) RTC: 2-3 months    6) CRISIS NUMBERS:   Provided routinely in AVS.  Especially emphasized:  ONLY if a JOSTIN PT: [facility] [phone] (clinic), [phone] (after hours)      TREATMENT RISK STATEMENT:  The risks, benefits, alternatives and potential adverse effects have been discussed and are understood by the patient/ patient's guardian. The pt understands the risks of using street drugs or alcohol.  There are no medical contraindications, the pt agrees to treatment with the ability to do so.  The patient understands to call 911 or come to the nearest ED if life threatening or urgent symptoms present.    PSYCHIATRY CLINIC INDIVIDUAL PSYCHOTHERAPY NOTE                                    16   Start time: 0835              End time: 0855  Subjective: This supportive psychotherapy session addressed issues related to goals of therapy and current stressors including school and work and recent decision to move to [place] next year.  Patient's reaction: Preparatory in the context of mental status appropriate for ambulatory setting.  Progress: fair  Plan: RTC 2-3 months  Psychotherapy services during this visit included myself and Subhash.   TREATMENT  PLAN          SYMPTOMS;PROBLEMS   MEASURABLE GOALS;    FUNCTIONAL IMPROVEMENT INTERVENTIONS;    GAINS MADE DISCHARGE CRITERIA   Anxiety: nervous/tense/restless/overwhelmed   Utilize mindfulness  -attend mindfulness/meditation classes 1x per month to continue practice and be intentional about incorporating mindfulness daily (has not been doing this recently) marked symptom improvement   Depression: depressed mood   improve/ maintain good physical health  practices as a coping skill by exercising at least every other day and restart yoga practice in the New Year -currently getting exercise about 5x per week, wants to maintain at 5x; as well as go to yoga 2x per month  (has not been to yoga since last appointment) marked symptom improvement     RESIDENT:   She Rivera, DO    Patient not staffed in clinic, note will be reviewed and signed by supervisor Dr. Brown.  I did not see this pt directly. I have reviewed the documentation, and I agree with the resident's plan of care.    Rody Brown

## 2018-08-03 ASSESSMENT — PATIENT HEALTH QUESTIONNAIRE - PHQ9: SUM OF ALL RESPONSES TO PHQ QUESTIONS 1-9: 7

## 2018-10-09 ENCOUNTER — THERAPY VISIT (OUTPATIENT)
Dept: PHYSICAL THERAPY | Facility: CLINIC | Age: 28
End: 2018-10-09
Payer: COMMERCIAL

## 2018-10-09 DIAGNOSIS — M79.662 PAIN OF LEFT LOWER LEG: Primary | ICD-10-CM

## 2018-10-09 PROCEDURE — 97110 THERAPEUTIC EXERCISES: CPT | Mod: GP | Performed by: PHYSICAL THERAPIST

## 2018-10-09 PROCEDURE — 97161 PT EVAL LOW COMPLEX 20 MIN: CPT | Mod: GP | Performed by: PHYSICAL THERAPIST

## 2018-10-09 NOTE — PROGRESS NOTES
[facility] Initial Evaluation  Subjective:  [facility]          Physical Therapy Initial Examination/Evaluation  October 9, 2018    Hal Woo is a 28 year old male referred to physical therapy for treatment of left calf with Precautions/Restrictions/MD instructions pain be your guide      Subjective:  DOI/onset: 9/19/18   Acute Injury or Gradual Onset?: Acute injury onset  Mechanism of Injury: Sport; raquetball  Related PMH: hx of sprain from sprints on a treadmill Previous Treatment: Rest boot for 1 day Effect of prior treatment: good  Imaging: MRI  Chief Complaint/Functional Limitations:   Pain with dismounting bike and see below in therapy evaluation codes   Pain: rest 0 /10, activity 5/10 Location: left calf Frequency: Intermittent Described as: aching Alleviated by: Rest Progression of Symptoms: Gradually getting better. Time of day when pain is worse: Activity related  Sleeping: No issues/uninterrupted   Occupation:   Job duties: prolonged sitting, keyboarding/computer use  Current HEP/exercise regimen: weight training, running  Patient's goals are see chief complaints return to sport    Other pertinent PMH/Red Flags: Overweight, Sleep Disorder/Apnea   Barriers at home/work: None as reported by patient  Pertinent Surgical History: none  Medications: None as reported by patient  General health as reported by patient: fair  Return to MD:  Not at this time  raquetball 1-2 times a month, running, cardio    Objective:  System  Obs: pleasant male; mild ecchymosis right distal medial gatroc  Obs: decr left push off with gait  SLS: fair on unstable surface, excellent on stable  2L squat good form no sxs  2L heel raise no sxs good clearance but bias right  SL heel raise decr clearance left and pain  TTP: medial left gastroc, no tendon divot-thompsons negative    Physical Exam    General     ROS    Assessment/Plan:    Patient is a 28 year old male with left side ankle complaints.     Patient has the following significant findings with corresponding treatment plan.                Diagnosis 1:  Left gastroc tear  Pain -  hot/cold therapy, manual therapy, self management, education and home program  Decreased strength - therapeutic exercise and therapeutic activities  Impaired balance - neuro re-education and therapeutic activities  Decreased function - therapeutic activities    Therapy Evaluation Codes:   1) History comprised of:   Personal factors that impact the plan of care:      None.    Comorbidity factors that impact the plan of care are:      None.     Medications impacting care: None.  2) Examination of Body Systems comprised of:   Body structures and functions that impact the plan of care:      Ankle.   Activity limitations that impact the plan of care are:      Jumping and Sports.  3) Clinical presentation characteristics are:   Stable/Uncomplicated.  4) Decision-Making    Low complexity using standardized patient assessment instrument and/or measureable assessment of functional outcome.  Cumulative Therapy Evaluation is: Low complexity.    Previous and current functional limitations:  (See Goal Flow Sheet for this information)    Short term and Long term goals: (See Goal Flow Sheet for this information)     Communication ability:  Patient appears to be able to clearly communicate and understand verbal and written communication and follow directions correctly.  Treatment Explanation - The following has been discussed with the patient:   RX ordered/plan of care  Anticipated outcomes  Possible risks and side effects  This patient would benefit from PT intervention to resume normal activities.   Rehab potential is good.    Frequency:  1 X week, once daily  Duration:  for 6 weeks  Discharge Plan:  Achieve all LTG.  Independent in home treatment program.  Reach maximal therapeutic benefit.    Please refer to the daily flowsheet for treatment today, total treatment time and time spent  performing 1:1 timed codes.

## 2018-10-09 NOTE — MR AVS SNAPSHOT
After Visit Summary   10/9/2018    Hal Woo    MRN: [id number]           Patient Information     Date Of Birth          1990        Visit Information        Provider Department      10/9/2018 3:50 PM Jose Mclean PT [facility]        Today's Diagnoses     Pain of left lower leg    -  1       Follow-ups after your visit        Your next 10 appointments already scheduled     Oct 23, 2018  7:00 AM CDT   NOÉ Extremity with [facility]AN[facility] ([facility])    [address] [id number] [phone]            Nov 07, 2018  8:30 AM CST   Adult Med Follow UP with She Rivera MD   Psychiatry Clinic ([facility])    [place] [id number]  [address] [id number] [phone]              Who to contact     If you have questions or need follow up information about today's clinic visit or your schedule please contact [facility] PHYSICAL THERAPY NO[facility] directly at [phone].  Normal or non-critical lab and imaging results will be communicated to you by [employer]hart, letter or phone within 4 business days after the clinic has received the results. If you do not hear from us within 7 days, please contact the clinic through [employer]hart or phone. If you have a critical or abnormal lab result, we will notify you by phone as soon as possible.  Submit refill requests through Selvz or call your pharmacy and they will forward the refill request to us. Please allow 3 business days for your refill to be completed.          Additional Information About Your Visit        [employer] Information     [employer] gives you secure access to your electronic health record. If you see a primary care provider, you can also send messages to your care team and make appointments. If you have questions, please call your primary care clinic.  If you do not have a primary  care provider, please call [phone] and they will assist you.        Care EveryWhere ID     This is your Care EveryWhere ID. This could be used by other organizations to access your [place] medical records  [id number]         Blood Pressure from Last 3 Encounters:   02/06/18 128/75   02/02/18 125/75   09/12/17 122/72    Weight from Last 3 Encounters:   02/06/18 86.6 kg (191 lb)   02/02/18 92.5 kg (204 lb)   09/12/17 92.4 kg (203 lb 13 oz)              We Performed the Following     NOÉ Inital Eval Report     PT Eval, Low Complexity (58632)     Therapeutic Exercises        Primary Care Provider Office Phone # Fax #    Leona Leon PA-C [phone]       [address] [place] [address]        Equal Access to Services     [facility]: Hadii aad ku hadasho Soivory, waaxda luqadaha, qaybta kaalmada adeegyada, rich bridges hayrobert wright . So [facility] [phone].    ATENCIÓN: Si habla español, tiene a anne disposición servicios gratuitos de asistencia lingüística. Llame al [phone].    We comply with applicable federal civil rights laws and [place] laws. We do not discriminate on the basis of race, color, national origin, age, disability, sex, sexual orientation, or gender identity.            Thank you!     Thank you for choosing [facility] PHYSICAL THERAPY [facility]  for your care. Our goal is always to provide you with excellent care. Hearing back from our patients is one way we can continue to improve our services. Please take a few minutes to complete the written survey that you may receive in the mail after your visit with us. Thank you!             Your Updated Medication List - Protect others around you: Learn how to safely use, store and throw away your medicines at www.disposemymeds.org.          This list is accurate as of 10/9/18  4:33 PM.  Always use your most recent med list.                   Brand Name Dispense Instructions for use Diagnosis    cetirizine 10  MG tablet    zyrTEC     Take 10 mg by mouth        desvenlafaxine succinate 100 MG 24 hr tablet    PRISTIQ    90 tablet    Take 1 tablet (100 mg) by mouth daily    WILLIS (generalized anxiety disorder)       fluocinonide 0.05 % ointment    LIDEX    120 g    Apply topically 2 times daily To affected areas of eczema (avoid face, groin and axilla), for approx 2 weeks    Intrinsic atopic dermatitis       fluticasone 50 MCG/ACT spray    FLONASE    1 Bottle    Spray 1 spray into both nostrils daily    Acute seasonal allergic rhinitis due to pollen       LORazepam 0.5 MG tablet    ATIVAN    30 tablet    Take 0.5 tablets (0.25 mg) by mouth 2 times daily    WILLIS (generalized anxiety disorder)       triamcinolone 0.1 % ointment    KENALOG    80 g    Apply topically 2 times daily Avoid face, underarms, groin    Other atopic dermatitis       vitamin B complex with vitamin C Tabs tablet      Take 1 tablet by mouth daily        vitamin D 93530 UNIT capsule    ERGOCALCIFEROL    8 capsule    Take 1 capsule (50,000 Units) by mouth every 7 days    Vitamin D deficiency       VITAMIN D PO      Take by mouth daily

## 2018-10-09 NOTE — LETTER
[facility]  [address] [address]  [phone]    October 10, 2018  Re: Hal Woo   :   1990  MRN:  [id number]   REFERRING PHYSICIAN:   [facility]  Date of Initial Evaluation:  10/9/18  Visits:  Rxs Used: 1  Reason for Referral:  Pain of left lower leg    Physical Therapy Initial Examination/Evaluation  2018    Hal Woo is a 28 year old male referred to physical therapy for treatment of left calf with Precautions/Restrictions/MD instructions pain be your guide    Subjective:  DOI/onset: 18   Acute Injury or Gradual Onset?: Acute injury onset  Mechanism of Injury: Sport; raquetball  Related PMH: hx of sprain from sprints on a treadmill Previous Treatment: Rest boot for 1 day Effect of prior treatment: good  Imaging: MRI  Chief Complaint/Functional Limitations:   Pain with dismounting bike and see below in therapy evaluation codes   Pain: rest 0 /10, activity 5/10 Location: left calf Frequency: Intermittent Described as: aching Alleviated by: Rest Progression of Symptoms: Gradually getting better. Time of day when pain is worse: Activity related  Sleeping: No issues/uninterrupted   Occupation:   Job duties: prolonged sitting, keyboarding/computer use  Current HEP/exercise regimen: weight training, running  Patient's goals are see chief complaints return to sport  Other pertinent PMH/Red Flags: Overweight, Sleep Disorder/Apnea   Barriers at home/work: None as reported by patient  Pertinent Surgical History: none  Medications: None as reported by patient  General health as reported by patient: fair  Return to MD:  Not at this time  radhavaltball 1-2 times a month, running, cardio    Objective:  Obs: pleasant male; mild ecchymosis right distal medial gatroc  Obs: decr left push off with gait  SLS: fair on unstable surface, excellent on stable  2L squat good form no sxs  2L  heel raise no sxs good clearance but bias right  SL heel raise decr clearance left and pain  TTP: medial left gastroc, no tendon divot-thompsons negative    Re: Halawais Woo   :   1990    Assessment/Plan:    Patient is a 28 year old male with left side ankle complaints.    Patient has the following significant findings with corresponding treatment plan.                Diagnosis 1:  Left gastroc tear  Pain -  hot/cold therapy, manual therapy, self management, education and home program  Decreased strength - therapeutic exercise and therapeutic activities  Impaired balance - neuro re-education and therapeutic activities  Decreased function - therapeutic activities  Therapy Evaluation Codes:   1) History comprised of:   Personal factors that impact the plan of care:      None.    Comorbidity factors that impact the plan of care are:      None.     Medications impacting care: None.  2) Examination of Body Systems comprised of:   Body structures and functions that impact the plan of care:      Ankle.   Activity limitations that impact the plan of care are:      Jumping and Sports.  3) Clinical presentation characteristics are:   Stable/Uncomplicated.  4) Decision-Making    Low complexity using standardized patient assessment instrument and/or measureable assessment of functional outcome.  Cumulative Therapy Evaluation is: Low complexity.  Previous and current functional limitations:  (See Goal Flow Sheet for this information)    Short term and Long term goals: (See Goal Flow Sheet for this information)   Communication ability:  Patient appears to be able to clearly communicate and understand verbal and written communication and follow directions correctly.  Treatment Explanation - The following has been discussed with the patient:   RX ordered/plan of care  Anticipated outcomes  Possible risks and side effects  This patient would benefit from PT intervention to resume normal activities.   Rehab potential is  good.  Frequency:  1 X week, once daily  Duration:  for 6 weeks  Discharge Plan:  Achieve all LTG.  Independent in home treatment program.  Reach maximal therapeutic benefit.  Please refer to the daily flowsheet for treatment today, total treatment time and time spent performing 1:1 timed codes.     Thank you for your referral.    INQUIRIES  Therapist: Jose Mclean PT  [facility] PHYSICAL THERAPY NOÉ[address] [address]  Phone: [phone]

## 2018-10-23 ENCOUNTER — THERAPY VISIT (OUTPATIENT)
Dept: PHYSICAL THERAPY | Facility: CLINIC | Age: 28
End: 2018-10-23
Payer: COMMERCIAL

## 2018-10-23 DIAGNOSIS — M79.662 PAIN OF LEFT LOWER LEG: ICD-10-CM

## 2018-10-23 PROCEDURE — 97140 MANUAL THERAPY 1/> REGIONS: CPT | Mod: GP | Performed by: PHYSICAL THERAPY ASSISTANT

## 2018-10-23 PROCEDURE — 97110 THERAPEUTIC EXERCISES: CPT | Mod: GP | Performed by: PHYSICAL THERAPY ASSISTANT

## 2018-10-30 ENCOUNTER — THERAPY VISIT (OUTPATIENT)
Dept: PHYSICAL THERAPY | Facility: CLINIC | Age: 28
End: 2018-10-30
Payer: COMMERCIAL

## 2018-10-30 DIAGNOSIS — M79.662 PAIN OF LEFT LOWER LEG: ICD-10-CM

## 2018-10-30 PROCEDURE — 97110 THERAPEUTIC EXERCISES: CPT | Mod: GP | Performed by: PHYSICAL THERAPY ASSISTANT

## 2018-10-30 NOTE — MR AVS SNAPSHOT
After Visit Summary   10/30/2018    Hal Woo    MRN: [id number]           Patient Information     Date Of Birth          1990        Visit Information        Provider Department      10/30/2018 8:20 AM Mitra Downing PTA [facility]        Today's Diagnoses     Pain of left lower leg           Follow-ups after your visit        Your next 10 appointments already scheduled     Nov 07, 2018  8:30 AM CST   Adult Med Follow UP with She Rivera MD   Psychiatry Clinic ([facility])    [address]  [address] [id number] [phone]              Who to contact     If you have questions or need follow up information about today's clinic visit or your schedule please contact [facility] PHYSICAL THERAPY NOÉ directly at [phone].  Normal or non-critical lab and imaging results will be communicated to you by [employer]dsystem[employer]art, letter or phone within 4 business days after the clinic has received the results. If you do not hear from us within 7 days, please contact the clinic through p3dsystemshart or phone. If you have a critical or abnormal lab result, we will notify you by phone as soon as possible.  Submit refill requests through [employer] or call your pharmacy and they will forward the refill request to us. Please allow 3 business days for your refill to be completed.          Additional Information About Your Visit        MyChart Information     [employer] gives you secure access to your electronic health record. If you see a primary care provider, you can also send messages to your care team and make appointments. If you have questions, please call your primary care clinic.  If you do not have a primary care provider, please call [phone] and they will assist you.        Care EveryWhere ID     This is your Care EveryWhere ID. This could be used by other organizations to access your [place] medical records  [id number]          Blood Pressure from Last 3 Encounters:   02/06/18 128/75   02/02/18 125/75   09/12/17 122/72    Weight from Last 3 Encounters:   02/06/18 86.6 kg (191 lb)   02/02/18 92.5 kg (204 lb)   09/12/17 92.4 kg (203 lb 13 oz)              We Performed the Following     Therapeutic Exercises        Primary Care Provider Office Phone # Fax #    Leona Leon PA-C [phone] [address] [place] [address]        Equal Access to Services     [facility]: Hadii aad ku hadasho Soomaali, waaxda luqadaha, qaybta kaalmada adeamberyanenita, rich wright . So [facility] [phone].    ATENCIÓN: Si habla español, tiene a anne disposición servicios gratuitos de asistencia lingüística. [facility] [phone].    We comply with applicable federal civil rights laws and [place] laws. We do not discriminate on the basis of race, color, national origin, age, disability, sex, sexual orientation, or gender identity.            Thank you!     Thank you for choosing [facility] PHYSICAL THERAPY NOÉ  for your care. Our goal is always to provide you with excellent care. Hearing back from our patients is one way we can continue to improve our services. Please take a few minutes to complete the written survey that you may receive in the mail after your visit with us. Thank you!             Your Updated Medication List - Protect others around you: Learn how to safely use, store and throw away your medicines at www.disposemymeds.org.          This list is accurate as of 10/30/18 10:35 AM.  Always use your most recent med list.                   Brand Name Dispense Instructions for use Diagnosis    cetirizine 10 MG tablet    zyrTEC     Take 10 mg by mouth        desvenlafaxine succinate 100 MG 24 hr tablet    PRISTIQ    90 tablet    Take 1 tablet (100 mg) by mouth daily    WILLIS (generalized anxiety disorder)       fluocinonide 0.05 % ointment    LIDEX    120 g    Apply topically 2 times daily To  affected areas of eczema (avoid face, groin and axilla), for approx 2 weeks    Intrinsic atopic dermatitis       fluticasone 50 MCG/ACT spray    FLONASE    1 Bottle    Spray 1 spray into both nostrils daily    Acute seasonal allergic rhinitis due to pollen       LORazepam 0.5 MG tablet    ATIVAN    30 tablet    Take 0.5 tablets (0.25 mg) by mouth 2 times daily    WILLIS (generalized anxiety disorder)       triamcinolone 0.1 % ointment    KENALOG    80 g    Apply topically 2 times daily Avoid face, underarms, groin    Other atopic dermatitis       vitamin B complex with vitamin C Tabs tablet      Take 1 tablet by mouth daily        vitamin D 61058 UNIT capsule    ERGOCALCIFEROL    8 capsule    Take 1 capsule (50,000 Units) by mouth every 7 days    Vitamin D deficiency       VITAMIN D PO      Take by mouth daily

## 2018-11-07 ENCOUNTER — OFFICE VISIT (OUTPATIENT)
Dept: PSYCHIATRY | Facility: CLINIC | Age: 28
End: 2018-11-07
Attending: PSYCHIATRY & NEUROLOGY
Payer: COMMERCIAL

## 2018-11-07 VITALS
SYSTOLIC BLOOD PRESSURE: 114 MMHG | DIASTOLIC BLOOD PRESSURE: 75 MMHG | HEART RATE: 73 BPM | BODY MASS INDEX: 30.26 KG/M2 | WEIGHT: 185.8 LBS

## 2018-11-07 DIAGNOSIS — F41.1 GAD (GENERALIZED ANXIETY DISORDER): Primary | ICD-10-CM

## 2018-11-07 PROCEDURE — G0463 HOSPITAL OUTPT CLINIC VISIT: HCPCS | Mod: ZF

## 2018-11-07 RX ORDER — DESVENLAFAXINE 100 MG/1
100 TABLET, EXTENDED RELEASE ORAL DAILY
Qty: 90 TABLET | Refills: 0 | Status: SHIPPED | OUTPATIENT
Start: 2018-11-07 | End: 2019-01-16

## 2018-11-07 ASSESSMENT — PATIENT HEALTH QUESTIONNAIRE - PHQ9: SUM OF ALL RESPONSES TO PHQ QUESTIONS 1-9: 15

## 2018-11-07 ASSESSMENT — PAIN SCALES - GENERAL: PAINLEVEL: NO PAIN (0)

## 2018-11-07 NOTE — PATIENT INSTRUCTIONS
-continue current medications without changes  -return to clinic in 2-3 months or sooner if needed                   Thank you for coming to the PSYCHIATRY CLINIC.    Lab Testing:  If you had lab testing today and your results are reassuring or normal they will be mailed to you or sent through viVood within 7 days.   If the lab tests need quick action we will call you with the results.  The phone number we will call with results is # [phone] (home) . If this is not the best number please call our clinic and change the number.    Medication Refills:  If you need any refills please call your pharmacy and they will contact us. Our fax number for refills is [phone]. Please allow three business for refill processing.   If you need to  your refill at a new pharmacy, please contact the new pharmacy directly. The new pharmacy will help you get your medications transferred.     Scheduling:  If you have any concerns about today's visit or wish to schedule another appointment please call our office during normal business hours [phone] (8-5:00 M-F)    Contact Us:  Please call [phone] during business hours (8-5:00 M-F).  If after clinic hours, or on the weekend, please call  [phone].    Financial Assistance [phone]  [employer] Billing [phone]  [facility] Billing [phone]  Medical Records [phone]      MENTAL HEALTH CRISIS NUMBERS:  [facility]:   [facility] - [phone]   Crisis Residence [facility] - [phone]   Walk-In Counseling [facility] [phone]   COPE 24/7 [place] Mobile Team for Adults - [[phone]]; Child - [[phone]]     Crisis Connection - [phone]     [facility]:   [facility] - [phone]   Walk-in counseling [facility] - [phone]   Walk-in counseling [facility] - [phone]   Crisis Residence [facility] - [phone]   Urgent  Care Adult Mental Health:   --Drop-in, 24/7 crisis line, and [facility] Mobile Team [[phone]]    CRISIS TEXT LINE: Text [phone] from anywhere, anytime, any crisis 24/7;    OR SEE www.crisistextline.org     Poison Control Center - [phone]    CHILD: [facility] needs assessment team - [phone]     [facility] Lifeline - [phone]; or [facility] Lifeline - [phone]    If you have a medical emergency please call 911or go to the nearest ER.                    _____________________________________________    Again thank you for choosing PSYCHIATRY CLINIC and please let us know how we can best partner with you to improve you and your family's health.  You may be receiving a survey in the mail regarding this appointment. We would love to have your feedback, both positive and negative, so please fill out the survey and return it using the provided envelope. The survey is done by an external company, so your answers are anonymous.

## 2018-11-07 NOTE — PROGRESS NOTES
"  PSYCHIATRY CLINIC PROGRESS NOTE   The initial diagnostic evaluation was on 2/13/17 and the last transfer of care evaluation was 7/11/17.    Pertinent Background:  This patient first experienced mental health issues in childhood where he saw a therapist in grade school and has received treatment for depression and anxiety.  See transfer evaluation for detailed history.  Notably, depression present in childhood along with a low level of anxiety, first noticed an increase in anxiety around May 2011 after graduation from college and moving in with parents for 2 months in the fall of that year. Oct 2011 he had first \"panic attack\" while at work in context of overuse of caffeine to help aid a hangover from a previous night of drinking EtOH. Since 2011 has had a high amount of \"fear and worry\" which is prominent when leaving his home and exacerbated by feeling like he is trapped and does not have an escape route. Hx of passive SI. Has experienced side effects (\"brain on fire\") to every medication tried aside from mirtazapine which was stopped 2/2 weight gain.      Psych critical item history includes suicidal ideation.    INTERIM HISTORY                                                 Hal Woo is a 28 year old male who was last seen in clinic on 8/2/18 at which time no changes were made.  The patient reports good treatment adherence. History was provided by the patient who was a good historian.  Since the last visit:  -has had a difficult past 3 months due to an increase in stressors: friend's father passed away, relationship stressors, he tore his L calf, and school and work have been busy  -has recognized the impact this has had on his mood, has been more irritable  -has been taking his desvenlafaxine daily, no new side effects   -he has not required any PRN lorazepam despite increase in stressors, has found other coping mechanisms, still wants to have some on hand \"for emergencies\" as it is helpful to know he " "can take some if needed  -did have some passive suicidal ideation in past few months, but no plan or intent; is feeling safe today, denies having thoughts to harm self or others  -has been attending therapy 2x per month and finds this helpful  -is hoping to increase physical activity now that his calf has healed and is hopeful this will help with mood   -has not been going to yoga, thought about going this morning but due to this appointment did not   -sleep has been impacted by daylight savings and he is feeling his energy is down this week   -was able to fly alone to [place] without any difficulty, identified this as \"huge\" given where his anxiety has been in the past and the impact it has had on his ability to travel  -has had some increase in urges to drink alcohol, but has not increased use, will have a rare drink, maybe 1x per week and never more than 1 beer   -he is future oriented and continues to plan on moving to [facility] following completion of school in 5 weeks, will coordinate with clinic if needing assistance in transferring care if he moves    RECENT SYMPTOMS:   DEPRESSION:  reports-depressed mood, anhedonia, low energy, insomnia, weight changes, poor concentration /memory, excessive guilt and psychomotor changes [fidgeting];  DENIES- suicidal ideation  DEVAUGHN/HYPOMANIA:  reports-none;  DENIES- increased energy, decreased sleep need, increased activity and grandiosity  PSYCHOSIS:  reports-none;  DENIES- delusions, auditory hallucinations and visual hallucinations  DYSREGULATION:  reports-none;  DENIES- suicidal ideation, violent ideation and SIB  PANIC ATTACK:  none   ANXIETY:  excessive worry, social anxiety and nervous/overwhelmed  TRAUMA RELATED:  none  COMPULSIVE:  none  ATTENTION:  h/o ADHD/ other learning disorder  EATING DISORDER: none    RECENT SUBSTANCE USE:     ALCOHOL- occasional (1 beer per week)  TOBACCO- none            CAFFEINE- limited intake 2/2 anxiety, but will at times drink half caf coffee  "         CANNABIS- none  OPIOIDS- none            NARCAN KIT- N/A       OTHER ILLICIT DRUGS- none     CURRENT SOCIAL HISTORY:  FINANCIAL SUPPORT- working       CHILDREN- none       LIVING SITUATION- lives alone in apartment      SOCIAL/ SPIRITUAL SUPPORT- friends, family, therapist      FEELS SAFE AT HOME- Yes     MEDICAL ROS:  Reports sexual side effects (decreased drive and difficulty maintaining erection) Denies diarrhea, constipation, nausea, headaches, weight changes [increase, decrease], sedation, tremor, confusion, excessive diaphoresis, muscle twitches, bruxism, shiver and easy bruising, slowed reaction time and withdrawal symptoms.    PSYCH and CD Critical Summary Points since July 2017 July-August: Tried 2 trials of increasing sertraline to 37.5mg with increased anxiety and anger within 24 hours; obtained BeQuan testing  September: Attempted to start vilazodone-no covered by insurance and appeal denied; started desvenalfaxine 25mg   October: Increased desvenlafaxine to 50mg daily to further target anxiety and depression  November: Increased desvenlafaxine to 75mg daily  December: switched back to [employer]  of Pristiq as he found other formulation less effective  February: Started slow taper of lorazepam  May: Increased desvenlafaxine to 100mg to further target anxiety and some depression symptoms  Michelle: discontinued all scheduled lorazepam, changed to PRN    PAST PSYCH MED TRIALS   see EMR Problem List: Hx of psychiatric care    MEDICAL / SURGICAL HISTORY                                   CARE TEAM:    PCP- Dr. Brittany Penaloza   Therapist- Sofía Ramirez, MSW MPH LICSW    Patient Active Problem List   Diagnosis     Disturbance in sleep behavior     Generalized anxiety disorder     Insomnia     Panic disorder with agoraphobia     Vitamin D deficiency     Hx of psychiatric care     Moderate episode of recurrent major depressive disorder (H)     Vegan diet     Intrinsic eczema     Pain  of left lower leg       ALLERGY                                Pineapple and Seasonal allergies  MEDICATIONS                               Current Outpatient Prescriptions   Medication Sig Dispense Refill     cetirizine (ZYRTEC) 10 MG tablet Take 10 mg by mouth       Cholecalciferol (VITAMIN D PO) Take by mouth daily       desvenlafaxine succinate (PRISTIQ) 100 MG 24 hr tablet Take 1 tablet (100 mg) by mouth daily 90 tablet 0     LORazepam (ATIVAN) 0.5 MG tablet Take 0.5 tablets (0.25 mg) by mouth 2 times daily 30 tablet 0     vitamin B complex with vitamin C (VITAMIN  B COMPLEX) TABS tablet Take 1 tablet by mouth daily       fluocinonide (LIDEX) 0.05 % ointment Apply topically 2 times daily To affected areas of eczema (avoid face, groin and axilla), for approx 2 weeks 120 g 1     fluticasone (FLONASE) 50 MCG/ACT spray Spray 1 spray into both nostrils daily (Patient not taking: Reported on 6/26/2018) 1 Bottle 3     triamcinolone (KENALOG) 0.1 % ointment Apply topically 2 times daily Avoid face, underarms, groin 80 g 3     vitamin D (ERGOCALCIFEROL) 52458 UNIT capsule Take 1 capsule (50,000 Units) by mouth every 7 days 8 capsule 0     VITALS   /75  Pulse 73  Wt 84.3 kg (185 lb 12.8 oz)  BMI 30.26 kg/m2   MENTAL STATUS EXAM                                                           Alertness: alert  and oriented  Appearance: well groomed and appropriately dressed for weather  Behavior/Demeanor: cooperative and pleasant, with good  eye contact   Speech: normal  Language: intact  Psychomotor:  normal or unremarkable  Mood: irritable   Affect: full range; was congruent to mood; was congruent to content  Thought Process/Associations: unremarkable  Thought Content:  Reports none;  Denies suicidal ideation, violent ideation and delusions  Perception:  Reports none;  Denies auditory hallucinations and visual hallucinations  Insight: good   Judgment: good  Cognition: does  appear grossly intact; formal cognitive  testing was not done    LABS and DATA     RATING SCALES:  none     PHQ9 TODAY = 15  PHQ-9 SCORE 5/22/2018 6/26/2018 8/2/2018   Total Score 8 9 7     DIAGNOSIS     WILLIS with agoraphobia (improving)  Major Depression Disorder, single episode, moderate    ASSESSMENT                                     TODAY Subhash reports some increase in irritability 2/2 to stressors but overall relative stability in mood symptoms. Continues to have some anxiety at times but has been able to manage with other coping mechanisms and also able to process stressors within therapy sessions. He was able to fly alone which is a great improvement from the level of anxiety he was experiencing within the past year. Does understand he has lorazepam available PRN should anxiety increase and he have episodes of panic but he has not had to use this since June. Will consider discontinuing this PRN if stability in anxiety continues but patient requests to have it for emergencies at this time given recent increase in stressors. Did have some passive SI in past few months but no suicidal thoughts or safety concerns today. He will follow up in 2-3 months per his request and understands he can be seen sooner if needed.     SUICIDE RISK ASSESSMENT:    Hal Woo reports passive suicidal ideation in past 3 months but denies having any thoughts of harming self or others today.  In addition, he has notable risk factors for self-harm including male and hx of significant anxiety.  However, risk is mitigated by no h/o suicide attempt, no plan or intent, no h/o risky impulsive behavior, no access to lethal means, h/o seeking help when needed, future oriented, none to minimal alcohol use , commitment to family, good social support  , stable housing and good job situation.  Based on all available evidence he does not appear to be at imminent risk for self-harm therefore does not meet criteria for a 72-hr hold/  involuntary hospitalization.  However, based on  degree of symptoms continuing with therapy and close psychiatric follow up was recommended which the pt did agree to.  Additional steps to minimize risk include: anxiety/ insomnia mngmt.  Safety Plan placed in AVS: No: no safety concerns today .    CONTROLLED SUBSTANCE STATEMENT:  The use of Lorazepam (Ativan) is indicated and appropriate.  This regimen is both beneficial and well tolerated with no adverse effects or tolerance.  There is no evidence of abuse of this medication or other substances.  Warnings related to abuse potential, street value, adverse effects, abrupt cessation, withdrawal and need for emergent care have been discussed and are understood by the patient.  The patient has verbalized clear understanding of safety issues as well as the need to control use.  Plan to continue use at this time.   Controlled Substance Contract was not completed.    [place] PRESCRIPTION MONITORING PROGRAM [] was not checked today:  indicates pt only receiving prescription from this clinic, no early refills or evidence of misuse/abuse    PSYCHOTROPIC DRUG INTERACTIONS:   None.  MANAGEMENT:  N/A     PLAN                                                                                                       1) PSYCHOTROPIC MEDICATIONS:  - continue desvenlafaxine 100mg daily (prescribe Radialogica )   - continue lorazepam 0.25mg BID PRN (has not taken since last appointment)    2) THERAPY:  Continue    3) NEXT DUE:    Labs- N/A  Rating Scales- N/A    4) REFERRALS:    NONE    5) RTC: 2-3 months    6) CRISIS NUMBERS:   Provided routinely in AVS.  Especially emphasized:  ONLY if a JOSTIN PT: [facility] [phone] (clinic), [phone] (after hours)      TREATMENT RISK STATEMENT:  The risks, benefits, alternatives and potential adverse effects have been discussed and are understood by the patient/ patient's guardian. The pt understands the risks of using street drugs or alcohol.  There are no medical  contraindications, the pt agrees to treatment with the ability to do so.  The patient understands to call 911 or come to the nearest ED if life threatening or urgent symptoms present.    PSYCHIATRY CLINIC INDIVIDUAL PSYCHOTHERAPY NOTE                                    16   Start time: 0840              End time: 0900  Date revised: 11/7/18     Date next due: 2/7/19  Subjective: This supportive psychotherapy session addressed issues related to goals of therapy and current stressors including recent injury, death of friends father, relationship changes and upcoming graduation.  Patient's reaction: Preparatory in the context of mental status appropriate for ambulatory setting.  Progress: fair  Plan: RTC 2-3 months  Psychotherapy services during this visit included myself and Subhash.   TREATMENT  PLAN          SYMPTOMS;PROBLEMS   MEASURABLE GOALS;    FUNCTIONAL IMPROVEMENT INTERVENTIONS;    GAINS MADE DISCHARGE CRITERIA   Anxiety: nervous/tense/restless/overwhelmed   Utilize mindfulness  -attend mindfulness/meditation classes 1x per month to continue practice and be intentional about incorporating mindfulness daily (has not been attending classes, finding it more difficult recently to incorporate daily mindfulness but working to be more intentional about this) marked symptom improvement   Depression: depressed mood   improve/ maintain good physical health practices as a coping skill by exercising at least every other day and restart yoga practice in the New Year -exercise schedule greatly impacted by recent injury, feels he is able to return to previous schedule of 5x per week; as well as go to yoga 2x per month  (has not been to yoga sinceJune) marked symptom improvement     RESIDENT:   She Rivera, DO    Patient not staffed in clinic, note will be reviewed and signed by supervisor Dr. Brown.  I did not see this pt directly. I have reviewed the documentation, and I agree with the resident's plan of care.    Rody  NENO Brown

## 2018-11-07 NOTE — MR AVS SNAPSHOT
After Visit Summary   11/7/2018    Hal Woo    MRN: [id number]           Patient Information     Date Of Birth          1990        Visit Information        Provider Department      11/7/2018 8:30 AM She Rivera MD Psychiatry Clinic        Today's Diagnoses     WILLIS (generalized anxiety disorder)    -  1      Care Instructions    -continue current medications without changes  -return to clinic in 2-3 months or sooner if needed                   Thank you for coming to the PSYCHIATRY CLINIC.    Lab Testing:  If you had lab testing today and your results are reassuring or normal they will be mailed to you or sent through jellyfish within 7 days.   If the lab tests need quick action we will call you with the results.  The phone number we will call with results is # [phone] (home) . If this is not the best number please call our clinic and change the number.    Medication Refills:  If you need any refills please call your pharmacy and they will contact us. Our fax number for refills is [phone]. Please allow three business for refill processing.   If you need to  your refill at a new pharmacy, please contact the new pharmacy directly. The new pharmacy will help you get your medications transferred.     Scheduling:  If you have any concerns about today's visit or wish to schedule another appointment please call our office during normal business hours [phone] (8-5:00 M-F)    Contact Us:  Please call [phone] during business hours (8-5:00 M-F).  If after clinic hours, or on the weekend, please call  [phone].    Financial Assistance [phone]  [employer] Billing [phone]  [place] Billing [phone]  Medical Records [phone]      MENTAL HEALTH CRISIS NUMBERS:  [facility]:   [facility] - [phone]   Crisis Residence hospitals - [facility] - [phone]   Walk-In Counseling Center hospitals - [phone]   [facility]E 24/7 [place]  Mobile Team for Adults - [[phone]]; Child - [[phone]]     Crisis Connection - [phone]     [facility]:   [facility] - [phone]   Walk-in counseling [facility] - [phone]   Walk-in counseling [facility] - [phone]   Crisis Residence [facility] - [phone]   Urgent Care Adult Mental Health:   --Drop-in, 24/7 crisis line, and [place] [employer] Mobile Team [[phone]]    CRISIS TEXT LINE: Text [phone] from anywhere, anytime, any crisis 24/7;    OR SEE www.crisistextline.org     Poison Control Center - [phone]    CHILD: [facility] needs assessment team - [phone]     Trans Lifeline - 1-988.719.2023; or [employer] Lifeline - 1-542.670.4301    If you have a medical emergency please call 911or go to the nearest ER.                    _____________________________________________    Again thank you for choosing PSYCHIATRY CLINIC and please let us know how we can best partner with you to improve you and your family's health.  You may be receiving a survey in the mail regarding this appointment. We would love to have your feedback, both positive and negative, so please fill out the survey and return it using the provided envelope. The survey is done by an external company, so your answers are anonymous.           Follow-ups after your visit        Follow-up notes from your care team     Return in about 10 weeks (around 1/16/2019) for 30 MFU.      Your next 10 appointments already scheduled     Jan 16, 2019  8:00 AM CST   Adult Med Follow UP with She Rivera MD   Psychiatry Clinic ([facility])    [address] [id number] [address] [id number] [phone]              Who to contact     Please call your clinic at [phone] to:    Ask questions about your health    Make or cancel appointments    Discuss your medicines    Learn about your test  results    Speak to your doctor            Additional Information About Your Visit        [employer] Information     Spock gives you secure access to your electronic health record. If you see a primary care provider, you can also send messages to your care team and make appointments. If you have questions, please call your primary care clinic.  If you do not have a primary care provider, please call [phone] and they will assist you.      Spock is an electronic gateway that provides easy, online access to your medical records. With Spock, you can request a clinic appointment, read your test results, renew a prescription or communicate with your care team.     To access your existing account, please contact your [facility] or call [phone] for assistance.        Care EveryWhere ID     This is your Care EveryWhere ID. This could be used by other organizations to access your [facility] medical records  [id number]        Your Vitals Were     Pulse BMI (Body Mass Index)                73 30.26 kg/m2           Blood Pressure from Last 3 Encounters:   11/07/18 114/75   08/02/18 128/75   06/26/18 128/76    Weight from Last 3 Encounters:   11/07/18 84.3 kg (185 lb 12.8 oz)   08/02/18 86.4 kg (190 lb 6.4 oz)   06/26/18 85.5 kg (188 lb 6.4 oz)              Today, you had the following     No orders found for display         Where to get your medicines      These medications were sent to [facility][id number] - [address] [address]     Phone:  [phone]     desvenlafaxine succinate 100 MG 24 hr tablet          Primary Care Provider Office Phone # Fax #    Leona Leon PA-C [phone]       [address] [place] [address]        Equal Access to Services     VAMSHI DODSON : Pernell Buchanan, elena iverson, rich tobias  lamary garcía. [facility] [phone].    ATENCIÓN: Si tamia farley, tiene a anne disposición servicios gratuitos de asistencia lingüística. Ede pitt [phone].    We comply with applicable federal civil rights laws and [place] laws. We do not discriminate on the basis of race, color, national origin, age, disability, sex, sexual orientation, or gender identity.            Thank you!     Thank you for choosing PSYCHIATRY CLINIC  for your care. Our goal is always to provide you with excellent care. Hearing back from our patients is one way we can continue to improve our services. Please take a few minutes to complete the written survey that you may receive in the mail after your visit with us. Thank you!             Your Updated Medication List - Protect others around you: Learn how to safely use, store and throw away your medicines at www.disposemymeds.org.          This list is accurate as of 11/7/18 11:59 PM.  Always use your most recent med list.                   Brand Name Dispense Instructions for use Diagnosis    cetirizine 10 MG tablet    zyrTEC     Take 10 mg by mouth        desvenlafaxine succinate 100 MG 24 hr tablet    PRISTIQ    90 tablet    Take 1 tablet (100 mg) by mouth daily    WILLIS (generalized anxiety disorder)       fluocinonide 0.05 % ointment    LIDEX    120 g    Apply topically 2 times daily To affected areas of eczema (avoid face, groin and axilla), for approx 2 weeks    Intrinsic atopic dermatitis       fluticasone 50 MCG/ACT spray    FLONASE    1 Bottle    Spray 1 spray into both nostrils daily    Acute seasonal allergic rhinitis due to pollen       LORazepam 0.5 MG tablet    ATIVAN    30 tablet    Take 0.5 tablets (0.25 mg) by mouth 2 times daily    WILLIS (generalized anxiety disorder)       triamcinolone 0.1 % ointment    KENALOG    80 g    Apply topically 2 times daily Avoid face, underarms, groin    Other atopic dermatitis       vitamin B complex with vitamin C Tabs tablet      Take 1 tablet  by mouth daily        VITAMIN D PO      Take by mouth daily        vitamin D2 14971 UNIT capsule    ERGOCALCIFEROL    8 capsule    Take 1 capsule (50,000 Units) by mouth every 7 days    Vitamin D deficiency

## 2019-01-16 ENCOUNTER — OFFICE VISIT (OUTPATIENT)
Dept: PSYCHIATRY | Facility: CLINIC | Age: 29
End: 2019-01-16
Attending: PSYCHIATRY & NEUROLOGY
Payer: COMMERCIAL

## 2019-01-16 VITALS
WEIGHT: 170.8 LBS | BODY MASS INDEX: 27.81 KG/M2 | DIASTOLIC BLOOD PRESSURE: 78 MMHG | SYSTOLIC BLOOD PRESSURE: 126 MMHG | HEART RATE: 62 BPM

## 2019-01-16 DIAGNOSIS — F41.1 GAD (GENERALIZED ANXIETY DISORDER): Primary | ICD-10-CM

## 2019-01-16 PROCEDURE — G0463 HOSPITAL OUTPT CLINIC VISIT: HCPCS | Mod: ZF

## 2019-01-16 RX ORDER — DESVENLAFAXINE 100 MG/1
100 TABLET, EXTENDED RELEASE ORAL DAILY
Qty: 90 TABLET | Refills: 0 | Status: SHIPPED | OUTPATIENT
Start: 2019-01-16 | End: 2019-04-17

## 2019-01-16 ASSESSMENT — PAIN SCALES - GENERAL: PAINLEVEL: NO PAIN (0)

## 2019-01-16 NOTE — PROGRESS NOTES
"  PSYCHIATRY CLINIC PROGRESS NOTE   The initial diagnostic evaluation was on 2/13/17 and the last transfer of care evaluation was 7/11/17.    Pertinent Background:  This patient first experienced mental health issues in childhood where he saw a therapist in grade school and has received treatment for depression and anxiety.  See transfer evaluation for detailed history.  Notably, depression present in childhood along with a low level of anxiety, first noticed an increase in anxiety around May 2011 after graduation from college and moving in with parents for 2 months in the fall of that year. Oct 2011 he had first \"panic attack\" while at work in context of overuse of caffeine to help aid a hangover from a previous night of drinking EtOH. Since 2011 has had a high amount of \"fear and worry\" which is prominent when leaving his home and exacerbated by feeling like he is trapped and does not have an escape route. Hx of passive SI. Has experienced side effects (\"brain on fire\") to every medication tried aside from mirtazapine which was stopped 2/2 weight gain.      Psych critical item history includes suicidal ideation.    INTERIM HISTORY                                                 Hal Woo is a 28 year old male who was last seen in clinic on 11/7/18 at which time no changes were made.  The patient reports good treatment adherence. History was provided by the patient who was a good historian.  Since the last visit:  -things have been going well, feels his mood is \"good\", only had one episode of anxiety that he felt was triggered by feeling physically \"off\" when he ran out of his allergy medications  -denies having any suicidal ideation since last visit   -continues to tolerate the desvenlafaxine, does have some decreased libido that he will continue to monitor but not concerning enough at this time for him to consider medication adjustment   -has not used any lorazepam in over 6 months, does continue to carry " tablets on his person in case he experiences increasing anxiety or panic and would prefer to continue to keep PRN script at this time; did discuss eventually discontinuing medication in the future with significant period of stability but does have many pending transitions with moving/new job/etc. So will not change today   -finished grad school last month, has taken the past month to recover and relax, feels drained and discussed significant impact stress can have on the body   -just started to look and apply for positions in [place], will continue to keep clinic updated and if he has a pending move will come in for visit prior to this to address transition out of clinic   -he has been focusing on improving his physical health, his calf injury has improved, still has some pain, but has been able to get back to his regular work out routine   -continues to be engaged in therapy, did go about 6 weeks without a visit due to scheduling difficulties/holidays but back to visits every 2 weeks now    RECENT SYMPTOMS:   DEPRESSION:  reports-depressed mood, anhedonia, low energy, insomnia, weight changes and psychomotor changes [fidgeting];  DENIES- suicidal ideation, poor concentration /memory and excessive guilt  DEVAUGHN/HYPOMANIA:  reports-none;  DENIES- increased energy, decreased sleep need, increased activity and grandiosity  PSYCHOSIS:  reports-none;  DENIES- delusions, auditory hallucinations and visual hallucinations  DYSREGULATION:  reports-none;  DENIES- suicidal ideation, violent ideation and SIB  PANIC ATTACK:  none   ANXIETY:  social anxiety and nervous/overwhelmed  TRAUMA RELATED:  none  COMPULSIVE:  none  ATTENTION:  none  EATING DISORDER: none    RECENT SUBSTANCE USE:     ALCOHOL- occasional (1 beer per week)  TOBACCO- none            CAFFEINE- limited intake 2/2 anxiety, but has been slowing increasing amount, will stop drinking coffee by 4pm and at times drinks half-caf          CANNABIS- none  OPIOIDS- none             NARCAN KIT- N/A       OTHER ILLICIT DRUGS- none     CURRENT SOCIAL HISTORY:  FINANCIAL SUPPORT- working       CHILDREN- none       LIVING SITUATION- lives alone in apartment      SOCIAL/ SPIRITUAL SUPPORT- friends, family, therapist      FEELS SAFE AT HOME- Yes     MEDICAL ROS:  Reports sexual side effects (decreased drive and difficulty maintaining erection) Denies diarrhea, constipation, nausea, headaches, weight changes [increase, decrease], sedation, tremor, confusion, excessive diaphoresis, muscle twitches, bruxism, shiver and easy bruising, slowed reaction time and withdrawal symptoms.    PSYCH and CD Critical Summary Points since July 2017 July-August: Tried 2 trials of increasing sertraline to 37.5mg with increased anxiety and anger within 24 hours; obtained Qbix testing  September: Attempted to start vilazodone-no covered by insurance and appeal denied; started desvenalfaxine 25mg   October: Increased desvenlafaxine to 50mg daily to further target anxiety and depression  November: Increased desvenlafaxine to 75mg daily  December: switched back to [employer]  of Pristiq as he found other formulation less effective  February: Started slow taper of lorazepam  May: Increased desvenlafaxine to 100mg to further target anxiety and some depression symptoms  Michelle: discontinued all scheduled lorazepam, changed to PRN    PAST PSYCH MED TRIALS   see EMR Problem List: Hx of psychiatric care    MEDICAL / SURGICAL HISTORY                                   CARE TEAM:    PCP- Dr. Brittany Penaloza   Therapist- Sofía Ramirez MSW MPH LICSW    Patient Active Problem List   Diagnosis     Disturbance in sleep behavior     Generalized anxiety disorder     Insomnia     Panic disorder with agoraphobia     Vitamin D deficiency     Hx of psychiatric care     Moderate episode of recurrent major depressive disorder (H)     Vegan diet     Intrinsic eczema     Pain of left lower leg       ALLERGY                                 Pineapple and Seasonal allergies  MEDICATIONS                               Current Outpatient Medications   Medication Sig Dispense Refill     cetirizine (ZYRTEC) 10 MG tablet Take 10 mg by mouth       Cholecalciferol (VITAMIN D PO) Take by mouth daily       desvenlafaxine succinate (PRISTIQ) 100 MG 24 hr tablet Take 1 tablet (100 mg) by mouth daily 90 tablet 0     fluocinonide (LIDEX) 0.05 % ointment Apply topically 2 times daily To affected areas of eczema (avoid face, groin and axilla), for approx 2 weeks 120 g 1     LORazepam (ATIVAN) 0.5 MG tablet Take 0.5 tablets (0.25 mg) by mouth 2 times daily 30 tablet 0     triamcinolone (KENALOG) 0.1 % ointment Apply topically 2 times daily Avoid face, underarms, groin 80 g 3     vitamin B complex with vitamin C (VITAMIN  B COMPLEX) TABS tablet Take 1 tablet by mouth daily       fluticasone (FLONASE) 50 MCG/ACT spray Spray 1 spray into both nostrils daily (Patient not taking: Reported on 6/26/2018) 1 Bottle 3     vitamin D (ERGOCALCIFEROL) 64588 UNIT capsule Take 1 capsule (50,000 Units) by mouth every 7 days (Patient not taking: Reported on 1/16/2019) 8 capsule 0     VITALS   /78   Pulse 62   Wt 77.5 kg (170 lb 12.8 oz)   BMI 27.81 kg/m     MENTAL STATUS EXAM                                                           Alertness: alert  and oriented  Appearance: well groomed and appropriately dressed for weather  Behavior/Demeanor: cooperative and pleasant, with good  eye contact   Speech: normal  Language: intact  Psychomotor:  normal or unremarkable  Mood: description consistent with euthymia   Affect: full range; was congruent to mood; was congruent to content  Thought Process/Associations: unremarkable  Thought Content:  Reports none;  Denies suicidal ideation, violent ideation and delusions  Perception:  Reports none;  Denies auditory hallucinations and visual hallucinations  Insight: good   Judgment: good  Cognition: does  appear grossly  intact; formal cognitive testing was not done    LABS and DATA     RATING SCALES:  none     PHQ9 TODAY = 9  PHQ-9 SCORE 6/26/2018 8/2/2018 11/7/2018   PHQ-9 Total Score 9 7 15     DIAGNOSIS     WILLIS with agoraphobia (improving)  Major Depression Disorder, single episode, mild    ASSESSMENT                                     TODAY Subhash reports overall stability in mood symptoms. Continues to have some anxiety at times but has been able to manage with other coping mechanisms and also able to process stressors within therapy sessions. Does continue to have lorazepam available PRN should anxiety increase and he have episodes of panic but he has not had to use this since June. Will consider discontinuing this PRN if stability in anxiety continues but patient requests to have it for emergencies at this time given many upcoming transitions (new job, potential move, etc). Tolerating his desvenlafaxine, with only noted side effect to be continued fluctuating decreased libido, will continue to monitor, he denies desire to change medications at this time. Has not had any further passive suicidal thoughts since this fall. He will follow up in 3 months per his request and understands he can be seen sooner if needed.     CONTROLLED SUBSTANCE STATEMENT:  The use of Lorazepam (Ativan) is indicated and appropriate.  This regimen is both beneficial and well tolerated with no adverse effects or tolerance.  There is no evidence of abuse of this medication or other substances.  Warnings related to abuse potential, street value, adverse effects, abrupt cessation, withdrawal and need for emergent care have been discussed and are understood by the patient.  The patient has verbalized clear understanding of safety issues as well as the need to control use.  Plan to continue use at this time.   Controlled Substance Contract was not completed.    [place] PRESCRIPTION MONITORING PROGRAM [] was not checked today:  indicates pt only receiving  prescription from this clinic, no early refills or evidence of misuse/abuse    PSYCHOTROPIC DRUG INTERACTIONS:   None.  MANAGEMENT:  N/A     PLAN                                                                                                       1) PSYCHOTROPIC MEDICATIONS:  - continue desvenlafaxine 100mg daily (prescribe [employer] )   - continue lorazepam 0.25mg BID PRN (has not taken since June)    2) THERAPY:  Continue    3) NEXT DUE:    Labs- N/A  Rating Scales- N/A    4) REFERRALS:    NONE    5) RTC: 3 months    6) CRISIS NUMBERS:   Provided routinely in AVS.  Especially emphasized:  ONLY if a JOSTIN PT: [facility] [phone] (clinic), [phone] (after hours)      TREATMENT RISK STATEMENT:  The risks, benefits, alternatives and potential adverse effects have been discussed and are understood by the patient/ patient's guardian. The pt understands the risks of using street drugs or alcohol.  There are no medical contraindications, the pt agrees to treatment with the ability to do so.  The patient understands to call 911 or come to the nearest ED if life threatening or urgent symptoms present.    PSYCHIATRY CLINIC INDIVIDUAL PSYCHOTHERAPY NOTE                                    16   Start time: 0810              End time: 0830  Date revised: 11/7/18     Date next due: 2/7/19  Subjective: This supportive psychotherapy session addressed issues related to goals of therapy and current stressors including transition from graduation to looking for potential new employment. Patient's reaction: Preparatory in the context of mental status appropriate for ambulatory setting.  Progress: fair  Plan: RTC 3 months  Psychotherapy services during this visit included myself and Subhash.   TREATMENT  PLAN          SYMPTOMS;PROBLEMS   MEASURABLE GOALS;    FUNCTIONAL IMPROVEMENT INTERVENTIONS;    GAINS MADE DISCHARGE CRITERIA   Anxiety: nervous/tense/restless/overwhelmed   Utilize mindfulness  -attend  mindfulness/meditation classes 1x per month to continue practice and be intentional about incorporating mindfulness daily (has not been attending classes, finding it more difficult recently to incorporate daily mindfulness but working to be more intentional about this) marked symptom improvement   Depression: depressed mood   improve/ maintain good physical health practices as a coping skill by exercising at least every other day and restart yoga practice in the New Year -injury has improved,  he has been able to return to previous schedule of 5x per week; has not gone to Yoga recently marked symptom improvement     RESIDENT:   She Rivera, DO    Patient not staffed in clinic, note will be reviewed and signed by supervisor Dr. Brown.  I did not see this pt directly. I have reviewed the documentation, and I agree with the resident's plan of care.    Rody Brown

## 2019-01-17 ASSESSMENT — PATIENT HEALTH QUESTIONNAIRE - PHQ9: SUM OF ALL RESPONSES TO PHQ QUESTIONS 1-9: 9

## 2019-02-11 ENCOUNTER — OFFICE VISIT (OUTPATIENT)
Dept: FAMILY MEDICINE | Facility: CLINIC | Age: 29
End: 2019-02-11
Payer: COMMERCIAL

## 2019-02-11 VITALS
TEMPERATURE: 98.1 F | OXYGEN SATURATION: 100 % | DIASTOLIC BLOOD PRESSURE: 74 MMHG | RESPIRATION RATE: 16 BRPM | BODY MASS INDEX: 27.97 KG/M2 | HEART RATE: 73 BPM | WEIGHT: 171.75 LBS | SYSTOLIC BLOOD PRESSURE: 114 MMHG

## 2019-02-11 DIAGNOSIS — J32.9 RECURRENT RHINOSINUSITIS: ICD-10-CM

## 2019-02-11 DIAGNOSIS — R36.1 HEMATOSPERMIA: Primary | ICD-10-CM

## 2019-02-11 PROBLEM — Z78.9 VEGAN DIET: Chronic | Status: ACTIVE | Noted: 2018-02-06

## 2019-02-11 PROBLEM — Z92.89 HX OF PSYCHIATRIC CARE: Chronic | Status: ACTIVE | Noted: 2017-07-12

## 2019-02-11 PROBLEM — L20.84 INTRINSIC ECZEMA: Chronic | Status: ACTIVE | Noted: 2018-02-06

## 2019-02-11 PROBLEM — M79.662 PAIN OF LEFT LOWER LEG: Status: RESOLVED | Noted: 2018-10-09 | Resolved: 2019-02-11

## 2019-02-11 LAB
% GRANULOCYTES: 61.9 %G (ref 40–75)
ALBUMIN SERPL-MCNC: 3.9 G/DL (ref 3.3–4.6)
ALP SERPL-CCNC: 69 U/L (ref 40–150)
ALT SERPL-CCNC: 39 U/L (ref 0–70)
APTT PPP: 30 SEC (ref 22–37)
AST SERPL-CCNC: 31 U/L (ref 0–55)
BILIRUB SERPL-MCNC: 0.7 MG/DL (ref 0.2–1.3)
BILIRUBIN UR: NEGATIVE
BLOOD UR: NEGATIVE
BUN SERPL-MCNC: 10 MG/DL (ref 5–24)
CALCIUM SERPL-MCNC: 9.2 MG/DL (ref 8.5–10.4)
CHLORIDE SERPLBLD-SCNC: 95 MMOL/L (ref 94–109)
CO2 SERPL-SCNC: 30 MMOL/L (ref 20–32)
CREAT SERPL-MCNC: 0.9 MG/DL (ref 0.8–1.5)
EGFR CALCULATED (BLACK REFERENCE): 128.3
EGFR CALCULATED (NON BLACK REFERENCE): 106
ERYTHROCYTE [DISTWIDTH] IN BLOOD BY AUTOMATED COUNT: 13.7 %
GLUCOSE SERPL-MCNC: 60 MG/DL (ref 60–109)
GLUCOSE URINE: NEGATIVE
GRANULOCYTES #: 3.3 K/UL (ref 1.6–8.3)
HCT VFR BLD AUTO: 42.3 % (ref 40–53)
HEMOGLOBIN: 15.1 G/DL (ref 13.3–17.7)
INR PPP: 1.12 (ref 0.86–1.14)
KETONES UR QL: NEGATIVE
LEUKOCYTE ESTERASE UR: NEGATIVE
LYMPHOCYTES # BLD AUTO: 1.5 K/UL (ref 0.8–5.3)
LYMPHOCYTES NFR BLD AUTO: 29.6 %L (ref 20–48)
MCH RBC QN AUTO: 30.4 PG (ref 26.5–35)
MCHC RBC AUTO-ENTMCNC: 35.7 G/DL (ref 32–36)
MCV RBC AUTO: 85.3 FL (ref 78–100)
MID #: 0.4 K/UL (ref 0–2.2)
MID %: 8.5 %M (ref 0–20)
NITRITE UR QL STRIP: NEGATIVE
PH UR STRIP: 7 [PH] (ref 5–7)
PLATELET # BLD AUTO: 238 K/UL (ref 150–450)
POTASSIUM SERPL-SCNC: 4.5 MMOL/L (ref 3.4–5.3)
PROT SERPL-MCNC: 7.5 G/DL (ref 6.8–8.8)
PROTEIN UR: NEGATIVE
RBC # BLD AUTO: 4.96 M/UL (ref 4.4–5.9)
SODIUM SERPL-SCNC: 139 MMOL/L (ref 136–145)
SP GR UR STRIP: <=1.005
UROBILINOGEN UR STRIP-ACNC: NORMAL
WBC # BLD AUTO: 5.2 K/UL (ref 4–11)

## 2019-02-11 RX ORDER — FLUTICASONE PROPIONATE 50 MCG
1 SPRAY, SUSPENSION (ML) NASAL DAILY
Qty: 1 BOTTLE | Refills: 3 | Status: SHIPPED | OUTPATIENT
Start: 2019-02-11 | End: 2020-02-11

## 2019-02-11 ASSESSMENT — ANXIETY QUESTIONNAIRES
GAD7 TOTAL SCORE: 11
1. FEELING NERVOUS, ANXIOUS, OR ON EDGE: SEVERAL DAYS
3. WORRYING TOO MUCH ABOUT DIFFERENT THINGS: MORE THAN HALF THE DAYS
IF YOU CHECKED OFF ANY PROBLEMS ON THIS QUESTIONNAIRE, HOW DIFFICULT HAVE THESE PROBLEMS MADE IT FOR YOU TO DO YOUR WORK, TAKE CARE OF THINGS AT HOME, OR GET ALONG WITH OTHER PEOPLE: SOMEWHAT DIFFICULT
5. BEING SO RESTLESS THAT IT IS HARD TO SIT STILL: NOT AT ALL
6. BECOMING EASILY ANNOYED OR IRRITABLE: MORE THAN HALF THE DAYS
2. NOT BEING ABLE TO STOP OR CONTROL WORRYING: MORE THAN HALF THE DAYS
7. FEELING AFRAID AS IF SOMETHING AWFUL MIGHT HAPPEN: SEVERAL DAYS

## 2019-02-11 ASSESSMENT — PATIENT HEALTH QUESTIONNAIRE - PHQ9
SUM OF ALL RESPONSES TO PHQ QUESTIONS 1-9: 9
5. POOR APPETITE OR OVEREATING: NEARLY EVERY DAY

## 2019-02-11 NOTE — NURSING NOTE
28 year old  Chief Complaint   Patient presents with     Sinusitis     Urinary Problem     hematospermia hx        Blood pressure 114/74, pulse 73, temperature 98.1  F (36.7  C), temperature source Oral, resp. rate 16, weight 77.9 kg (171 lb 12 oz), SpO2 100 %. Body mass index is 27.97 kg/m .  Patient Active Problem List   Diagnosis     Disturbance in sleep behavior     Generalized anxiety disorder     Insomnia     Panic disorder with agoraphobia     Vitamin D deficiency     Hx of psychiatric care     Moderate episode of recurrent major depressive disorder (H)     Vegan diet     Intrinsic eczema     Pain of left lower leg       Wt Readings from Last 2 Encounters:   02/11/19 77.9 kg (171 lb 12 oz)   02/06/18 86.6 kg (191 lb)     BP Readings from Last 3 Encounters:   02/11/19 114/74   02/06/18 128/75   02/02/18 125/75         Current Outpatient Medications   Medication     cetirizine (ZYRTEC) 10 MG tablet     Cholecalciferol (VITAMIN D PO)     desvenlafaxine (PRISTIQ) 100 MG 24 hr tablet     fluocinonide (LIDEX) 0.05 % ointment     LORazepam (ATIVAN) 0.5 MG tablet     triamcinolone (KENALOG) 0.1 % ointment     fluticasone (FLONASE) 50 MCG/ACT spray     vitamin B complex with vitamin C (VITAMIN  B COMPLEX) TABS tablet     vitamin D (ERGOCALCIFEROL) 04495 UNIT capsule     No current facility-administered medications for this visit.        Social History     Tobacco Use     Smoking status: Never Smoker     Smokeless tobacco: Never Used   Substance Use Topics     Alcohol use: Yes     Alcohol/week: 1.2 oz     Types: 2 Standard drinks or equivalent per week     Drug use: No       Health Maintenance Due   Topic Date Due     DEPRESSION ACTION PLAN  04/03/2007     HIV SCREEN (SYSTEM ASSIGNED)  04/03/2007     INFLUENZA VACCINE (1) 09/01/2018       No results found for: PAP      February 11, 2019 9:41 AM

## 2019-02-11 NOTE — PROGRESS NOTES
"  SUBJECTIVE:   Hal Woo is a 28 year old male who presents to clinic today for a return visit.    # Sinus  - sore throat, congestion, headache, fatigue, \"clouded thoughts\", mild cough, sinus pressure  - no tooth pain or ear pain  - current symptoms past 4 days  - seems to be happening every month, has had at least 10 times in the past year  - lasts about a week each time  - no pets  - home is very dry  - has seasonal allergies, unsure how associated with current symptoms  - has taken motrin, sudafed without relief  - nyquil helps with sleep  - no saline rinses     # Hematospermia  - since high school, but worse in past year and a half  - saws urology Dr. Butt 2/2/18, who advised observation if normal CT  - comes and goes  - past 2 weeks has had more significant bleeding with orgasms - a few tablespoons  - no hematuria, no urinary urgency  - has not had sex in 10 years  - does feel that he bruises more easily than others but has not noticed any unusual/prolonged bleeding  - normal UA 2/2/18 with Dr. Butt  - 2/6/18 Platelets 205    PSA   Date Value Ref Range Status   02/02/2018 1.17 0 - 4 ug/L Final     Comment:     Assay Method:  Chemiluminescence using Siemens Vista analyzer     2/2/18 CT Pelvis:  Pelvis: Dystrophic calcification in the nonenlarged prostate.  Symmetric seminal vesicles. Bilateral vas deferens are visualized  extending into the inguinal canals. The bladder and visualized bowel  are unremarkable. No free fluid in the pelvis. No pelvic  Lymphadenopathy.    ROS: Denies fevers, chills, chest pain, difficulty breathing, abdominal pain    Patient Active Problem List   Diagnosis     Disturbance in sleep behavior     Generalized anxiety disorder     Insomnia     Panic disorder with agoraphobia     Vitamin D deficiency     Hx of psychiatric care     Moderate episode of recurrent major depressive disorder (H)     Vegan diet     Intrinsic eczema     Pain of left lower leg     Current Outpatient " Medications   Medication     cetirizine (ZYRTEC) 10 MG tablet     Cholecalciferol (VITAMIN D PO)     desvenlafaxine (PRISTIQ) 100 MG 24 hr tablet     fluocinonide (LIDEX) 0.05 % ointment     fluticasone (FLONASE) 50 MCG/ACT nasal spray     LORazepam (ATIVAN) 0.5 MG tablet     triamcinolone (KENALOG) 0.1 % ointment     fluticasone (FLONASE) 50 MCG/ACT spray     vitamin B complex with vitamin C (VITAMIN  B COMPLEX) TABS tablet     vitamin D (ERGOCALCIFEROL) 34504 UNIT capsule     No current facility-administered medications for this visit.        I have reviewed the patient's relevant past medical history.     OBJECTIVE:   /74 (BP Location: Right arm, Patient Position: Sitting, Cuff Size: Adult Large)   Pulse 73   Temp 98.1  F (36.7  C) (Oral)   Resp 16   Wt 77.9 kg (171 lb 12 oz)   SpO2 100%   BMI 27.97 kg/m      Constitutional: well-appearing, appears stated age  Eyes: conjunctivae without erythema, sclera anicteric.   ENT: TM normal bilateral, posterior oropharynx and tonsils normal  Cardiac: regular rate and rhythm, normal S1/S2, no murmur/rubs/gallops  Respiratory: lungs clear to auscultation bilaterally, normal work of breathing, no wheezes/crackles  Skin: no rashes, lesions, or wounds  Psych: affect is full and appropriate, speech is fluent and non-pressured    Results for orders placed or performed in visit on 02/11/19   Urinalysis ([place])   Result Value Ref Range    Specific Gravity Urine <=1.005 1.005 - 1.030    pH Urine 7.0 4.5 - 8.0    Leukocyte Esterase UR Negative Negative    Nitrite Urine Negative Negative    Protein UR Negative Negative    Glucose Urine Negative Negative    Ketones Urine Negative Negative    Urobilinogen mg/dL 0.2 E.U./dL 0.2 E.U./dL    Bilirubin UR Negative Negative    Blood UR Negative Negative   CBC with Diff Plt ([facility])   Result Value Ref Range    WBC 5.2 4.0 - 11.0 K/uL    Lymphocytes # 1.5 0.8 - 5.3 K/uL    % Lymphocytes 29.6 20.0 - 48.0 %L    Mid # 0.4 0.0 - 2.2  K/uL    Mid % 8.5 0.0 - 20.0 %M    GRANULOCYTES # 3.3 1.6 - 8.3 K/uL    % Granulocytes 61.9 40.0 - 75.0 %G    RBC 4.96 4.40 - 5.90 M/uL    Hemoglobin 15.1 13.3 - 17.7 g/dL    Hematocrit 42.3 40.0 - 53.0 %    MCV 85.3 78.0 - 100.0 fL    MCH 30.4 26.5 - 35.0 pg    MCHC 35.7 32.0 - 36.0 g/dL    Platelets 238.0 150.0 - 450.0 K/uL    RDW 13.7 %     ASSESSMENT AND PLAN:     (R36.1) Hematospermia  (primary encounter diagnosis)  Comment: Repeat UA to rule out infection but low suspicion based on symptoms - normal. No risk for GC/CT by patient report. Given how long this has been going on and how heavy it is recently, will eval for coagulation problem. Send back to urology for further evaluation.   Plan: Urinalysis ([place]), Partial thromboplastin         time, CBC with Diff Plt ([facility]), Comprehensive        Metabolic Panel ([facility]), Factor 8 assay, Von         Willebrand antigen, VWF Activity with reflex to        Ristocetin Cofactor Activity, Von Willebrand         Multimers, von Willebrand Interpretation,         UROLOGY ADULT REFERRAL, INR    (J32.9) Recurrent rhinosinusitis  Comment: Trial of treatment for allergic rhiniosinusitis with daily flonase and zyrtec/claritin. If no improvement after 2-4 weeks, plan for ENT referral.   Plan: fluticasone (FLONASE) 50 MCG/ACT nasal spray          Hal Aleman   [facility]  02/11/2019, 10:12 AM

## 2019-02-12 ENCOUNTER — PRE VISIT (OUTPATIENT)
Dept: UROLOGY | Facility: CLINIC | Age: 29
End: 2019-02-12

## 2019-02-12 ASSESSMENT — ANXIETY QUESTIONNAIRES: GAD7 TOTAL SCORE: 11

## 2019-02-12 NOTE — TELEPHONE ENCOUNTER
Patient with history of hematospermia coming in for follow up CT review. Patient chart reviewed, no need for call, all records available and ready for appointment.

## 2019-02-14 ENCOUNTER — OFFICE VISIT (OUTPATIENT)
Dept: UROLOGY | Facility: CLINIC | Age: 29
End: 2019-02-14
Payer: COMMERCIAL

## 2019-02-14 VITALS
WEIGHT: 162.3 LBS | DIASTOLIC BLOOD PRESSURE: 65 MMHG | HEIGHT: 66 IN | HEART RATE: 65 BPM | BODY MASS INDEX: 26.08 KG/M2 | SYSTOLIC BLOOD PRESSURE: 109 MMHG

## 2019-02-14 DIAGNOSIS — R36.1 HEMATOSPERMIA: Primary | ICD-10-CM

## 2019-02-14 DIAGNOSIS — R36.1 HEMATOSPERMIA: ICD-10-CM

## 2019-02-14 ASSESSMENT — PAIN SCALES - GENERAL: PAINLEVEL: NO PAIN (0)

## 2019-02-14 ASSESSMENT — MIFFLIN-ST. JEOR: SCORE: 1643.94

## 2019-02-14 NOTE — LETTER
2/14/2019       RE: Hal Woo  [address]  [facility] [address]     Dear Colleague,    Thank you for referring your patient, Hal Woo, to the [facility] UROLOGY AND INST FOR PROSTATE AND UROLOGIC CANCERS at [facility]. Please see a copy of my visit note below.    Hal Woo is a 28 year old male who  represents for hematospermia.    CC: bright red blood in ejaculate    HPI:     Subhash was last seen in urology clinic for this issue roughly 1 year ago (2/2018). At that time, he noted minor hematospermia in his ejaculate. CT 2/2018 showed minor calcification in his Left prostate, but otherwise normal. PSA was 1.1. He says he was found to have low vitamin D and his hematospermia resolved after taking vitamin D.      Today (2/2019):  - For the past 2-3 weeks, he has had bright red blood with ejaculation.   - 2/10 pain that he can't localize.  - Not sure if anything makes it better or worse.  - No new medication changes.   - No trauma to the area.   - No dysuria, hematuria or other voiding symptoms.    ROS: 12 point review of systems is negative unless otherwise noted in HPI    Past Medical History:   Diagnosis Date     Depression, major      Severe anxiety with panic 09/2014     Social History     Socioeconomic History     Marital status: Single     Spouse name: Not on file     Number of children: Not on file     Years of education: Not on file     Highest education level: Not on file   Social Needs     Financial resource strain: Not on file     Food insecurity - worry: Not on file     Food insecurity - inability: Not on file     Transportation needs - medical: Not on file     Transportation needs - non-medical: Not on file   Occupational History     Occupation:      Comment: [employer]   Tobacco Use     Smoking status: Never Smoker     Smokeless tobacco: Never Used   Substance and Sexual Activity     Alcohol use: Yes      "Alcohol/week: 1.2 oz     Types: 2 Standard drinks or equivalent per week     Drug use: No     Sexual activity: No   Other Topics Concern     Parent/sibling w/ CABG, MI or angioplasty before 65F 55M? Not Asked   Social History Narrative    Lives alone.  No pets. Live is going generally OK.  Has been stressed over the past couple weeks. Work is boring but OK. Is going to school getting his [place] at the [place]        Has a good support system. Family is supportive.  Has friends but does not see them anymore. Difficult to make new friends.    Feels safe in all environments.    Wears seatbelt 100% of the time.    Wears helmet while biking.    Denies history of abuse, past or present, physical, sexual or emotional.    Alfreda Leon PA-C    02/06/18           Objective:  Blood pressure 109/65, pulse 65, height 1.676 m (5' 6\"), weight 73.6 kg (162 lb 4.8 oz).    Constitutional: normal mood and affect.   PSYCH: normal mood and affect.  NEURO: normal gait, no focal deficits.   EYES: anicteric, EOMI, PERR.  CARDIOPULMONARY: breathing non-labored, pulse regular rate/rhythm, no peripheral edema.  GI: Abdomen soft, non-tender, nondistended.  MUSCULOSKELETAL: normal limb proportions, no muscle wasting, no contractures.  Prostate exam: 20g, nontender, anodular today.    Labs:    CBC RESULTS:   Recent Labs   Lab Test 02/11/19  1010 02/06/18  0852   WBC  --  4.6   RBC  --  5.16   HGB 15.1 15.9   HCT 42.3 44.9   MCV 85.3 87   MCH 30.4 30.8   MCHC 35.7 35.4   RDW 13.7 13.2   PLT  --  205     UA RESULTS:  Recent Labs   Lab Test 02/11/19  1010 02/02/18  0849   COLOR  --  Straw   APPEARANCE  --  Clear   URINEGLC Negative Negative   URINEBILI  --  Negative   URINEKETONE Negative Negative   SG <=1.005 1.003   UBLD  --  Negative   URINEPH 7.0 7.0   PROTEIN  --  Negative   NITRITE Negative Negative   LEUKEST  --  Negative   RBCU  --  0   WBCU  --  0     INR   Date Value Ref Range Status   02/11/2019 1.12 0.86 - 1.14 Final "      Imaging:  None    ASSESSMENT:     Hematospermia for 3 weeks. Non-tender prostate - likely benign etiology given negative workup a year ago.    PLAN:     No repeat CT scan advised at this time.    No labs today. CBC, UA, INR/PT 4 days ago were normal.    Pt not interested in empiric abx or cystoscopy at this time.    Follow up in 6 weeks if problem persists.      Scribe Disclosure:   I, Kyle Owen, am serving as a scribe; to document services personally performed by Dr. Butt- -based on data collection and the provider's statements to me.     I, Hal Butt MD, saw and evaluated this patient personally.  I formulated the treatment plan with the patient.  The medical student acted as a scribe only for the progress note, and this represents my work and has been edited by me for content and accuracy.  Discussed that hematospermia is typically benign and self-limited.  No evidence of infection or anatomic abnormality.  Recommended observation in the short term.  If symptoms persist, consider cystoscopy and trans-rectal ultrasound of the prostate.    20 min visit, over 50% face to face in counseling/discussion of hematospermia management.    Again, thank you for allowing me to participate in the care of your patient.      Sincerely,    Hal Butt MD

## 2019-02-14 NOTE — PROGRESS NOTES
Hal Woo is a 28 year old male who represents for hematospermia.    CC: bright red blood in ejaculate    HPI:     Subhash was last seen in urology clinic for this issue roughly 1 year ago (2/2018). At that time, he noted minor hematospermia in his ejaculate. CT 2/2018 showed minor calcification in his Left prostate, but otherwise normal. PSA was 1.1. He says he was found to have low vitamin D and his hematospermia resolved after taking vitamin D.      Today (2/2019):  - For the past 2-3 weeks, he has had bright red blood with ejaculation.   - 2/10 pain that he can't localize.  - Not sure if anything makes it better or worse.  - No new medication changes.   - No trauma to the area.   - No dysuria, hematuria or other voiding symptoms.    ROS: 12 point review of systems is negative unless otherwise noted in HPI    Past Medical History:   Diagnosis Date     Depression, major      Severe anxiety with panic 09/2014     Social History     Socioeconomic History     Marital status: Single     Spouse name: Not on file     Number of children: Not on file     Years of education: Not on file     Highest education level: Not on file   Social Needs     Financial resource strain: Not on file     Food insecurity - worry: Not on file     Food insecurity - inability: Not on file     Transportation needs - medical: Not on file     Transportation needs - non-medical: Not on file   Occupational History     Occupation:      Comment: [employer]   Tobacco Use     Smoking status: Never Smoker     Smokeless tobacco: Never Used   Substance and Sexual Activity     Alcohol use: Yes     Alcohol/week: 1.2 oz     Types: 2 Standard drinks or equivalent per week     Drug use: No     Sexual activity: No   Other Topics Concern     Parent/sibling w/ CABG, MI or angioplasty before 65F 55M? Not Asked   Social History Narrative    Lives alone.  No pets. Live is going generally OK.  Has been stressed over the past couple weeks.  "Work is boring but OK. 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        Has a good support system. Family is supportive.  Has friends but does not see them anymore. Difficult to make new friends.    Feels safe in all environments.    Wears seatbelt 100% of the time.    Wears helmet while biking.    Denies history of abuse, past or present, physical, sexual or emotional.    Alfreda Leon PA-C    02/06/18           Objective:  Blood pressure 109/65, pulse 65, height 1.676 m (5' 6\"), weight 73.6 kg (162 lb 4.8 oz).    Constitutional: normal mood and affect.   PSYCH: normal mood and affect.  NEURO: normal gait, no focal deficits.   EYES: anicteric, EOMI, PERR.  CARDIOPULMONARY: breathing non-labored, pulse regular rate/rhythm, no peripheral edema.  GI: Abdomen soft, non-tender, nondistended.  MUSCULOSKELETAL: normal limb proportions, no muscle wasting, no contractures.  Prostate exam: 20g, nontender, anodular today.    Labs:    CBC RESULTS:   Recent Labs   Lab Test 02/11/19  1010 02/06/18  0852   WBC  --  4.6   RBC  --  5.16   HGB 15.1 15.9   HCT 42.3 44.9   MCV 85.3 87   MCH 30.4 30.8   MCHC 35.7 35.4   RDW 13.7 13.2   PLT  --  205     UA RESULTS:  Recent Labs   Lab Test 02/11/19  1010 02/02/18  0849   COLOR  --  Straw   APPEARANCE  --  Clear   URINEGLC Negative Negative   URINEBILI  --  Negative   URINEKETONE Negative Negative   SG <=1.005 1.003   UBLD  --  Negative   URINEPH 7.0 7.0   PROTEIN  --  Negative   NITRITE Negative Negative   LEUKEST  --  Negative   RBCU  --  0   WBCU  --  0     INR   Date Value Ref Range Status   02/11/2019 1.12 0.86 - 1.14 Final      Imaging:  None    ASSESSMENT:     Hematospermia for 3 weeks. Non-tender prostate - likely benign etiology given negative workup a year ago.    PLAN:     No repeat CT scan advised at this time.    No labs today. CBC, UA, INR/PT 4 days ago were normal.    Pt not interested in empiric abx or cystoscopy at this time.    Follow up in 6 weeks if problem " persists.      Scribe Disclosure:   I, Kyle Owen, am serving as a scribe; to document services personally performed by Dr. Butt- -based on data collection and the provider's statements to me.     I, Hal Butt MD, saw and evaluated this patient personally.  I formulated the treatment plan with the patient.  The medical student acted as a scribe only for the progress note, and this represents my work and has been edited by me for content and accuracy.  Discussed that hematospermia is typically benign and self-limited.  No evidence of infection or anatomic abnormality.  Recommended observation in the short term.  If symptoms persist, consider cystoscopy and trans-rectal ultrasound of the prostate.    20 min visit, over 50% face to face in counseling/discussion of hematospermia management.

## 2019-02-14 NOTE — NURSING NOTE
Chief Complaint   Patient presents with     RECHECK     hematospermia coming in for follow up CT review       Tiffanie Castillo MA

## 2019-02-15 LAB
FACT VIII ACT/NOR PPP: 115 % (ref 55–200)
VWF CBA/VWF AG PPP IA-RTO: 137 % (ref 50–200)
VWF:AC ACT/NOR PPP IA: 128 % (ref 50–180)

## 2019-02-18 LAB
VON WILLEBRAND INTERPRETATION: NORMAL
VWF MULTIMERS PPP QL: NORMAL

## 2019-04-04 ENCOUNTER — OFFICE VISIT (OUTPATIENT)
Dept: FAMILY MEDICINE | Facility: CLINIC | Age: 29
End: 2019-04-04
Payer: COMMERCIAL

## 2019-04-04 VITALS
OXYGEN SATURATION: 100 % | SYSTOLIC BLOOD PRESSURE: 122 MMHG | WEIGHT: 172 LBS | HEIGHT: 66 IN | BODY MASS INDEX: 27.64 KG/M2 | TEMPERATURE: 97.9 F | DIASTOLIC BLOOD PRESSURE: 80 MMHG | HEART RATE: 54 BPM

## 2019-04-04 DIAGNOSIS — J00 ACUTE NASOPHARYNGITIS: Primary | ICD-10-CM

## 2019-04-04 DIAGNOSIS — J33.9 NASAL POLYP: ICD-10-CM

## 2019-04-04 DIAGNOSIS — J30.2 SEASONAL ALLERGIC RHINITIS, UNSPECIFIED TRIGGER: ICD-10-CM

## 2019-04-04 ASSESSMENT — ANXIETY QUESTIONNAIRES
7. FEELING AFRAID AS IF SOMETHING AWFUL MIGHT HAPPEN: MORE THAN HALF THE DAYS
IF YOU CHECKED OFF ANY PROBLEMS ON THIS QUESTIONNAIRE, HOW DIFFICULT HAVE THESE PROBLEMS MADE IT FOR YOU TO DO YOUR WORK, TAKE CARE OF THINGS AT HOME, OR GET ALONG WITH OTHER PEOPLE: SOMEWHAT DIFFICULT
3. WORRYING TOO MUCH ABOUT DIFFERENT THINGS: MORE THAN HALF THE DAYS
6. BECOMING EASILY ANNOYED OR IRRITABLE: SEVERAL DAYS
1. FEELING NERVOUS, ANXIOUS, OR ON EDGE: SEVERAL DAYS
GAD7 TOTAL SCORE: 11
2. NOT BEING ABLE TO STOP OR CONTROL WORRYING: SEVERAL DAYS
5. BEING SO RESTLESS THAT IT IS HARD TO SIT STILL: SEVERAL DAYS

## 2019-04-04 ASSESSMENT — PATIENT HEALTH QUESTIONNAIRE - PHQ9
5. POOR APPETITE OR OVEREATING: NEARLY EVERY DAY
SUM OF ALL RESPONSES TO PHQ QUESTIONS 1-9: 8

## 2019-04-04 ASSESSMENT — MIFFLIN-ST. JEOR: SCORE: 1682.69

## 2019-04-04 NOTE — PROGRESS NOTES
"  SUBJECTIVE:   Hal Woo is a 29 year old male who presents to clinic today for the following health issues:    Acute Illness   Acute illness concerns: Subhash presents with a 3 day history of sinus congestion, headache, and postnasal drip.  He reports he feels that he is sick once a month, he last saw Dr. Aleman 2/11/19 and was given Flonase nasal spray. Symptoms usually resolve without an antibiotic. He wonders if he is allergic to something in his apartment because recurrent nasal congestion began 2 years ago when he moved into his current apartment. He takes cetirizine 10 mg daily.    Onset: 4/1    Fever: no    Chills/Sweats: no    Headache (location?): YES- \"waves and tingles\"    Sinus Pressure:YES    Conjunctivitis:  no    Ear Pain: no    Rhinorrhea: YES- clear    Congestion: YES. Reports he constantly feels he has to blow his nose.     Sore Throat: YES- post nasal drip     Cough: YES    Wheeze: no    Decreased Appetite: no    Nausea: no    Vomiting: no    Diarrhea:  no    Dysuria/Freq.: no    Fatigue/Achiness: no    Sick/Strep Exposure: Unknown     Therapies Tried and outcome: Ibuprofen, Tylenol, NyQuil. May be helpful He has been using Flonase for the past 1.5 months since he saw Dr. Aleman    Problem list and histories reviewed & adjusted, as indicated.  Additional history: as documented    Patient Active Problem List   Diagnosis     Disturbance in sleep behavior     Generalized anxiety disorder     Insomnia     Panic disorder with agoraphobia     Vitamin D deficiency     Hx of psychiatric care     Moderate episode of recurrent major depressive disorder (H)     Vegan diet     Intrinsic eczema     Hematospermia     Past Surgical History:   Procedure Laterality Date     HC REMOVAL ADENOIDS,PRIMARY,<13 Y/O       HERNIA REPAIR      infancy     MANDIBLE SURGERY       NOSE SURGERY      deviated septum       Social History     Tobacco Use     Smoking status: Never Smoker     Smokeless tobacco: Never Used "   Substance Use Topics     Alcohol use: Yes     Alcohol/week: 1.2 oz     Types: 2 Standard drinks or equivalent per week     Family History   Problem Relation Age of Onset     Lung Cancer Maternal Grandmother      Lung Cancer Paternal Grandmother      Diabetes Father      Anxiety Disorder Father      Coronary Artery Disease Paternal Grandfather      Coronary Artery Disease Maternal Grandfather      Anxiety Disorder Brother      Obesity Brother      Depression Brother      Obesity Mother         Complications related to gastric bypass     Skin Cancer No family hx of      Melanoma No family hx of          Current Outpatient Medications   Medication Sig Dispense Refill     cetirizine (ZYRTEC) 10 MG tablet Take 10 mg by mouth       desvenlafaxine (PRISTIQ) 100 MG 24 hr tablet Take 1 tablet (100 mg) by mouth daily 90 tablet 0     fluocinonide (LIDEX) 0.05 % ointment Apply topically 2 times daily To affected areas of eczema (avoid face, groin and axilla), for approx 2 weeks 120 g 1     fluticasone (FLONASE) 50 MCG/ACT nasal spray Spray 1 spray into both nostrils daily 1 Bottle 3     LORazepam (ATIVAN) 0.5 MG tablet Take 0.5 tablets (0.25 mg) by mouth 2 times daily 30 tablet 0     triamcinolone (KENALOG) 0.1 % ointment Apply topically 2 times daily Avoid face, underarms, groin 80 g 3     vitamin B complex with vitamin C (VITAMIN  B COMPLEX) TABS tablet Take 1 tablet by mouth daily       Allergies   Allergen Reactions     Pineapple Difficulty breathing, Hives, Itching, Rash and Shortness Of Breath     Seasonal Allergies        Reviewed and updated as needed this visit by clinical staff  Tobacco  Allergies  Meds  Problems  Med Hx  Surg Hx  Fam Hx       Reviewed and updated as needed this visit by Provider  Tobacco  Allergies  Meds  Problems  Med Hx  Surg Hx  Fam Hx         ROS:  Constitutional, HEENT, cardiovascular, pulmonary, gi and gu systems are negative, except as otherwise noted.    OBJECTIVE:     BP  "122/80   Pulse 54   Temp 97.9  F (36.6  C) (Oral)   Ht 1.676 m (5' 5.98\")   Wt 78 kg (172 lb)   SpO2 100%   BMI 27.77 kg/m    Body mass index is 27.77 kg/m .  GENERAL: healthy, alert and no distress  EYES: Eyes grossly normal to inspection, PERRL and conjunctivae and sclerae normal  HENT: ear canals and TM's normal, Mid turbinate nasal polyp on the right with general erythema no purulent discharge. Mild ulceration noted on the right. Posterior pharynx without inflammation or exudate.   RESP: lungs clear to auscultation - no rales, rhonchi or wheezes  CV: regular rate and rhythm, normal S1 S2, no S3 or S4, no murmur, click or rub, no peripheral edema and peripheral pulses strong  Skin:  Erythematous macular rash noted in antecubital fossae No scale or excoriation  Psych:  well dressed and groomed.  Good eye contact and is cooperative. Thoughts linear.  No delusions, compulsions or paranoia.  Affect bright.  Patient denies homicidal and suicidal ideation as well as no thoughts or actions of self-harm.      ASSESSMENT/PLAN:       ICD-10-CM    1. Acute nasopharyngitis J00    2. Nasal polyp J33.9    3. Seasonal allergic rhinitis, unspecified trigger J30.2         Patient Instructions   Continue to use flonase daily but increase to 2 sprays once daily for the next 4 weeks. Consider the addition of a saline nasal rinse spray twice daily.  Always use bottled water and not tap water.    You may have some underlying allergies.  If you have persistent symptoms consider seeing ENT.    I don't think you need an antibiotic at this point and you likely have a viral infection.    Follow up if you have any worsening or persistent problems or concerns.    Leona Leon PA-C  [facility]    Vira MAN, am serving as a scribe to document services personally performed by Leona Leon PA-C, based on data collection and the provider's statements to me. Leona Leon PA-C, has reviewed, edited, " and approved the above note.

## 2019-04-04 NOTE — NURSING NOTE
"29 year old  Chief Complaint   Patient presents with     Cough     pt sick since Monday. pt reports he is getting sick every month with sinus problems.     Throat Problem     Sore throat . pt reports it thinks it may be from sinus drainage.     Sinus Problem     pt reports sinus pressure       Blood pressure 122/80, pulse 54, temperature 97.9  F (36.6  C), temperature source Oral, height 1.676 m (5' 5.98\"), weight 78 kg (172 lb), SpO2 100 %. Body mass index is 27.77 kg/m .  Patient Active Problem List   Diagnosis     Disturbance in sleep behavior     Generalized anxiety disorder     Insomnia     Panic disorder with agoraphobia     Vitamin D deficiency     Hx of psychiatric care     Moderate episode of recurrent major depressive disorder (H)     Vegan diet     Intrinsic eczema     Hematospermia       Wt Readings from Last 2 Encounters:   04/04/19 78 kg (172 lb)   02/14/19 73.6 kg (162 lb 4.8 oz)     BP Readings from Last 3 Encounters:   04/04/19 122/80   02/14/19 109/65   02/11/19 114/74         Current Outpatient Medications   Medication     cetirizine (ZYRTEC) 10 MG tablet     Cholecalciferol (VITAMIN D PO)     desvenlafaxine (PRISTIQ) 100 MG 24 hr tablet     fluocinonide (LIDEX) 0.05 % ointment     fluticasone (FLONASE) 50 MCG/ACT nasal spray     fluticasone (FLONASE) 50 MCG/ACT spray     LORazepam (ATIVAN) 0.5 MG tablet     triamcinolone (KENALOG) 0.1 % ointment     vitamin B complex with vitamin C (VITAMIN  B COMPLEX) TABS tablet     vitamin D (ERGOCALCIFEROL) 41625 UNIT capsule     No current facility-administered medications for this visit.        Social History     Tobacco Use     Smoking status: Never Smoker     Smokeless tobacco: Never Used   Substance Use Topics     Alcohol use: Yes     Alcohol/week: 1.2 oz     Types: 2 Standard drinks or equivalent per week     Drug use: No       Health Maintenance Due   Topic Date Due     DEPRESSION ACTION PLAN  04/03/2007     HIV SCREEN (SYSTEM ASSIGNED)  04/03/2007     " DTAP/TDAP/TD IMMUNIZATION (2 - Td) 10/08/2018     PREVENTIVE CARE VISIT  02/06/2019       No results found for: PAP      April 4, 2019 11:24 AM

## 2019-04-04 NOTE — PATIENT INSTRUCTIONS
Continue to use flonase daily but increase to 2 sprays once daily for the next 4 weeks. Consider the addition of a saline nasal rinse spray twice daily.  Always use bottled water and not tap water.    You may have some underlying allergies.  If you have persistent symptoms consider seeing ENT.    I don't think you need an antibiotic at this point and you likely have a viral infection.    Follow up if you have any worsening or persistent problems or concerns.      Patient Education     Understanding Nasal Polyps    Nasal polyps are abnormal, soft growths in the nose or sinuses. They are swollen bulbs of inflamed tissue, attached to the nasal lining by thin stalks. Nasal polyps are fairly common, especially as you get older.  How polyps affect the sinuses and nasal cavity  The sinuses are a group of air-filled spaces formed by the bones of your face. They connect with the nasal cavity. This is the large space behind your nose. Normally, all of these spaces are fairly open and air flows freely. But nasal polyps can grow and block the space. This can make it hard to breathe through your nose.  Nasal polyps are a result of ongoing (chronic) rhinosinusitis. This is a condition in which the nasal cavity and sinuses are inflamed for 12 weeks or longer. But not all people with this condition will develop nasal polyps.  What causes nasal polyps?  Researchers are still learning about the causes of nasal polyps. Inflammation of your nasal tissue is part of the cause. Nasal polyps are more common in people with health conditions such as:    Asthma    Aspirin sensitivity    Chronic sinus infections    Cystic fibrosis    Hay fever (allergic rhinitis)  Certain genes may also cause nasal polyps to grow. This includes genes that play a role in your immune system and inflammatory response. You may be more likely to develop nasal polyps if other members of your family have had them.  Symptoms of nasal polyps  If you have nasal polyps,  you may feel like you have a cold for months or longer. Some of your symptoms may be because of nasal polyps. Others may result from the chronic rhinosinusitis that caused your polyps.  The most common symptoms of nasal polyps include:    Nasal congestion    Runny nose    Feeling of fullness in  your facial sinus, but usually not pain    Postnasal drip    Reduced smell    Feeling blocked in your nose and having to breathe through your mouth  Diagnosing nasal polyps  Your healthcare provider will ask about your health history and symptoms. He or she will look inside your nose with a lighted tool. The polyps may be seen with this simple exam. Your healthcare provider may refer you to an ear, nose, and throat doctor (otolaryngologist).  Your healthcare provider might need more information about your sinuses and nasal cavity. He or she may want to diagnose the trigger of your polyps, such as allergies. You may need tests such as:    Nasal endoscopy, to look more closely at your inner nose and your sinuses. This is done with a thin, flexible tube with a light on the end. It s inserted into your nose to give a detailed view of your polyps.    CT scan, if the diagnosis isn t clear. X-rays pass through your nose and create images that are put together by a computer.    MRI, if needed. This uses strong magnets to create an image of tissues inside your body.    Allergy testing, to diagnose allergies    Tests to diagnose the airflow in the nasal cavity    Polyp biopsy, if needed to rule out cancer. A polyp or small piece of a polyp is removed and checked for cancer cells.  Date Last Reviewed: 11/1/2017 2000-2018 [employer]. [address] [place] [address], [place] [address]. All rights reserved. This information is not intended as a substitute for professional medical care. Always follow your healthcare professional's instructions.

## 2019-04-05 ASSESSMENT — ANXIETY QUESTIONNAIRES: GAD7 TOTAL SCORE: 11

## 2019-04-17 ENCOUNTER — OFFICE VISIT (OUTPATIENT)
Dept: PSYCHIATRY | Facility: CLINIC | Age: 29
End: 2019-04-17
Attending: PSYCHIATRY & NEUROLOGY
Payer: COMMERCIAL

## 2019-04-17 VITALS
HEART RATE: 64 BPM | DIASTOLIC BLOOD PRESSURE: 78 MMHG | BODY MASS INDEX: 26.84 KG/M2 | WEIGHT: 166.2 LBS | SYSTOLIC BLOOD PRESSURE: 129 MMHG

## 2019-04-17 DIAGNOSIS — F41.1 GAD (GENERALIZED ANXIETY DISORDER): ICD-10-CM

## 2019-04-17 PROCEDURE — G0463 HOSPITAL OUTPT CLINIC VISIT: HCPCS | Mod: ZF

## 2019-04-17 RX ORDER — DESVENLAFAXINE 100 MG/1
100 TABLET, EXTENDED RELEASE ORAL DAILY
Qty: 90 TABLET | Refills: 1 | Status: SHIPPED | OUTPATIENT
Start: 2019-04-17 | End: 2019-11-12

## 2019-04-17 ASSESSMENT — PAIN SCALES - GENERAL: PAINLEVEL: MILD PAIN (3)

## 2019-04-17 ASSESSMENT — PATIENT HEALTH QUESTIONNAIRE - PHQ9: SUM OF ALL RESPONSES TO PHQ QUESTIONS 1-9: 6

## 2019-04-17 NOTE — PROGRESS NOTES
"  PSYCHIATRY CLINIC PROGRESS NOTE   The initial diagnostic evaluation was on 2/13/17 and the last transfer of care evaluation was 7/11/17.    Pertinent Background:  This patient first experienced mental health issues in childhood where he saw a therapist in grade school and has received treatment for depression and anxiety.  See transfer evaluation for detailed history.  Notably, depression present in childhood along with a low level of anxiety, first noticed an increase in anxiety around May 2011 after graduation from college and moving in with parents for 2 months in the fall of that year. Oct 2011 he had first \"panic attack\" while at work in context of overuse of caffeine to help aid a hangover from a previous night of drinking EtOH. Since 2011 has had a high amount of \"fear and worry\" which is prominent when leaving his home and exacerbated by feeling like he is trapped and does not have an escape route. Hx of passive SI. Has experienced side effects (\"brain on fire\") to every medication tried aside from mirtazapine which was stopped 2/2 weight gain.      Psych critical item history includes suicidal ideation.    INTERIM HISTORY                                                 Hal Woo is a 29 year old male who was last seen in clinic on 1/16/19 at which time no changes were made.  The patient reports good treatment adherence. History was provided by the patient who was a good historian.  Since the last visit:  -has been doing well since last visit, continues to spend time exercising and working out, reports he is at one of his lowest weights and although weight loss is intentional is still eating healthy and enough food to fuel his body  -working towards finding employment, this has been a frustrating process, has expanded job search outside of [facility] and looking in areas such as [place] or [place], also considering staying in [place] and has applied for some local positions  -mood has been \"up and " "down\" but he feels in an acceptable range, not feeling too depressed or too anxious, and mood changes are appropriate to situational stressors  -denies having any thoughts of hurting self or others  -sleeping well, has some nights where sleep is more difficult but overall feels he is able to get enough sleep to feel rested  -has not needed any PRN lorazepam, last use has been over 9 months ago, still views it as somewhat of a safety net during this time of transition but continues to be open to discontinuing it in the future  -discussed resident transition out of clinic and options for ongoing management, he would like to have PCP take over prescribing his desvenlafaxine as he is feeling stable in his mood and understands he could return to clinic in future if needed    RECENT SYMPTOMS:   DEPRESSION:  reports-depressed mood, anhedonia, low energy, insomnia, weight changes and poor concentration /memory;  DENIES- suicidal ideation, excessive guilt and psychomotor changes [agitation or slowing]  DEVAUGHN/HYPOMANIA:  reports-none;  DENIES- increased energy, decreased sleep need, increased activity and grandiosity  PSYCHOSIS:  reports-none;  DENIES- delusions, auditory hallucinations and visual hallucinations  DYSREGULATION:  reports-none;  DENIES- suicidal ideation, violent ideation and SIB  PANIC ATTACK:  none   ANXIETY:  social anxiety and nervous/overwhelmed  TRAUMA RELATED:  none  COMPULSIVE:  none  ATTENTION:  none  EATING DISORDER: none    RECENT SUBSTANCE USE:     ALCOHOL- occasional (1 beer per week)  TOBACCO- none            CAFFEINE- has been drinking decaf recently, limits 2/2 anxiety       CANNABIS- none  OPIOIDS- none            NARCAN KIT- N/A       OTHER ILLICIT DRUGS- none     CURRENT SOCIAL HISTORY:  FINANCIAL SUPPORT- working       CHILDREN- none       LIVING SITUATION- lives alone in apartment      SOCIAL/ SPIRITUAL SUPPORT- friends, family, therapist      FEELS SAFE AT HOME- Yes     MEDICAL ROS:  " Reports sexual side effects (decreased drive and difficulty maintaining erection) Denies diarrhea, constipation, nausea, headaches, weight changes [increase, decrease], sedation, tremor, confusion, excessive diaphoresis, muscle twitches, bruxism, shiver and easy bruising, slowed reaction time and withdrawal symptoms.    PSYCH and CD Critical Summary Points since July 2017 July-August: Tried 2 trials of increasing sertraline to 37.5mg with increased anxiety and anger within 24 hours; obtained genesight testing  September: Attempted to start vilazodone-no covered by insurance and appeal denied; started desvenalfaxine 25mg   October: Increased desvenlafaxine to 50mg daily to further target anxiety and depression  November: Increased desvenlafaxine to 75mg daily  December: switched back to Telormedix  of Pristiq as he found other formulation less effective  February: Started slow taper of lorazepam  May: Increased desvenlafaxine to 100mg to further target anxiety and some depression symptoms  Michelle: discontinued all scheduled lorazepam, changed to PRN    PAST PSYCH MED TRIALS   see EMR Problem List: Hx of psychiatric care    MEDICAL / SURGICAL HISTORY                                   CARE TEAM:    PCP- Dr. Brittany Penaloza   Therapist- Sofía Ramirez, MSW MPH LICSW    Patient Active Problem List   Diagnosis     Disturbance in sleep behavior     Generalized anxiety disorder     Insomnia     Panic disorder with agoraphobia     Vitamin D deficiency     Hx of psychiatric care     Moderate episode of recurrent major depressive disorder (H)     Vegan diet     Intrinsic eczema     Hematospermia       ALLERGY                                Pineapple and Seasonal allergies  MEDICATIONS                               Current Outpatient Medications   Medication Sig Dispense Refill     cetirizine (ZYRTEC) 10 MG tablet Take 10 mg by mouth       desvenlafaxine (PRISTIQ) 100 MG 24 hr tablet Take 1 tablet (100 mg) by  mouth daily 90 tablet 0     fluocinonide (LIDEX) 0.05 % ointment Apply topically 2 times daily To affected areas of eczema (avoid face, groin and axilla), for approx 2 weeks 120 g 1     fluticasone (FLONASE) 50 MCG/ACT nasal spray Spray 1 spray into both nostrils daily 1 Bottle 3     LORazepam (ATIVAN) 0.5 MG tablet Take 0.5 tablets (0.25 mg) by mouth 2 times daily 30 tablet 0     triamcinolone (KENALOG) 0.1 % ointment Apply topically 2 times daily Avoid face, underarms, groin 80 g 3     vitamin B complex with vitamin C (VITAMIN  B COMPLEX) TABS tablet Take 1 tablet by mouth daily       VITALS   /78   Pulse 64   Wt 75.4 kg (166 lb 3.2 oz)   BMI 26.84 kg/m     MENTAL STATUS EXAM                                                           Alertness: alert  and oriented  Appearance: well groomed and appropriately dressed for weather  Behavior/Demeanor: cooperative and pleasant, with good  eye contact   Speech: normal  Language: intact  Psychomotor:  normal or unremarkable  Mood: description consistent with euthymia   Affect: full range; was congruent to mood; was congruent to content  Thought Process/Associations: unremarkable  Thought Content:  Reports none;  Denies suicidal ideation, violent ideation and delusions  Perception:  Reports none;  Denies auditory hallucinations and visual hallucinations  Insight: good   Judgment: good  Cognition: does  appear grossly intact; formal cognitive testing was not done    LABS and DATA     RATING SCALES:  none     PHQ9 TODAY = 6  PHQ-9 SCORE 1/16/2019 2/11/2019 4/4/2019   PHQ-9 Total Score 9 9 8     DIAGNOSIS     WILLIS with agoraphobia (improving)  Major Depression Disorder, single episode, mild    ASSESSMENT                                     TODAY Subhash reports continued overall stability in mood symptoms. Continues to have some anxiety and low mood at times but has been able to manage with other coping mechanisms and also able to process stressors within therapy  sessions. Does continue to have lorazepam available PRN should anxiety increase and he have episodes of panic but he has not had to use this since June. He continues to be open to discontinuing this PRN if stability in anxiety continues but patient requests to have it for emergencies at this time given many upcoming transitions (new job, potential move, etc). Tolerating his desvenlafaxine, with only noted side effect to be continued fluctuating decreased libido, he denies desire to change medications at this time. Has not had any further passive suicidal thoughts since last fall. Discussed resident transition out of clinic and Subhash would like to have his PCP manage his medications going forward, will reach out to PCP to ensure they are comfortable with this plan. Subhash understands he can follow up in this clinic with another resident in the future should he need to.    CONTROLLED SUBSTANCE STATEMENT:  The use of Lorazepam (Ativan) is indicated and appropriate.  This regimen is both beneficial and well tolerated with no adverse effects or tolerance.  There is no evidence of abuse of this medication or other substances.  Warnings related to abuse potential, street value, adverse effects, abrupt cessation, withdrawal and need for emergent care have been discussed and are understood by the patient.  The patient has verbalized clear understanding of safety issues as well as the need to control use.  Plan to continue use at this time.   Controlled Substance Contract was not completed.    [place] PRESCRIPTION MONITORING PROGRAM [] was not checked today:  indicates pt only receiving prescription from this clinic, no early refills or evidence of misuse/abuse    PSYCHOTROPIC DRUG INTERACTIONS:   None.  MANAGEMENT:  N/A     PLAN                                                                                                       1) PSYCHOTROPIC MEDICATIONS:  - continue desvenlafaxine 100mg daily (prescribe rachell  )   - continue lorazepam 0.25mg BID PRN (has not taken since June)    2) THERAPY:  Continue    3) NEXT DUE:    Labs- N/A  Rating Scales- N/A    4) REFERRALS:    NONE    5) RTC: N/A, plans to follow up with PCP    6) CRISIS NUMBERS:   Provided routinely in AVS.  Especially emphasized:  ONLY if a [facility]: [facility] [place] [phone] (clinic), [phone] (after hours)      TREATMENT RISK STATEMENT:  The risks, benefits, alternatives and potential adverse effects have been discussed and are understood by the patient/ patient's guardian. The pt understands the risks of using street drugs or alcohol.  There are no medical contraindications, the pt agrees to treatment with the ability to do so.  The patient understands to call 911 or come to the nearest ED if life threatening or urgent symptoms present.    PSYCHIATRY CLINIC INDIVIDUAL PSYCHOTHERAPY NOTE                                    16   Start time: 0805             End time: 0825  Date revised: 11/7/18     Date next due: N/A  Subjective: This supportive psychotherapy session addressed issues related to goals of therapy and current stressors including difficulties with  looking for potential new employment. Patient's reaction: Preparatory in the context of mental status appropriate for ambulatory setting.  Progress: fair  Plan: Patient will transition to PCP for ongoing management  Psychotherapy services during this visit included myself and Subhash.   TREATMENT  PLAN          SYMPTOMS;PROBLEMS   MEASURABLE GOALS;    FUNCTIONAL IMPROVEMENT INTERVENTIONS;    GAINS MADE DISCHARGE CRITERIA   Anxiety: nervous/tense/restless/overwhelmed   Utilize mindfulness  -attend mindfulness/meditation classes 1x per month to continue practice and be intentional about incorporating mindfulness daily (has not been attending classes, finding it more difficult recently to incorporate daily mindfulness but working to be more intentional about this) marked symptom  improvement   Depression: depressed mood   improve/ maintain good physical health practices as a coping skill by exercising at least every other day and restart yoga practice in the New Year -has been exercising at least 5x per week; has not gone to Yoga recently marked symptom improvement     RESIDENT:   She Rivera, DO    Patient not staffed in clinic, note will be reviewed and signed by supervisor Dr. Brown.  I did not see this pt directly. I have reviewed the documentation, and I agree with the resident's plan of care.    Rody Brown

## 2019-11-12 ENCOUNTER — OFFICE VISIT (OUTPATIENT)
Dept: FAMILY MEDICINE | Facility: CLINIC | Age: 29
End: 2019-11-12
Payer: COMMERCIAL

## 2019-11-12 VITALS
OXYGEN SATURATION: 100 % | SYSTOLIC BLOOD PRESSURE: 116 MMHG | HEIGHT: 66 IN | BODY MASS INDEX: 26.03 KG/M2 | WEIGHT: 162 LBS | DIASTOLIC BLOOD PRESSURE: 80 MMHG | TEMPERATURE: 98.3 F | RESPIRATION RATE: 16 BRPM | HEART RATE: 69 BPM

## 2019-11-12 DIAGNOSIS — F33.1 MODERATE EPISODE OF RECURRENT MAJOR DEPRESSIVE DISORDER (H): ICD-10-CM

## 2019-11-12 DIAGNOSIS — F41.1 GAD (GENERALIZED ANXIETY DISORDER): Primary | ICD-10-CM

## 2019-11-12 RX ORDER — DESVENLAFAXINE 100 MG/1
100 TABLET, EXTENDED RELEASE ORAL DAILY
Qty: 90 TABLET | Refills: 3 | Status: SHIPPED | OUTPATIENT
Start: 2019-11-12 | End: 2020-10-14

## 2019-11-12 SDOH — ECONOMIC STABILITY: INCOME INSECURITY: HOW HARD IS IT FOR YOU TO PAY FOR THE VERY BASICS LIKE FOOD, HOUSING, MEDICAL CARE, AND HEATING?: NOT HARD AT ALL

## 2019-11-12 SDOH — HEALTH STABILITY: MENTAL HEALTH: HOW OFTEN DO YOU HAVE A DRINK CONTAINING ALCOHOL?: 2-4 TIMES A MONTH

## 2019-11-12 SDOH — ECONOMIC STABILITY: FOOD INSECURITY: WITHIN THE PAST 12 MONTHS, YOU WORRIED THAT YOUR FOOD WOULD RUN OUT BEFORE YOU GOT MONEY TO BUY MORE.: NEVER TRUE

## 2019-11-12 SDOH — ECONOMIC STABILITY: TRANSPORTATION INSECURITY
IN THE PAST 12 MONTHS, HAS THE LACK OF TRANSPORTATION KEPT YOU FROM MEDICAL APPOINTMENTS OR FROM GETTING MEDICATIONS?: NO

## 2019-11-12 SDOH — HEALTH STABILITY: MENTAL HEALTH: HOW OFTEN DO YOU HAVE 6 OR MORE DRINKS ON ONE OCCASION?: NEVER

## 2019-11-12 SDOH — HEALTH STABILITY: MENTAL HEALTH: HOW MANY STANDARD DRINKS CONTAINING ALCOHOL DO YOU HAVE ON A TYPICAL DAY?: 1 OR 2

## 2019-11-12 SDOH — HEALTH STABILITY: PHYSICAL HEALTH: ON AVERAGE, HOW MANY MINUTES DO YOU ENGAGE IN EXERCISE AT THIS LEVEL?: 60 MIN

## 2019-11-12 SDOH — ECONOMIC STABILITY: TRANSPORTATION INSECURITY
IN THE PAST 12 MONTHS, HAS LACK OF TRANSPORTATION KEPT YOU FROM MEETINGS, WORK, OR FROM GETTING THINGS NEEDED FOR DAILY LIVING?: NO

## 2019-11-12 SDOH — HEALTH STABILITY: MENTAL HEALTH
STRESS IS WHEN SOMEONE FEELS TENSE, NERVOUS, ANXIOUS, OR CAN'T SLEEP AT NIGHT BECAUSE THEIR MIND IS TROUBLED. HOW STRESSED ARE YOU?: RATHER MUCH

## 2019-11-12 SDOH — HEALTH STABILITY: PHYSICAL HEALTH: ON AVERAGE, HOW MANY DAYS PER WEEK DO YOU ENGAGE IN MODERATE TO STRENUOUS EXERCISE (LIKE A BRISK WALK)?: 6 DAYS

## 2019-11-12 SDOH — ECONOMIC STABILITY: FOOD INSECURITY: WITHIN THE PAST 12 MONTHS, THE FOOD YOU BOUGHT JUST DIDN'T LAST AND YOU DIDN'T HAVE MONEY TO GET MORE.: NEVER TRUE

## 2019-11-12 ASSESSMENT — MIFFLIN-ST. JEOR: SCORE: 1637.33

## 2019-11-12 NOTE — PROGRESS NOTES
SUBJECTIVE:   Hal Woo is a 29 year old male who presents to clinic today for the following health issues:    Depression and Anxiety Follow-Up:  Hal is taking Pristiq 100 mg daily, with very limited use of Ativan 0.25 mg for panic attack control.  He is doing well, and was interested in discussing lowering his dose.  However, on his way here, he was accepted to become an , teaching business analytics, in the spring.  This made him second guess adjusting his dose, as he believes he will have increased anxiety about talking in front of students.  He was interested in options for PRN medications that could be used prior to public speaking.  His sleep has been variable, some nights he sleeps well and others he does not.  He is not interested in starting any sleep medication.  He exercises frequently.  Counseling: He is in counseling, but has not had a chance to discuss his  job with his counselor yet.    How are you doing with your depression since your last visit? Improved    How are you doing with your anxiety since your last visit?  Improved    Are you having other symptoms that might be associated with depression or anxiety? No    Have you had a significant life event? Job Concerns - he recently got accepted to be an  in the spring.    Do you have any concerns with your use of alcohol or other drugs? No    Social History     Tobacco Use     Smoking status: Never Smoker     Smokeless tobacco: Never Used   Substance Use Topics     Alcohol use: Yes     Alcohol/week: 2.0 standard drinks     Types: 2 Standard drinks or equivalent per week     Frequency: 2-4 times a month     Drinks per session: 1 or 2     Binge frequency: Never     Drug use: No     PHQ 4/4/2019 4/17/2019 11/14/2019   PHQ-9 Total Score 8 6 8   Q9: Thoughts of better off dead/self-harm past 2 weeks Not at all Not at all Not at all   F/U: Thoughts of suicide or self-harm - - -   F/U: Safety  concerns - - -     WILLIS-7 SCORE 2/11/2019 4/4/2019 11/14/2019   Total Score 11 11 9       Problem list and histories reviewed & adjusted, as indicated.  Additional history: as documented    Patient Active Problem List   Diagnosis     Disturbance in sleep behavior     Generalized anxiety disorder     Insomnia     Panic disorder with agoraphobia     Vitamin D deficiency     Hx of psychiatric care     Moderate episode of recurrent major depressive disorder (H)     Vegan diet     Intrinsic eczema     Hematospermia     Past Surgical History:   Procedure Laterality Date     HC REMOVAL ADENOIDS,PRIMARY,<11 Y/O       HERNIA REPAIR      infancy     MANDIBLE SURGERY       NOSE SURGERY      deviated septum       Social History     Tobacco Use     Smoking status: Never Smoker     Smokeless tobacco: Never Used   Substance Use Topics     Alcohol use: Yes     Alcohol/week: 2.0 standard drinks     Types: 2 Standard drinks or equivalent per week     Frequency: 2-4 times a month     Drinks per session: 1 or 2     Binge frequency: Never     Family History   Problem Relation Age of Onset     Lung Cancer Maternal Grandmother      Lung Cancer Paternal Grandmother      Diabetes Father      Anxiety Disorder Father      Coronary Artery Disease Paternal Grandfather      Coronary Artery Disease Maternal Grandfather      Anxiety Disorder Brother      Obesity Brother      Depression Brother      Obesity Mother         Complications related to gastric bypass     Skin Cancer No family hx of      Melanoma No family hx of          Current Outpatient Medications   Medication Sig Dispense Refill     desvenlafaxine (PRISTIQ) 100 MG 24 hr tablet Take 1 tablet (100 mg) by mouth daily 90 tablet 3     fluocinonide (LIDEX) 0.05 % ointment Apply topically 2 times daily To affected areas of eczema (avoid face, groin and axilla), for approx 2 weeks 120 g 1     triamcinolone (KENALOG) 0.1 % ointment Apply topically 2 times daily Avoid face, underarms, groin 80  "g 3     cetirizine (ZYRTEC) 10 MG tablet Take 10 mg by mouth       fluticasone (FLONASE) 50 MCG/ACT nasal spray Spray 1 spray into both nostrils daily (Patient not taking: Reported on 11/12/2019) 1 Bottle 3     Allergies   Allergen Reactions     Pineapple Difficulty breathing, Hives, Itching, Rash and Shortness Of Breath     Seasonal Allergies        Reviewed and updated as needed this visit by clinical staff  Tobacco  Allergies  Meds  Problems  Med Hx  Surg Hx  Fam Hx  Soc Hx        Reviewed and updated as needed this visit by Provider  Tobacco  Allergies  Meds  Problems  Med Hx  Surg Hx  Fam Hx  Soc Hx          ROS:  Constitutional, HEENT, cardiovascular, pulmonary, gi and gu systems are negative, except as otherwise noted.      OBJECTIVE:     /80   Pulse 69   Temp 98.3  F (36.8  C) (Oral)   Resp 16   Ht 1.676 m (5' 5.98\")   Wt 73.5 kg (162 lb)   SpO2 100%   BMI 26.16 kg/m    Body mass index is 26.16 kg/m .  GENERAL: healthy, alert and no distress  RESP: lungs clear to auscultation - no rales, rhonchi or wheezes  CV: regular rate and rhythm, normal S1 S2, no S3 or S4, no murmur, click or rub, no peripheral edema and peripheral pulses strong  PSYCH: well dressed and groomed.  Good eye contact and is cooperative. Thoughts linear.  No delusions, compulsions or paranoia.  Affect anxious but hopeful.  Patient denies homicidal and suicidal ideation as well as no thoughts or actions of self-harm.    ASSESSMENT/PLAN:       ICD-10-CM    1. WILLIS (generalized anxiety disorder) F41.1 desvenlafaxine (PRISTIQ) 100 MG 24 hr tablet   2. Moderate episode of recurrent major depressive disorder (H) F33.1      Following discussion we made a mutuals decision to continue pristiq and the current dose. Overall he is doing very well.  He will continue with counseling.  Exercise is a great stress reliever and he will continue with that.  Follow up in 3 months to monitor how things are going with the new " position.    Leona Leon PA-C  [facility]    I, Vinicio Arias, am serving as a scribe to document services personally performed by Leona Leon PA-C, based on data collection and the provider's statements to me. Leona Leon PA-C, has reviewed, edited, and approve the above note.

## 2019-11-12 NOTE — NURSING NOTE
"29 year old  Chief Complaint   Patient presents with     Recheck Medication     Prestiq refill       Blood pressure 116/80, pulse 69, temperature 98.3  F (36.8  C), temperature source Oral, resp. rate 16, height 1.676 m (5' 5.98\"), weight 73.5 kg (162 lb), SpO2 100 %. Body mass index is 26.16 kg/m .  Patient Active Problem List   Diagnosis     Disturbance in sleep behavior     Generalized anxiety disorder     Insomnia     Panic disorder with agoraphobia     Vitamin D deficiency     Hx of psychiatric care     Moderate episode of recurrent major depressive disorder (H)     Vegan diet     Intrinsic eczema     Hematospermia       Wt Readings from Last 2 Encounters:   11/12/19 73.5 kg (162 lb)   04/04/19 78 kg (172 lb)     BP Readings from Last 3 Encounters:   11/12/19 116/80   04/04/19 122/80   02/14/19 109/65         Current Outpatient Medications   Medication     desvenlafaxine (PRISTIQ) 100 MG 24 hr tablet     fluocinonide (LIDEX) 0.05 % ointment     LORazepam (ATIVAN) 0.5 MG tablet     triamcinolone (KENALOG) 0.1 % ointment     cetirizine (ZYRTEC) 10 MG tablet     fluticasone (FLONASE) 50 MCG/ACT nasal spray     vitamin B complex with vitamin C (VITAMIN  B COMPLEX) TABS tablet     No current facility-administered medications for this visit.        Social History     Tobacco Use     Smoking status: Never Smoker     Smokeless tobacco: Never Used   Substance Use Topics     Alcohol use: Yes     Alcohol/week: 2.0 standard drinks     Types: 2 Standard drinks or equivalent per week     Drug use: No       Health Maintenance Due   Topic Date Due     DEPRESSION ACTION PLAN  1989     HIV SCREENING  04/03/2004     PREVENTIVE CARE VISIT  02/06/2019     INFLUENZA VACCINE (1) 09/01/2019     PHQ-9  10/17/2019       No results found for: PAP      November 12, 2019 1:14 PM  "

## 2019-11-12 NOTE — PATIENT INSTRUCTIONS
Wt Readings from Last 10 Encounters:   11/12/19 73.5 kg (162 lb)   04/17/19 75.4 kg (166 lb 3.2 oz)   04/04/19 78 kg (172 lb)   02/14/19 73.6 kg (162 lb 4.8 oz)   02/11/19 77.9 kg (171 lb 12 oz)   01/16/19 77.5 kg (170 lb 12.8 oz)   11/07/18 84.3 kg (185 lb 12.8 oz)   08/02/18 86.4 kg (190 lb 6.4 oz)   06/26/18 85.5 kg (188 lb 6.4 oz)   05/22/18 95.3 kg (210 lb)

## 2019-11-14 ASSESSMENT — PATIENT HEALTH QUESTIONNAIRE - PHQ9
SUM OF ALL RESPONSES TO PHQ QUESTIONS 1-9: 8
5. POOR APPETITE OR OVEREATING: NEARLY EVERY DAY

## 2019-11-14 ASSESSMENT — ANXIETY QUESTIONNAIRES
2. NOT BEING ABLE TO STOP OR CONTROL WORRYING: NOT AT ALL
3. WORRYING TOO MUCH ABOUT DIFFERENT THINGS: MORE THAN HALF THE DAYS
7. FEELING AFRAID AS IF SOMETHING AWFUL MIGHT HAPPEN: SEVERAL DAYS
IF YOU CHECKED OFF ANY PROBLEMS ON THIS QUESTIONNAIRE, HOW DIFFICULT HAVE THESE PROBLEMS MADE IT FOR YOU TO DO YOUR WORK, TAKE CARE OF THINGS AT HOME, OR GET ALONG WITH OTHER PEOPLE: NOT DIFFICULT AT ALL
5. BEING SO RESTLESS THAT IT IS HARD TO SIT STILL: SEVERAL DAYS
6. BECOMING EASILY ANNOYED OR IRRITABLE: SEVERAL DAYS
GAD7 TOTAL SCORE: 9
1. FEELING NERVOUS, ANXIOUS, OR ON EDGE: SEVERAL DAYS

## 2019-11-15 ASSESSMENT — ANXIETY QUESTIONNAIRES: GAD7 TOTAL SCORE: 9

## 2019-11-26 ENCOUNTER — OFFICE VISIT (OUTPATIENT)
Dept: FAMILY MEDICINE | Facility: CLINIC | Age: 29
End: 2019-11-26
Payer: COMMERCIAL

## 2019-11-26 VITALS
DIASTOLIC BLOOD PRESSURE: 83 MMHG | SYSTOLIC BLOOD PRESSURE: 136 MMHG | HEART RATE: 78 BPM | TEMPERATURE: 98.4 F | RESPIRATION RATE: 16 BRPM | OXYGEN SATURATION: 100 %

## 2019-11-26 DIAGNOSIS — R07.0 THROAT PAIN: Primary | ICD-10-CM

## 2019-11-26 LAB — S PYO AG THROAT QL IA.RAPID: NEGATIVE

## 2019-11-26 RX ORDER — AMOXICILLIN 875 MG
875 TABLET ORAL 2 TIMES DAILY
Qty: 14 TABLET | Refills: 0 | Status: SHIPPED | OUTPATIENT
Start: 2019-11-26 | End: 2020-10-14

## 2019-11-26 NOTE — PATIENT INSTRUCTIONS

## 2019-11-26 NOTE — PROGRESS NOTES
SUBJECTIVE:   Hal Woo is a 29 year old male who presents to clinic today to discuss the following problem(s).    Throat pain  - been going on/getting worse for the past 2 days  - for the past 2 weeks been feeling sick with a head cold  - was feeling sick, then started getting better, then worse again, then just went to [place] and was running around, hiking, in the cold, now feels worse again.  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 NEGATIVE for chills, fever, sweats   EYES: NEGATIVE for vision changes  ENT/MOUTH: Sore throat, nasal congestion, postnasal drainage and rhinorrhea  RESP: Cough. NEGATIVE for hemoptysis, SOB/dyspnea and wheezing  CV: NEGATIVE for chest pain/chest pressure, dyspnea on exertion  GI: NEGATIVE for abdominal pain, diarrhea, dysphagia and nausea  MUSCULOSKELETAL: Mild body aches.  INTEGUMENTARY/SKIN: NEGATIVE for new lesions and pruritis   NEURO: NEGATIVE for dizziness/lightheadedness   HEME/ALLERGY/IMMUNE: NEGATIVE for swollen nodes    Past Medical History:   Diagnosis Date     Depression, major      Severe anxiety with panic 09/2014     Past Surgical History:   Procedure Laterality Date     HC REMOVAL ADENOIDS,PRIMARY,<11 Y/O       HERNIA REPAIR      infancy     MANDIBLE SURGERY       NOSE SURGERY      deviated septum     Family History   Problem Relation Age of Onset     Lung Cancer Maternal Grandmother      Lung Cancer Paternal Grandmother      Diabetes Father      Anxiety Disorder Father      Coronary Artery Disease Paternal Grandfather      Coronary Artery Disease Maternal Grandfather      Anxiety Disorder Brother      Obesity Brother      Depression Brother      Obesity Mother         Complications related to gastric bypass     Skin Cancer No family hx of      Melanoma No family hx of       Social History     Tobacco Use     Smoking status: Never Smoker     Smokeless tobacco: Never Used   Substance Use Topics     Alcohol use: Yes     Alcohol/week: 2.0 standard drinks     Types: 2 Standard drinks or equivalent per week     Frequency: 2-4 times a month     Drinks per session: 1 or 2     Binge frequency: Never     Drug use: No     Social History     Social History Narrative    Lives alone.  No pets. Live is going generally OK.  Has been stressed over the past couple weeks. Work is boring but OK. Is going to school getting his [employer] at the [employer].        Has a good support system. Family is supportive.  Has friends but does not see them anymore. Difficult to make new friends.    Feels safe in all environments.    Wears seatbelt 100% of the time.    Wears helmet while biking.    Denies history of abuse, past or present, physical, sexual or emotional.    Alfreda Leon PA-C    02/06/18           Current Outpatient Medications   Medication     cetirizine (ZYRTEC) 10 MG tablet     desvenlafaxine (PRISTIQ) 100 MG 24 hr tablet     fluocinonide (LIDEX) 0.05 % ointment     fluticasone (FLONASE) 50 MCG/ACT nasal spray     triamcinolone (KENALOG) 0.1 % ointment     No current facility-administered medications for this visit.      I have reviewed the patient's past medical, surgical, family, and social history.     OBJECTIVE:   /83   Pulse 78   Temp 98.4  F (36.9  C) (Oral)   Resp 16   SpO2 100%     Constitutional: well-appearing, appears stated age  Eyes: conjunctivae without erythema, sclera anicteric.   ENT: audible congestion, tonsilar erythema with posterior drainage  Cardiac: regular rate and rhythm, normal S1/S2, no murmur/rubs/gallops  Respiratory: cough. Lungs clear to auscultation bilaterally, normal work of breathing, no wheezes/crackles  Skin: no rashes, lesions, or wounds  Psych: affect is full and appropriate, speech is fluent and non-pressured    ASSESSMENT AND PLAN:     Hal was seen today  for throat problem and cough.    Diagnoses and all orders for this visit:    Throat pain  -     Rapid Strep Screen (Group) ([place])  -     amoxicillin (AMOXIL) 875 MG tablet; Take 1 tablet (875 mg) by mouth 2 times daily  -     Throat Culture Aerobic Bacterial    Suspect persistent viral infection. Rapid strep negative, will send for culture. Given the holiday weekend I have agreed to write an insurance prescription for amoxicillin as noted above is symptoms should worsen significantly. Will contact patient with results of culture as available. Discussed symptom management and advised patient of concerning symptoms indicating a need for further medical advice as noted in patient instructions.         Tye Fair MD  [facility]  11/26/2019, 9:00 AM

## 2019-11-28 LAB
BACTERIA SPEC CULT: NORMAL
Lab: NORMAL
SPECIMEN SOURCE: NORMAL

## 2019-12-11 ENCOUNTER — OFFICE VISIT (OUTPATIENT)
Dept: FAMILY MEDICINE | Facility: CLINIC | Age: 29
End: 2019-12-11
Payer: COMMERCIAL

## 2019-12-11 VITALS
TEMPERATURE: 97.5 F | OXYGEN SATURATION: 100 % | DIASTOLIC BLOOD PRESSURE: 79 MMHG | HEART RATE: 52 BPM | WEIGHT: 171 LBS | HEIGHT: 67 IN | SYSTOLIC BLOOD PRESSURE: 123 MMHG | BODY MASS INDEX: 26.84 KG/M2

## 2019-12-11 DIAGNOSIS — J06.9 UPPER RESPIRATORY TRACT INFECTION, UNSPECIFIED TYPE: Primary | ICD-10-CM

## 2019-12-11 LAB
% GRANULOCYTES: 53.7 %G (ref 40–75)
ERYTHROCYTE [DISTWIDTH] IN BLOOD BY AUTOMATED COUNT: 13.1 %
GRANULOCYTES #: 2.9 K/UL (ref 1.6–8.3)
HCT VFR BLD AUTO: 38 % (ref 40–53)
HEMOGLOBIN: 13.4 G/DL (ref 13.3–17.7)
LYMPHOCYTES # BLD AUTO: 2.4 K/UL (ref 0.8–5.3)
LYMPHOCYTES NFR BLD AUTO: 43.1 %L (ref 20–48)
MCH RBC QN AUTO: 30.7 PG (ref 26.5–35)
MCHC RBC AUTO-ENTMCNC: 35.3 G/DL (ref 32–36)
MCV RBC AUTO: 87.2 FL (ref 78–100)
MID #: 0.2 K/UL (ref 0–2.2)
MID %: 3.2 %M (ref 0–20)
PLATELET # BLD AUTO: 202 K/UL (ref 150–450)
RBC # BLD AUTO: 4.36 M/UL (ref 4.4–5.9)
WBC # BLD AUTO: 5.5 K/UL (ref 4–11)

## 2019-12-11 RX ORDER — AZITHROMYCIN 250 MG/1
TABLET, FILM COATED ORAL
Qty: 6 TABLET | Refills: 0 | Status: SHIPPED | OUTPATIENT
Start: 2019-12-11 | End: 2020-10-14

## 2019-12-11 ASSESSMENT — MIFFLIN-ST. JEOR: SCORE: 1686.89

## 2019-12-11 NOTE — NURSING NOTE
"29 year old  Chief Complaint   Patient presents with     Sinusitis     sinus pressure,  headache,  sore throat,  post nasal drainage,  fatigue and cough  x  1 mons ongoing          Blood pressure 123/79, pulse 52, temperature 97.5  F (36.4  C), temperature source Oral, height 1.69 m (5' 6.54\"), weight 77.6 kg (171 lb), SpO2 100 %. Body mass index is 27.16 kg/m .  Patient Active Problem List   Diagnosis     Disturbance in sleep behavior     Generalized anxiety disorder     Insomnia     Panic disorder with agoraphobia     Vitamin D deficiency      of psychiatric care     Moderate episode of recurrent major depressive disorder (H)     Vegan diet     Intrinsic eczema     Hematospermia       Wt Readings from Last 2 Encounters:   12/11/19 77.6 kg (171 lb)   11/12/19 73.5 kg (162 lb)     BP Readings from Last 3 Encounters:   12/11/19 123/79   11/26/19 136/83   11/12/19 116/80         Current Outpatient Medications   Medication     desvenlafaxine (PRISTIQ) 100 MG 24 hr tablet     fluocinonide (LIDEX) 0.05 % ointment     fluticasone (FLONASE) 50 MCG/ACT nasal spray     triamcinolone (KENALOG) 0.1 % ointment     amoxicillin (AMOXIL) 875 MG tablet     cetirizine (ZYRTEC) 10 MG tablet     No current facility-administered medications for this visit.        Social History     Tobacco Use     Smoking status: Never Smoker     Smokeless tobacco: Never Used   Substance Use Topics     Alcohol use: Yes     Alcohol/week: 2.0 standard drinks     Types: 2 Standard drinks or equivalent per week     Frequency: 2-4 times a month     Drinks per session: 1 or 2     Binge frequency: Never     Drug use: No       Health Maintenance Due   Topic Date Due     HIV SCREENING  04/03/2004     PREVENTIVE CARE VISIT  02/06/2019       No results found for: PAP      December 11, 2019 4:13 PM    "

## 2019-12-11 NOTE — PROGRESS NOTES
"Hal Woo is a 29 year old male here for the following issues:    Upper respiratory tract infection, unspecified type   Subhash visited [facility] on 11/26/2019 with throat pain, sinus pressure, headaches and fatigue for a few weeks.  He was started on amoxicillin for a week. Today, he has continued to have these sx, which have now been present for about a month.  The amoxicillin seemed to help, but he has ran out and feels he has been worsening.  He has been trying to rest.  His cough has been productive, but also dry and hacky at times.  His throat has continued to be sore, and his fatigue has worsened significantly in the past day.  He also has bilateral sinus pressure, and a headache that he feels is caused by his sinus pressure.  He has not had to miss work since his last visit.  He has been able to sleep well, using Nyquil. He has been taking ibuprofen and throat drops.  Nonsmoker.  Denies chest or ear pain.    He reports he worked out at the gym yesterday then felt \"much worse\". Appetite is dampened, no nausea or vomiting.       Patient Active Problem List   Diagnosis     Disturbance in sleep behavior     Generalized anxiety disorder     Insomnia     Panic disorder with agoraphobia     Vitamin D deficiency     Hx of psychiatric care     Moderate episode of recurrent major depressive disorder (H)     Vegan diet     Intrinsic eczema     Hematospermia       Current Outpatient Medications   Medication Sig Dispense Refill     desvenlafaxine (PRISTIQ) 100 MG 24 hr tablet Take 1 tablet (100 mg) by mouth daily 90 tablet 3     amoxicillin (AMOXIL) 875 MG tablet Take 1 tablet (875 mg) by mouth 2 times daily 14 tablet 0     cetirizine (ZYRTEC) 10 MG tablet Take 10 mg by mouth       fluocinonide (LIDEX) 0.05 % ointment Apply topically 2 times daily To affected areas of eczema (avoid face, groin and axilla), for approx 2 weeks (Patient not taking: Reported on 12/11/2019) 120 g 1     fluticasone (FLONASE) 50 MCG/ACT nasal " "spray Spray 1 spray into both nostrils daily (Patient not taking: Reported on 12/11/2019) 1 Bottle 3     triamcinolone (KENALOG) 0.1 % ointment Apply topically 2 times daily Avoid face, underarms, groin (Patient not taking: Reported on 12/11/2019) 80 g 3       Allergies   Allergen Reactions     Pineapple Difficulty breathing, Hives, Itching, Rash and Shortness Of Breath     Seasonal Allergies         EXAM  /79 (BP Location: Left arm, Patient Position: Sitting, Cuff Size: Adult Regular)   Pulse 52   Temp 97.5  F (36.4  C) (Oral)   Ht 1.69 m (5' 6.54\")   Wt 77.6 kg (171 lb)   SpO2 100%   BMI 27.16 kg/m    Gen: Alert, pleasant, somewhat nervous  HEENT:  Conjunctiva nl, TM normal bilaterally, OP clear, minimal posterior erythema - cryptic tonsils bilaterally, no exudate  Neck: No lymphadenopathy  COR: S1,S2, no murmur  Lungs: CTA bilaterally, no rhonchi, wheezes or rales but dry hacking cough with inspiration      Assessment:  (J06.9) Upper respiratory tract infection, unspecified type  (primary encounter diagnosis)  Comment: suspect this is a viral illness  Plan: CBC with Diff Plt ([facility]), azithromycin         (ZITHROMAX) 250 MG tablet          Patient Instructions   Ibuprofen as needed for headaches    Sudafed (pseudoephedrine) short acting tablets =30mg  Take 2 (60mg)  in the morning, then repeat in the afternoon    Flonase nasal spray, spray horizontally daily    Wait and see Rx for Azithromycin, if not improving over 3-4 days of rest (no exercise)        Brittany Penaloza MD  Internal Medicine/Pediatrics      I, Vinicio Arias, am serving as a scribe to document services personally performed by Dr. Brittany Penaloza, based on data collection and the provider's statements to me. Dr. Penaloza has reviewed, edited, and approved the above note.     "

## 2019-12-11 NOTE — PATIENT INSTRUCTIONS
Ibuprofen as needed for headaches    Sudafed (pseudoephedrine) short acting tablets =30mg  Take 2 (60mg)  in the morning, then repeat in the afternoon    Flonase nasal spray, spray horizontally daily

## 2020-03-10 ENCOUNTER — HEALTH MAINTENANCE LETTER (OUTPATIENT)
Age: 30
End: 2020-03-10

## 2020-07-13 ENCOUNTER — VIRTUAL VISIT (OUTPATIENT)
Dept: FAMILY MEDICINE | Facility: OTHER | Age: 30
End: 2020-07-13
Payer: COMMERCIAL

## 2020-07-13 PROCEDURE — 99421 OL DIG E/M SVC 5-10 MIN: CPT | Performed by: FAMILY MEDICINE

## 2020-07-14 NOTE — PROGRESS NOTES
"Date: 2020 19:48:29  Clinician: Tye Fair  Clinician NPI: [id number]  Patient: Hal Woo  Patient : 1990  Patient Address: [address] [place] [address]  Patient Phone: [phone]  Visit Protocol: URI  Patient Summary:  Hal is a 30 year old ( : 1990 ) male who initiated a Visit for COVID-19 (Coronavirus) evaluation and screening. When asked the question \"Please sign me up to receive news, health information and promotions from [employer].\", Hal responded \"No\".    Hal states his symptoms started 1-2 days ago.   His symptoms consist of malaise, a headache, a sore throat, facial pain or pressure, and a cough. He is experiencing mild difficulty breathing with activities but can speak normally in full sentences. Hal also feels feverish but was unable to measure his temperature.   Symptom details     Cough: Hal coughs a few times an hour and his cough is not more bothersome at night. Phlegm does not come into his throat when he coughs. He believes his cough is caused by post-nasal drip.     Sore throat: Hal reports having mild throat pain (1-3 on a 10 point pain scale), does not have exudate on his tonsils, and can swallow liquids. The lymph nodes in his neck are not enlarged. A rash has not appeared on the skin since the sore throat started.     Facial pain or pressure: The facial pain or pressure feels worse when bending over or leaning forward.     Headache: He states the headache is mild (1-3 on a 10 point pain scale).      Hal denies having wheezing, nausea, teeth pain, ageusia, diarrhea, vomiting, rhinitis, ear pain, chills, enlarged lymph nodes, myalgias, anosmia, and nasal congestion. He also denies having recent facial or sinus surgery in the past 60 days and taking antibiotic medication in the past month.   Precipitating events  Hal is not sure if he has been exposed to someone with strep throat. He has not recently been exposed to someone " with influenza. Hal has not been in close contact with any high risk individuals.   Pertinent COVID-19 (Coronavirus) information  In the past 14 days, Hal has not worked in a congregate living setting.   He does not work or volunteer as healthcare worker or a  and does not work or volunteer in a healthcare facility.   Hal also has not lived in a congregate living setting in the past 14 days. He does not live with a healthcare worker.   Hal has had a close contact with a laboratory-confirmed COVID-19 patient within 14 days of symptom onset. Additional information about contact with COVID-19 (Coronavirus) patient as reported by the patient (free text): I met up with a friend who was diagnosed with COVID a couple weeks ago.  She was given the okay by the Dept. of Health to return to work and not be contagious.  This was yesterday, 7/12/2020, at 10:30AM.   Pertinent medical history  Hal had 3 sinus infections within the past year.   Hal does not need a return to work/school note.   Weight: 195 lbs   Hal does not smoke or use smokeless tobacco.   Weight: 195 lbs    MEDICATIONS: desvenlafaxine oral, ALLERGIES: NKDA  Clinician Response:  Dear Hal,   Your symptoms show that you may have coronavirus (COVID-19). This illness can cause fever, cough and trouble breathing. Many people get a mild case and get better on their own. Some people can get very sick.  What should I do?  We would like to test you for this virus.   1. Please call [phone] to schedule your visit. Explain that you were referred by [facility] to have a COVID-19 test. Be ready to share your [facility] visit ID number.  The following will serve as your written order for this COVID Test, ordered by me, for the indication of suspected COVID [Z20.828]: The test will be ordered in [employer], our electronic health record, after you are scheduled. It will show as ordered and authorized by Donny Patterson MD.  Order: COVID-19 (Coronavirus)  "PCR for SYMPTOMATIC testing from [facility].      2. When it's time for your COVID test:  Stay at least 6 feet away from others. (If someone will drive you to your test, stay in the backseat, as far away from the  as you can.)   Cover your mouth and nose with a mask, tissue or washcloth.  Go straight to the testing site. Don't make any stops on the way there or back.      3.Starting now: Stay home and away from others (self-isolate) until:   You've had no fever---and no medicine that reduces fever---for 3 full days (72 hours). And...   Your other symptoms have gotten better. For example, your cough or breathing has improved. And...   At least 10 days have passed since your symptoms started.       During this time, don't leave the house except for testing or medical care.   Stay in your own room, even for meals. Use your own bathroom if you can.   Stay away from others in your home. No hugging, kissing or shaking hands. No visitors.  Don't go to work, school or anywhere else.    Clean \"high touch\" surfaces often (doorknobs, counters, handles, etc.). Use a household cleaning spray or wipes. You'll find a full list of  on the EPA website: www.epa.gov/pesticide-registration/list-n-disinfectants-use-against-sars-cov-2.   Cover your mouth and nose with a mask, tissue or washcloth to avoid spreading germs.  Wash your hands and face often. Use soap and water.  Caregivers in these groups are at risk for severe illness due to COVID-19:  o People 65 years and older  o People who live in a nursing home or long-term care facility  o People with chronic disease (lung, heart, cancer, diabetes, kidney, liver, immunologic)  o People who have a weakened immune system, including those who:   Are in cancer treatment  Take medicine that weakens the immune system, such as corticosteroids  Had a bone marrow or organ transplant  Have an immune deficiency  Have poorly controlled HIV or AIDS  Are obese (body mass index of 40 or " higher)  Smoke regularly   o Caregivers should wear gloves while washing dishes, handling laundry and cleaning bedrooms and bathrooms.  o Use caution when washing and drying laundry: Don't shake dirty laundry, and use the warmest water setting that you can.  o For more tips, go to www.cdc.gov/coronavirus/2019-ncov/downloads/10Things.pdf.    4.Sign up for [employer]. We know it's scary to hear that you might have COVID-19. We want to track your symptoms to make sure you're okay over the next 2 weeks. Please look for an email from [employer]---this is a free, online program that we'll use to keep in touch. To sign up, follow the link in the email. Learn more at [email or web address]  How can I take care of myself?   Get lots of rest. Drink extra fluids (unless a doctor has told you not to).   Take Tylenol (acetaminophen) for fever or pain. If you have liver or kidney problems, ask your family doctor if it's okay to take Tylenol.   Adults can take either:    650 mg (two 325 mg pills) every 4 to 6 hours, or...   1,000 mg (two 500 mg pills) every 8 hours as needed.    Note: Don't take more than 3,000 mg in one day. Acetaminophen is found in many medicines (both prescribed and over-the-counter medicines). Read all labels to be sure you don't take too much.   For children, check the Tylenol bottle for the right dose. The dose is based on the child's age or weight.    If you have other health problems (like cancer, heart failure, an organ transplant or severe kidney disease): Call your specialty clinic if you don't feel better in the next 2 days.       Know when to call 911. Emergency warning signs include:    Trouble breathing or shortness of breath Pain or pressure in the chest that doesn't go away Feeling confused like you haven't felt before, or not being able to wake up Bluish-colored lips or face.  Where can I get more information?    [facility] -- About COVID-19: [email or web address]    CDC -- What to Do If You're Sick: www.cdc.gov/coronavirus/2019-ncov/about/steps-when-sick.html   CDC -- Ending Home Isolation: www.cdc.gov/coronavirus/2019-ncov/hcp/disposition-in-home-patients.html   CDC -- Caring for Someone: www.cdc.gov/coronavirus/2019-ncov/if-you-are-sick/care-for-someone.html   [facility] -- Interim Guidance for Hospital Discharge to Home: www.[facility].mn./diseases/coronavirus/hcp/hospdischarge.pdf   [facility] clinical trials (COVID-19 research studies): clinicalaffairs.[facility]-clinical-trials    Below are the COVID-19 hotlines at the [place] Department of Health ([facility]). Interpreters are available.    For health questions: Call 527-780-5456 or 1-405.755.3942 (7 a.m. to 7 p.m.) For questions about schools and childcare: Call 383-672-3472 or 1-939.834.2533 (7 a.m. to 7 p.m.)    Diagnosis: Cough  Diagnosis ICD: R05

## 2020-07-15 DIAGNOSIS — Z20.822 SUSPECTED COVID-19 VIRUS INFECTION: Primary | ICD-10-CM

## 2020-07-15 PROCEDURE — U0003 INFECTIOUS AGENT DETECTION BY NUCLEIC ACID (DNA OR RNA); SEVERE ACUTE RESPIRATORY SYNDROME CORONAVIRUS 2 (SARS-COV-2) (CORONAVIRUS DISEASE [COVID-19]), AMPLIFIED PROBE TECHNIQUE, MAKING USE OF HIGH THROUGHPUT TECHNOLOGIES AS DESCRIBED BY CMS-2020-01-R: HCPCS | Performed by: FAMILY MEDICINE

## 2020-07-15 NOTE — LETTER
July 20, 2020        Hal Woo  [address] [address]    This letter provides a written record that you were tested for COVID-19 on 7/15/20.       Your result was negative. This means that we didn t find the virus that causes COVID-19 in your sample. A test may show negative when you do actually have the virus. This can happen when the virus is in the early stages of infection, before you feel illness symptoms.    If you have symptoms   Stay home and away from others (self-isolate) until you meet ALL of the guidelines below:    You ve had no fever--and no medicine that reduces fever--for 3 full days (72 hours). And      Your other symptoms have gotten better. For example, your cough or breathing has improved. And     At least 10 days have passed since your symptoms started.    During this time:    Stay home. Don t go to work, school or anywhere else.     Stay in your own room, including for meals. Use your own bathroom if you can.    Stay away from others in your home. No hugging, kissing or shaking hands. No visitors.    Clean  high touch  surfaces often (doorknobs, counters, handles, etc.). Use a household cleaning spray or wipes. You can find a full list on the EPA website at www.epa.gov/pesticide-registration/list-n-disinfectants-use-against-sars-cov-2.    Cover your mouth and nose with a mask, tissue or washcloth to avoid spreading germs.    Wash your hands and face often with soap and water.    Going back to work  Check with your employer for any guidelines to follow for going back to work.    Employers: This document serves as formal notice that your employee tested negative for COVID-19, as of the testing date shown above.

## 2020-07-17 LAB
SARS-COV-2 RNA SPEC QL NAA+PROBE: NOT DETECTED
SPECIMEN SOURCE: NORMAL

## 2020-10-14 ENCOUNTER — OFFICE VISIT (OUTPATIENT)
Dept: FAMILY MEDICINE | Facility: CLINIC | Age: 30
End: 2020-10-14
Payer: COMMERCIAL

## 2020-10-14 VITALS
DIASTOLIC BLOOD PRESSURE: 78 MMHG | BODY MASS INDEX: 32.88 KG/M2 | OXYGEN SATURATION: 95 % | WEIGHT: 209.5 LBS | TEMPERATURE: 98.4 F | HEIGHT: 67 IN | SYSTOLIC BLOOD PRESSURE: 132 MMHG | RESPIRATION RATE: 14 BRPM | HEART RATE: 65 BPM

## 2020-10-14 DIAGNOSIS — G44.209 TENSION HEADACHE: ICD-10-CM

## 2020-10-14 DIAGNOSIS — F41.1 GAD (GENERALIZED ANXIETY DISORDER): ICD-10-CM

## 2020-10-14 DIAGNOSIS — J01.80 ACUTE NON-RECURRENT SINUSITIS OF OTHER SINUS: ICD-10-CM

## 2020-10-14 DIAGNOSIS — B34.9 VIRAL ILLNESS: Primary | ICD-10-CM

## 2020-10-14 RX ORDER — FLUTICASONE PROPIONATE 50 MCG
1 SPRAY, SUSPENSION (ML) NASAL DAILY
COMMUNITY

## 2020-10-14 RX ORDER — NAPROXEN 500 MG/1
500 TABLET ORAL 2 TIMES DAILY WITH MEALS
Qty: 20 TABLET | Refills: 0 | Status: SHIPPED | OUTPATIENT
Start: 2020-10-14 | End: 2021-04-22

## 2020-10-14 RX ORDER — DESVENLAFAXINE 100 MG/1
100 TABLET, EXTENDED RELEASE ORAL DAILY
Qty: 90 TABLET | Refills: 1 | Status: SHIPPED | OUTPATIENT
Start: 2020-10-14 | End: 2021-04-13

## 2020-10-14 RX ORDER — AMOXICILLIN 875 MG
875 TABLET ORAL 2 TIMES DAILY
Qty: 20 TABLET | Refills: 0 | Status: SHIPPED | OUTPATIENT
Start: 2020-10-14 | End: 2020-10-24

## 2020-10-14 ASSESSMENT — ANXIETY QUESTIONNAIRES
1. FEELING NERVOUS, ANXIOUS, OR ON EDGE: SEVERAL DAYS
IF YOU CHECKED OFF ANY PROBLEMS ON THIS QUESTIONNAIRE, HOW DIFFICULT HAVE THESE PROBLEMS MADE IT FOR YOU TO DO YOUR WORK, TAKE CARE OF THINGS AT HOME, OR GET ALONG WITH OTHER PEOPLE: SOMEWHAT DIFFICULT
6. BECOMING EASILY ANNOYED OR IRRITABLE: NEARLY EVERY DAY
GAD7 TOTAL SCORE: 10
5. BEING SO RESTLESS THAT IT IS HARD TO SIT STILL: SEVERAL DAYS
3. WORRYING TOO MUCH ABOUT DIFFERENT THINGS: SEVERAL DAYS
2. NOT BEING ABLE TO STOP OR CONTROL WORRYING: SEVERAL DAYS
7. FEELING AFRAID AS IF SOMETHING AWFUL MIGHT HAPPEN: SEVERAL DAYS

## 2020-10-14 ASSESSMENT — MIFFLIN-ST. JEOR: SCORE: 1856.53

## 2020-10-14 ASSESSMENT — PATIENT HEALTH QUESTIONNAIRE - PHQ9
5. POOR APPETITE OR OVEREATING: MORE THAN HALF THE DAYS
SUM OF ALL RESPONSES TO PHQ QUESTIONS 1-9: 11

## 2020-10-14 NOTE — PROGRESS NOTES
Subjective     Hal Woo is a 30 year old male who presents to clinic today for the following health issues:    CHIEF COMPLAINT: Subhash presents to the clinic with concerns for an infectious illness.  Symptoms include malaise, postnasal drainage, non-productive cough with tightness in chest and headache.  He has a history of perennial allergies with recurrent sinusitis and though he denies symptoms of congestion he thinks it may be a sinus infection.  He declined a video visit and responded negatively to all Covid questions.  Despite that he does describe current symptoms as feeling like a mild viral illness.  He denies any recent or known Covid exposures.  But he does continue to work at the [employer].  Primarily virtual but some in person exposures.  He denies fever, no runny nose, no diarrhea or rash, and has had no loss of taste or smell.  Headache is described as bilateral temporal tightness without severe symptoms treated with ibuprofen.  He does not typically have headaches.  Current symptoms have been present for the past 3 days.      Depression and anxiety.  Not has a longstanding history he has been followed at the [place] [facility].  Recently transferred care to primary care as he was doing well and stable.  He has had no recent change in medications.  He has been under a great deal of stress with work and with the pandemic in general.  His biggest concern is that he has had significant weight gain in the past 8 months.  This is causing him a great deal of distress especially with presenting to the clinic and actually being weighed.  He admits to overeating and a significant lack of activity with Covid.      PHQ 4/4/2019 4/17/2019 11/14/2019   PHQ-9 Total Score 8 6 8   Q9: Thoughts of better off dead/self-harm past 2 weeks Not at all Not at all Not at all   F/U: Thoughts of suicide or self-harm - - -   F/U: Safety concerns - - -     WILLIS-7 SCORE 2/11/2019 4/4/2019 11/14/2019   Total Score  "11 11 9       Review of Systems   Constitutional, HEENT, cardiovascular, pulmonary, gi and gu systems are negative, except as otherwise noted.      Objective    /78   Pulse 65   Temp 98.4  F (36.9  C)   Resp 14   Ht 1.69 m (5' 6.54\")   Wt 95 kg (209 lb 8 oz)   SpO2 95%   BMI 33.27 kg/m    Body mass index is 33.27 kg/m .  Physical Exam   GENERAL: healthy, alert and no distress  EYES: Eyes grossly normal to inspection, PERRL and conjunctivae and sclerae normal  HENT: ear canals and TM's normal, nose and mouth without ulcers or lesions  NECK: no adenopathy, no asymmetry, masses, or scars and thyroid normal to palpation  RESP: lungs clear to auscultation - no rales, rhonchi or wheezes  CV: regular rate and rhythm, normal S1 S2, no S3 or S4, no murmur, click or rub, no peripheral edema and peripheral pulses strong  SKIN: no suspicious lesions or rashes  PSYCH: well dressed and groomed.  Good eye contact and is cooperative. Thoughts linear.  No delusions, compulsions or paranoia.  Affect flat.  Patient denies homicidal and suicidal ideation as well as no thoughts or actions of self-harm.          Assessment & Plan     Viral illness  Though Subhash is stable from a physical standpoint current symptoms could represent a mild case of Covid.  Testing recommended in order was placed.  He could have another mild viral illness, exacerbation of known allergies.  Symptomatic treatment reviewed and discussed including continued use of Flonase, saline nasal spray ibuprofen as needed.  Discussed the importance of strict isolation and quarantine with any suspected illness for at least 10 days following the onset of symptoms.  He should consider doing this regardless of the Covid test results.  - Symptomatic COVID-19 Virus (Coronavirus) by PCR    Tension headache  Though I cannot be certain suspect that there may be a stress/tension component to his current headaches.  Though they could be related to above.  They do not " "appear to be migrainous in character.  - naproxen (NAPROSYN) 500 MG tablet  Dispense: 20 tablet; Refill: 0    WILLIS (generalized anxiety disorder)  Continue current medications and follow-up with counselor.  - desvenlafaxine (PRISTIQ) 100 MG 24 hr tablet  Dispense: 90 tablet; Refill: 1    Due to his history of recurrent sinusitis I did give him a prescription for amoxicillin though I recommended he hold off on taking this for an additional several days.  Discussed that oftentimes antibiotics are not needed for the treatment of sinus infections.    Weight gain: There has been significant weight gain in the past 8 months that the patient is clearly anxious and upset about.  1 his current illness has improved and been addressed recommended exercise and activity.  Weight management program and dietitian services were recommended through [facility] as well.  He will contact me if he wants to proceed with any of these modalities.  BMI:   Estimated body mass index is 33.27 kg/m  as calculated from the following:    Height as of this encounter: 1.69 m (5' 6.54\").    Weight as of this encounter: 95 kg (209 lb 8 oz).   Weight management plan: Discussed healthy diet and exercise guidelines         Follow up if you have any worsening or persistent problems or concerns.      Leona Leon PA-C  [facility]    "

## 2020-10-14 NOTE — NURSING NOTE
"30 year old  Chief Complaint   Patient presents with     Sinus Problem     headache, just not feeling well for almost a week.Had some stomach problems but those problems has gone fat.        Blood pressure 132/78, pulse 65, temperature 98.4  F (36.9  C), resp. rate 14, height 1.69 m (5' 6.54\"), weight 95 kg (209 lb 8 oz), SpO2 95 %. Body mass index is 33.27 kg/m .  Patient Active Problem List   Diagnosis     Disturbance in sleep behavior     Generalized anxiety disorder     Insomnia     Panic disorder with agoraphobia     Vitamin D deficiency     Hx of psychiatric care     Moderate episode of recurrent major depressive disorder (H)     Vegan diet     Intrinsic eczema     Hematospermia       Wt Readings from Last 2 Encounters:   10/14/20 95 kg (209 lb 8 oz)   12/11/19 77.6 kg (171 lb)     BP Readings from Last 3 Encounters:   10/14/20 132/78   12/11/19 123/79   11/26/19 136/83         Current Outpatient Medications   Medication     cetirizine (ZYRTEC) 10 MG tablet     desvenlafaxine (PRISTIQ) 100 MG 24 hr tablet     fluocinonide (LIDEX) 0.05 % ointment     fluticasone (FLONASE) 50 MCG/ACT nasal spray     triamcinolone (KENALOG) 0.1 % ointment     amoxicillin (AMOXIL) 875 MG tablet     azithromycin (ZITHROMAX) 250 MG tablet     No current facility-administered medications for this visit.        Social History     Tobacco Use     Smoking status: Never Smoker     Smokeless tobacco: Never Used   Substance Use Topics     Alcohol use: Yes     Alcohol/week: 2.0 standard drinks     Types: 2 Standard drinks or equivalent per week     Frequency: 2-4 times a month     Drinks per session: 1 or 2     Binge frequency: Never     Drug use: No       Health Maintenance Due   Topic Date Due     HIV SCREENING  04/03/2004     PREVENTIVE CARE VISIT  02/06/2019     PHQ-9  05/12/2020     INFLUENZA VACCINE (1) 09/01/2020       No results found for: PAP      October 14, 2020 2:50 PM  "

## 2020-10-15 ASSESSMENT — ANXIETY QUESTIONNAIRES: GAD7 TOTAL SCORE: 10

## 2020-10-18 DIAGNOSIS — B34.9 VIRAL ILLNESS: ICD-10-CM

## 2020-10-18 PROCEDURE — U0003 INFECTIOUS AGENT DETECTION BY NUCLEIC ACID (DNA OR RNA); SEVERE ACUTE RESPIRATORY SYNDROME CORONAVIRUS 2 (SARS-COV-2) (CORONAVIRUS DISEASE [COVID-19]), AMPLIFIED PROBE TECHNIQUE, MAKING USE OF HIGH THROUGHPUT TECHNOLOGIES AS DESCRIBED BY CMS-2020-01-R: HCPCS | Performed by: PHYSICIAN ASSISTANT

## 2020-10-19 LAB
SARS-COV-2 RNA SPEC QL NAA+PROBE: NOT DETECTED
SPECIMEN SOURCE: NORMAL

## 2020-12-27 ENCOUNTER — HEALTH MAINTENANCE LETTER (OUTPATIENT)
Age: 30
End: 2020-12-27

## 2021-04-09 DIAGNOSIS — F41.1 GAD (GENERALIZED ANXIETY DISORDER): ICD-10-CM

## 2021-04-09 NOTE — TELEPHONE ENCOUNTER
Last time prescribed: 10/14/20 , 90 tabs/caps x 1 refills  Last office visit: 10/14/20  Next appointment: No Future Appointment Schedule  Last CREATININE LAB: 2/11/19    PHQ-9 SCORE 10/14/2020   PHQ-9 Total Score 11     WILLIS-7 SCORE 10/14/2020   Total Score 10     Medication is being filled for 1 time refill only due to:  Patient needs to be seen because DUE FOR MENTAL HEALTH ASSESSMENT AND LABS FOR MEDICATION MONITORING.     Lisa Johns RN  04/13/21  9:58 AM

## 2021-04-13 RX ORDER — DESVENLAFAXINE 100 MG/1
100 TABLET, EXTENDED RELEASE ORAL DAILY
Qty: 30 TABLET | Refills: 0 | Status: SHIPPED | OUTPATIENT
Start: 2021-04-13 | End: 2021-04-22

## 2021-04-22 ENCOUNTER — OFFICE VISIT (OUTPATIENT)
Dept: FAMILY MEDICINE | Facility: CLINIC | Age: 31
End: 2021-04-22
Payer: COMMERCIAL

## 2021-04-22 ENCOUNTER — MYC MEDICAL ADVICE (OUTPATIENT)
Dept: FAMILY MEDICINE | Facility: CLINIC | Age: 31
End: 2021-04-22

## 2021-04-22 VITALS
HEART RATE: 78 BPM | TEMPERATURE: 97 F | DIASTOLIC BLOOD PRESSURE: 84 MMHG | WEIGHT: 191.75 LBS | SYSTOLIC BLOOD PRESSURE: 124 MMHG | RESPIRATION RATE: 13 BRPM | OXYGEN SATURATION: 100 % | BODY MASS INDEX: 30.45 KG/M2

## 2021-04-22 DIAGNOSIS — Z13.220 LIPID SCREENING: ICD-10-CM

## 2021-04-22 DIAGNOSIS — F33.1 MODERATE EPISODE OF RECURRENT MAJOR DEPRESSIVE DISORDER (H): Primary | ICD-10-CM

## 2021-04-22 DIAGNOSIS — F41.1 GAD (GENERALIZED ANXIETY DISORDER): ICD-10-CM

## 2021-04-22 DIAGNOSIS — Z51.81 ENCOUNTER FOR THERAPEUTIC DRUG MONITORING: ICD-10-CM

## 2021-04-22 RX ORDER — DESVENLAFAXINE 100 MG/1
100 TABLET, EXTENDED RELEASE ORAL DAILY
Qty: 90 TABLET | Refills: 1 | Status: SHIPPED | OUTPATIENT
Start: 2021-04-22 | End: 2021-07-12

## 2021-04-22 ASSESSMENT — ANXIETY QUESTIONNAIRES
1. FEELING NERVOUS, ANXIOUS, OR ON EDGE: MORE THAN HALF THE DAYS
IF YOU CHECKED OFF ANY PROBLEMS ON THIS QUESTIONNAIRE, HOW DIFFICULT HAVE THESE PROBLEMS MADE IT FOR YOU TO DO YOUR WORK, TAKE CARE OF THINGS AT HOME, OR GET ALONG WITH OTHER PEOPLE: SOMEWHAT DIFFICULT
2. NOT BEING ABLE TO STOP OR CONTROL WORRYING: MORE THAN HALF THE DAYS
5. BEING SO RESTLESS THAT IT IS HARD TO SIT STILL: SEVERAL DAYS
6. BECOMING EASILY ANNOYED OR IRRITABLE: NEARLY EVERY DAY
3. WORRYING TOO MUCH ABOUT DIFFERENT THINGS: MORE THAN HALF THE DAYS
GAD7 TOTAL SCORE: 13
7. FEELING AFRAID AS IF SOMETHING AWFUL MIGHT HAPPEN: SEVERAL DAYS

## 2021-04-22 ASSESSMENT — PATIENT HEALTH QUESTIONNAIRE - PHQ9
5. POOR APPETITE OR OVEREATING: MORE THAN HALF THE DAYS
SUM OF ALL RESPONSES TO PHQ QUESTIONS 1-9: 8

## 2021-04-22 NOTE — PROGRESS NOTES
"  Assessment & Plan     (F33.1) Moderate episode of recurrent major depressive disorder (H)  (primary encounter diagnosis)  Plan: desvenlafaxine (PRISTIQ) 100 MG 24 hr tablet    (F41.1) WILLIS (generalized anxiety disorder)  Plan: desvenlafaxine (PRISTIQ) 100 MG 24 hr tablet       Continue current dose of medication.  Continue with counseling--though good things are happening it is still very stressful.  Eat a healthy diet and get as much regular exercise as possible.  Plan is to continue care through [facility] as he will keep his full time job here while he is going to school.  Return for lipid and creatinine test.  Future orders placed.    Immunization review:  Can consider MemB vaccine.  [facility] recommends all college students consider regardless of age. OW vaccines look up to date.  Should wait 2 weeks after second COVID vaccine before getting any other vaccines.          30 minutes spent on the date of the encounter doing chart review, review of outside records, patient visit and documentation        BMI:   Estimated body mass index is 30.45 kg/m  as calculated from the following:    Height as of 10/14/20: 1.69 m (5' 6.54\").    Weight as of this encounter: 87 kg (191 lb 12 oz).   Weight management plan: Discussed healthy diet and exercise guidelines      No follow-ups on file.    Leona Leon PA-C  [facility]    Kayla Cullen is a 31 year old who presents for the following health issues     HPI     Depression and Anxiety Follow-Up:  Doing OK but is stressed and anxious about recent acceptance to grad school in [employer]. He is excited but anxious.  Meeting with therapist regularly.      How are you doing with your depression since your last visit? Worsened but feels he has a good reason    How are you doing with your anxiety since your last visit?  Worsened     Are you having other symptoms that might be associated with depression or anxiety? Yes:  insomnia and irritability.  Emotions have " been more labile    Have you had a significant life event? OTHER: Accepted to grad school.     Do you have any concerns with your use of alcohol or other drugs? No    Social History     Tobacco Use     Smoking status: Never Smoker     Smokeless tobacco: Never Used   Substance Use Topics     Alcohol use: Yes     Alcohol/week: 2.0 standard drinks     Types: 2 Standard drinks or equivalent per week     Frequency: 2-4 times a month     Drinks per session: 1 or 2     Binge frequency: Never     Drug use: No     PHQ 11/14/2019 10/14/2020 4/22/2021   PHQ-9 Total Score 8 11 8   Q9: Thoughts of better off dead/self-harm past 2 weeks Not at all Not at all Not at all   F/U: Thoughts of suicide or self-harm - - -   F/U: Safety concerns - - -     WILLIS-7 SCORE 11/14/2019 10/14/2020 4/22/2021   Total Score 9 10 13         Review of Systems   Constitutional, HEENT, cardiovascular, pulmonary, gi and gu systems are negative, except as otherwise noted.      Objective    /84 (BP Location: Left arm, Patient Position: Sitting, Cuff Size: Adult Large)   Pulse 78   Temp 97  F (36.1  C) (Skin)   Resp 13   Wt 87 kg (191 lb 12 oz)   SpO2 100%   BMI 30.45 kg/m    Body mass index is 30.45 kg/m .  Physical Exam   GENERAL: healthy, alert and no distress  RESP: lungs clear to auscultation - no rales, rhonchi or wheezes  CV: regular rate and rhythm, normal S1 S2, no S3 or S4, no murmur, click or rub, no peripheral edema and peripheral pulses strong  MS: no gross musculoskeletal defects noted, no edema  SKIN: no suspicious lesions or rashes  PSYCH: well dressed and groomed.  Good eye contact and is cooperative. Thoughts linear.  No delusions, compulsions or paranoia.  Affect bright, somewhat anxious.  Patient denies homicidal and suicidal ideation as well as no thoughts or actions of self-harm.

## 2021-04-22 NOTE — NURSING NOTE
31 year old  Chief Complaint   Patient presents with     Anxiety       Blood pressure 124/84, pulse 78, temperature 97  F (36.1  C), temperature source Skin, resp. rate 13, weight 87 kg (191 lb 12 oz), SpO2 100 %. Body mass index is 30.45 kg/m .  Patient Active Problem List   Diagnosis     Disturbance in sleep behavior     Generalized anxiety disorder     Insomnia     Panic disorder with agoraphobia     Vitamin D deficiency     Hx of psychiatric care     Moderate episode of recurrent major depressive disorder (H)     Vegan diet     Intrinsic eczema     Hematospermia       Wt Readings from Last 2 Encounters:   04/22/21 87 kg (191 lb 12 oz)   10/14/20 95 kg (209 lb 8 oz)     BP Readings from Last 3 Encounters:   04/22/21 124/84   10/14/20 132/78   12/11/19 123/79         Current Outpatient Medications   Medication     desvenlafaxine (PRISTIQ) 100 MG 24 hr tablet     fluticasone (FLONASE) 50 MCG/ACT nasal spray     cetirizine (ZYRTEC) 10 MG tablet     fluocinonide (LIDEX) 0.05 % ointment     naproxen (NAPROSYN) 500 MG tablet     triamcinolone (KENALOG) 0.1 % ointment     No current facility-administered medications for this visit.        Social History     Tobacco Use     Smoking status: Never Smoker     Smokeless tobacco: Never Used   Substance Use Topics     Alcohol use: Yes     Alcohol/week: 2.0 standard drinks     Types: 2 Standard drinks or equivalent per week     Frequency: 2-4 times a month     Drinks per session: 1 or 2     Binge frequency: Never     Drug use: No       Health Maintenance Due   Topic Date Due     ADVANCE CARE PLANNING  Never done     HIV SCREENING  Never done     HEPATITIS C SCREENING  Never done     PREVENTIVE CARE VISIT  02/06/2019     INFLUENZA VACCINE (1) 09/01/2020     PHQ-9  04/14/2021       No results found for: PAP      April 22, 2021 2:32 PM

## 2021-04-23 ASSESSMENT — ANXIETY QUESTIONNAIRES: GAD7 TOTAL SCORE: 13

## 2021-04-24 ENCOUNTER — HEALTH MAINTENANCE LETTER (OUTPATIENT)
Age: 31
End: 2021-04-24

## 2021-05-03 ENCOUNTER — TELEPHONE (OUTPATIENT)
Dept: FAMILY MEDICINE | Facility: CLINIC | Age: 31
End: 2021-05-03

## 2021-05-03 NOTE — TELEPHONE ENCOUNTER
Form or Letter Request    Type or form/letter needing completion: Immuzations, needs signed by provider   Provider: Leona Leon PA-C, but asked to give this to Alicia Lindquist  Has provider seen patient for office visit related to reason for form request? Yes  Date form needed: Asap   Once completed: Patient will  at .     Note  alicia currently has this handed to by ALICE Ordonez.

## 2021-05-04 NOTE — TELEPHONE ENCOUNTER
Form complete. Updated pt. He will come and retrieve. It is waiting at the  for him.    JYOTSNA BoyceSW

## 2021-07-10 ENCOUNTER — MYC MEDICAL ADVICE (OUTPATIENT)
Dept: FAMILY MEDICINE | Facility: CLINIC | Age: 31
End: 2021-07-10

## 2021-07-10 DIAGNOSIS — F41.1 GAD (GENERALIZED ANXIETY DISORDER): ICD-10-CM

## 2021-07-10 DIAGNOSIS — F33.1 MODERATE EPISODE OF RECURRENT MAJOR DEPRESSIVE DISORDER (H): ICD-10-CM

## 2021-07-12 RX ORDER — DESVENLAFAXINE 100 MG/1
100 TABLET, EXTENDED RELEASE ORAL DAILY
Qty: 30 TABLET | Refills: 0 | Status: SHIPPED | OUTPATIENT
Start: 2021-07-12

## 2021-07-12 NOTE — TELEPHONE ENCOUNTER
Note  Last Office Visit: 4/22/21  [facility] Appointments: [place]  Medication last refilled: 4/22/21 #90 1 refill    Last PHQ-9 10/14/2020 4/22/2021   1.  Little interest or pleasure in doing things 1 1   2.  Feeling down, depressed, or hopeless 2 1   3.  Trouble falling or staying asleep, or sleeping too much 2 1   4.  Feeling tired or having little energy 2 2   5.  Poor appetite or overeating 2 1   6.  Feeling bad about yourself 1 0   7.  Trouble concentrating 1 1   8.  Moving slowly or restless 0 1   Q9: Thoughts of better off dead/self-harm past 2 weeks 0 0   PHQ-9 Total Score 11 8       WILLIS-7 SCORE 10/14/2020 4/22/2021   Total Score 10 13     Prescription approved per [facility] Refill Protocol.    Tata Head, RN, BSN

## 2021-10-09 ENCOUNTER — HEALTH MAINTENANCE LETTER (OUTPATIENT)
Age: 31
End: 2021-10-09

## 2021-12-06 DIAGNOSIS — F41.1 GAD (GENERALIZED ANXIETY DISORDER): ICD-10-CM

## 2021-12-06 DIAGNOSIS — F33.1 MODERATE EPISODE OF RECURRENT MAJOR DEPRESSIVE DISORDER (H): ICD-10-CM

## 2021-12-06 RX ORDER — DESVENLAFAXINE 100 MG/1
100 TABLET, EXTENDED RELEASE ORAL DAILY
Qty: 30 TABLET | Refills: 0 | OUTPATIENT
Start: 2021-12-06

## 2021-12-06 NOTE — TELEPHONE ENCOUNTER
Desvenlafaxine (Pristiq) 100 mg    Last Office Visit: 4/22/21    Prescription denied.  Patient now living in [place] and no longer under the care of Leona Leon PA-C.    Tata Head, RN, BSN

## 2022-02-17 PROBLEM — R36.1 HEMATOSPERMIA: Status: RESOLVED | Noted: 2019-02-11 | Resolved: 2021-04-22

## 2022-05-16 ENCOUNTER — HEALTH MAINTENANCE LETTER (OUTPATIENT)
Age: 32
End: 2022-05-16

## 2022-09-11 ENCOUNTER — HEALTH MAINTENANCE LETTER (OUTPATIENT)
Age: 32
End: 2022-09-11

## 2023-06-03 ENCOUNTER — HEALTH MAINTENANCE LETTER (OUTPATIENT)
Age: 33
End: 2023-06-03

## 2024-05-24 NOTE — PATIENT INSTRUCTIONS
- Increase Zoloft to 25 mg daily    Thank you for coming to the PSYCHIATRY CL[facility]IC.    Lab Testing:  **If you had lab testing today and your results are reassuring or normal they will be mailed to you or sent through [employer] within 7 days.   **If the lab tests need quick action we will call you with the results.  The phone number we will call with results is # [phone] (home) . If this is not the best number please call our clinic and change the number.    Medication Refills:  If you need any refills please call your pharmacy and they will contact us. Please allow three business for refill processing.   If you need to  your refill at a new pharmacy, please contact the new pharmacy directly. The new pharmacy will help you get your medications transferred.     Scheduling:  If you have any concerns about today's visit or wish to schedule another appointment please call our office during normal business hours [phone] (8-5:00 M-F)    Contact Us:  Please call [phone] during business hours (8-5:00 M-F).  If after clinic hours, or on the weekend, please call  [phone].      MENTAL HEALTH CRISIS NUMBERS:  [facility]:   [facility] - [phone]   Crisis Residence [facility] - [phone]   Walk-In Counseling Center [facility] - [phone]   COPE 24/7 [place] Mobile Team for Adults - [[phone]]; Child - [[phone]]     Crisis Connection - [phone]     [facility]:   [facility] - [phone]   Walk-in counseling [facility] - [phone]   Walk-in counseling [facility] - [phone]   Crisis Residence [facility] - [phone]   Urgent Care Adult Mental Health:   --Drop-in, 24/7 crisis line, and [facility] Mobile Team [[phone]]    CRISIS TEXT LINE: Text [phone] from anywhere, anytime, any crisis 24/7;    OR SEE www.crisistextline.org     Poison Control Center -  [phone]    CHILD: [facility] needs assessment team - [phone]     [facility] - [phone]; or [facility] - [phone]    If you have a medical emergency please call 911or go to the nearest ER.                    _____________________________________________    Again thank you for choosing [facility] and please let us know how we can best partner with you to improve you and your family's health.  You may be receiving a survey in the mail regarding this appointment. We would love to have your feedback, both positive and negative, so please fill out the survey and return it using the provided envolpe. The survey is done by an external company, so your answers are anonymous.      - Chronic, stable   - Continue home Carvedilol 12.5mg BID  - Continue Eliquis   - Cardio consulted  dr davies